# Patient Record
Sex: FEMALE | Race: BLACK OR AFRICAN AMERICAN | NOT HISPANIC OR LATINO | Employment: UNEMPLOYED | URBAN - METROPOLITAN AREA
[De-identification: names, ages, dates, MRNs, and addresses within clinical notes are randomized per-mention and may not be internally consistent; named-entity substitution may affect disease eponyms.]

---

## 2017-03-09 ENCOUNTER — LAB VISIT (OUTPATIENT)
Dept: LAB | Facility: HOSPITAL | Age: 47
End: 2017-03-09
Attending: FAMILY MEDICINE
Payer: COMMERCIAL

## 2017-03-09 ENCOUNTER — OFFICE VISIT (OUTPATIENT)
Dept: FAMILY MEDICINE | Facility: CLINIC | Age: 47
End: 2017-03-09
Payer: COMMERCIAL

## 2017-03-09 VITALS
OXYGEN SATURATION: 98 % | BODY MASS INDEX: 27.1 KG/M2 | TEMPERATURE: 98 F | RESPIRATION RATE: 18 BRPM | SYSTOLIC BLOOD PRESSURE: 114 MMHG | HEIGHT: 65 IN | HEART RATE: 84 BPM | WEIGHT: 162.69 LBS | DIASTOLIC BLOOD PRESSURE: 72 MMHG

## 2017-03-09 DIAGNOSIS — H93.19 TINNITUS, UNSPECIFIED LATERALITY: ICD-10-CM

## 2017-03-09 DIAGNOSIS — R20.2 TINGLING: ICD-10-CM

## 2017-03-09 DIAGNOSIS — D64.9 BORDERLINE ANEMIA: ICD-10-CM

## 2017-03-09 DIAGNOSIS — E55.9 MILD VITAMIN D DEFICIENCY: ICD-10-CM

## 2017-03-09 DIAGNOSIS — F41.0 PANIC ATTACKS: ICD-10-CM

## 2017-03-09 DIAGNOSIS — J30.2 SEASONAL ALLERGIC RHINITIS, UNSPECIFIED ALLERGIC RHINITIS TRIGGER: ICD-10-CM

## 2017-03-09 LAB
25(OH)D3+25(OH)D2 SERPL-MCNC: 24 NG/ML
ALBUMIN SERPL BCP-MCNC: 3.4 G/DL
ALP SERPL-CCNC: 53 U/L
ALT SERPL W/O P-5'-P-CCNC: 7 U/L
ANION GAP SERPL CALC-SCNC: 10 MMOL/L
AST SERPL-CCNC: 17 U/L
BASOPHILS # BLD AUTO: 0.02 K/UL
BASOPHILS NFR BLD: 0.4 %
BILIRUB SERPL-MCNC: 0.5 MG/DL
BUN SERPL-MCNC: 6 MG/DL
CALCIUM SERPL-MCNC: 9.2 MG/DL
CHLORIDE SERPL-SCNC: 102 MMOL/L
CHOLEST/HDLC SERPL: 3.9 {RATIO}
CO2 SERPL-SCNC: 28 MMOL/L
CREAT SERPL-MCNC: 0.8 MG/DL
DIFFERENTIAL METHOD: ABNORMAL
EOSINOPHIL # BLD AUTO: 0.1 K/UL
EOSINOPHIL NFR BLD: 1.9 %
ERYTHROCYTE [DISTWIDTH] IN BLOOD BY AUTOMATED COUNT: 12.3 %
EST. GFR  (AFRICAN AMERICAN): >60 ML/MIN/1.73 M^2
EST. GFR  (NON AFRICAN AMERICAN): >60 ML/MIN/1.73 M^2
FERRITIN SERPL-MCNC: 39 NG/ML
GLUCOSE SERPL-MCNC: 74 MG/DL
HCT VFR BLD AUTO: 36.6 %
HDL/CHOLESTEROL RATIO: 25.4 %
HDLC SERPL-MCNC: 201 MG/DL
HDLC SERPL-MCNC: 51 MG/DL
HGB BLD-MCNC: 12.6 G/DL
LDLC SERPL CALC-MCNC: 137.8 MG/DL
LYMPHOCYTES # BLD AUTO: 1.4 K/UL
LYMPHOCYTES NFR BLD: 31 %
MCH RBC QN AUTO: 31.8 PG
MCHC RBC AUTO-ENTMCNC: 34.4 %
MCV RBC AUTO: 92 FL
MONOCYTES # BLD AUTO: 0.3 K/UL
MONOCYTES NFR BLD: 5.8 %
NEUTROPHILS # BLD AUTO: 2.8 K/UL
NEUTROPHILS NFR BLD: 60.7 %
NONHDLC SERPL-MCNC: 150 MG/DL
PLATELET # BLD AUTO: 243 K/UL
PMV BLD AUTO: 10.2 FL
POTASSIUM SERPL-SCNC: 4.5 MMOL/L
PROT SERPL-MCNC: 7.7 G/DL
RBC # BLD AUTO: 3.96 M/UL
SODIUM SERPL-SCNC: 140 MMOL/L
TRIGL SERPL-MCNC: 61 MG/DL
TSH SERPL DL<=0.005 MIU/L-ACNC: 0.73 UIU/ML
WBC # BLD AUTO: 4.65 K/UL

## 2017-03-09 PROCEDURE — 82306 VITAMIN D 25 HYDROXY: CPT

## 2017-03-09 PROCEDURE — 80053 COMPREHEN METABOLIC PANEL: CPT

## 2017-03-09 PROCEDURE — 1160F RVW MEDS BY RX/DR IN RCRD: CPT | Mod: S$GLB,,, | Performed by: FAMILY MEDICINE

## 2017-03-09 PROCEDURE — 99214 OFFICE O/P EST MOD 30 MIN: CPT | Mod: S$GLB,,, | Performed by: FAMILY MEDICINE

## 2017-03-09 PROCEDURE — 99999 PR PBB SHADOW E&M-EST. PATIENT-LVL III: CPT | Mod: PBBFAC,,, | Performed by: FAMILY MEDICINE

## 2017-03-09 PROCEDURE — 85025 COMPLETE CBC W/AUTO DIFF WBC: CPT

## 2017-03-09 PROCEDURE — 82728 ASSAY OF FERRITIN: CPT

## 2017-03-09 PROCEDURE — 80061 LIPID PANEL: CPT

## 2017-03-09 PROCEDURE — 84443 ASSAY THYROID STIM HORMONE: CPT

## 2017-03-09 PROCEDURE — 36415 COLL VENOUS BLD VENIPUNCTURE: CPT | Mod: PO

## 2017-03-09 RX ORDER — ALPRAZOLAM 1 MG/1
TABLET ORAL
Qty: 30 TABLET | Refills: 1 | Status: SHIPPED | OUTPATIENT
Start: 2017-03-09 | End: 2017-10-05 | Stop reason: SDUPTHER

## 2017-03-09 RX ORDER — FLUTICASONE PROPIONATE 50 MCG
2 SPRAY, SUSPENSION (ML) NASAL DAILY PRN
Qty: 16 G | Refills: 5 | Status: SHIPPED | OUTPATIENT
Start: 2017-03-09 | End: 2017-04-25

## 2017-03-09 NOTE — PROGRESS NOTES
Subjective:       Patient ID: Joycelyn Wesley is a 46 y.o. female.    Chief Complaint: Chest Pain; Tingling; and Tinnitus    HPI Comments: Ms. Wesley comes in today with the following complaints.    She reports having intermittent unusual chest sensation for 2 weeks.  She does not describe chest sensation as pain and denies having tightness, shortness of breath, cough, wheezing.  She states she is scheduled to see Dr. William Wagoner, cardiologist, on March 24, 2017 for further evaluation.  She denies known family history of heart disease.  She denies use of tobacco, alcohol or drugs.    She also reports intermittently having tingling sensation without pain in her legs and arms which recur 2-3 weeks ago.  She reports having similar symptoms in the past (around November 2015).  She denies associated trauma.  She states she might actually be experiencing anxiety with tingling sensation and more so at night.  She states sometimes she takes Xanax with help. She was evaluated by Dr. Terrell, neurologist, on January 22, 2016 for tingling sensation and advised symptoms might be anxiety-related with worst possibility - MS or spinal cord lesion; however, he advised her to make a note of whether her symptoms occur with anxiety at night, and if occurs in normal situation, follow-up with him advised at which time he would order MRI of the brain and cervical spine.    She reports having occasional high-pitched sound and hears wind and both of her ears for at least one month.  She denies having associated sinus symptoms, ear pain, fever, chills, headache.    She reports having chronic cold intolerance.    She is fasting today.      Current Outpatient Prescriptions:  alprazolam (XANAX) 1 MG tablet, TAKE 1 TABLET BY MOUTH DAILY AS NEEDED FOR INSOMNIA OR ANXIETY  vitamin D (VITAMIN D3) 1000 units Tab, Take 185 mg by mouth once daily.        Chest Pain    Pertinent negatives include no abdominal pain, back pain, cough, fever,  nausea, palpitations, shortness of breath or vomiting.   Ringing in Ears:    Associated symptoms: tinnitus.  No ear pain, no fever and no rhinorrhea.    Review of Systems   Constitutional: Negative for chills, fatigue and fever.   HENT: Positive for tinnitus. Negative for congestion, ear pain, rhinorrhea, sinus pressure, sneezing and sore throat.         See history of present illness.   Respiratory: Negative for cough, shortness of breath and wheezing.    Cardiovascular: Negative for chest pain, palpitations and leg swelling.        See history of present illness.   Gastrointestinal: Negative for abdominal pain, constipation, diarrhea, nausea and vomiting.   Endocrine: Positive for cold intolerance. Negative for heat intolerance.   Genitourinary: Negative for difficulty urinating.   Musculoskeletal: Negative for back pain.   Neurological:        Positive for tingling sensation. See history of present illness.   Psychiatric/Behavioral: Negative for dysphoric mood. The patient is nervous/anxious.         See history of present illness.       Objective:      Physical Exam   Constitutional: She is oriented to person, place, and time. She appears well-developed and well-nourished. No distress.   Pleasant.   HENT:   Head: Normocephalic and atraumatic.   Right Ear: External ear normal.   Left Ear: External ear normal.   Mouth/Throat: No oropharyngeal exudate.   Non tender sinuses.  Left TM slightly congested without perforation.  Right TM shiny and clear.  Nasal mucosa pink, slightly congested (left > right) without drainage noted.   Eyes: Conjunctivae and EOM are normal. Pupils are equal, round, and reactive to light. Right eye exhibits no discharge.   Neck: Normal range of motion. Neck supple. No thyromegaly present.   Cardiovascular: Normal rate, regular rhythm and intact distal pulses.    No murmur heard.  Pulmonary/Chest: Effort normal and breath sounds normal. No respiratory distress. She has no wheezes.    Abdominal: Soft. Bowel sounds are normal. She exhibits no distension and no mass. There is no tenderness. There is no rebound and no guarding.   Musculoskeletal: Normal range of motion. She exhibits no edema or tenderness.   She is ambulatory without problems.    Lymphadenopathy:     She has no cervical adenopathy.   Neurological: She is alert and oriented to person, place, and time. She has normal reflexes.   Skin: She is not diaphoretic.   Psychiatric: She has a normal mood and affect. Her behavior is normal. Judgment and thought content normal.   Vitals reviewed.      Assessment:       1. Tingling    2. Tinnitus, unspecified laterality    3. Mild vitamin D deficiency    4. Borderline anemia    5. Seasonal allergic rhinitis, unspecified allergic rhinitis trigger    6. Panic attacks        Plan:       1.  Labs:  TSH, CBC, CMP, Lipid panel, vitamin D, Ferritin.  Patient advised to correspond via My Chart for results.  2.  Continue current medications, follow low sodium, low cholesterol, low carb diet, daily walks.  3.  Audiogram.  4.  Advised patient to keep diary of tingling sensation, and if anxiety not occurs with tingling sensation, patient needs to return to neurology for further evaluation.  5.  Keep follow up with specialists.  6.  Prescription refill - Alprazolam 1 mg daily prn anxiety, insomnia, #30, 1 refills.  7.  Flonase 2 sprays each nostril daily prn, #1, 5 refills.  8.  Follow up sooner if no improvement or worsening symptoms noted.

## 2017-03-09 NOTE — MR AVS SNAPSHOT
[facility]  [address] [address]  Phone: [phone]                  Joycelyn Wesley   3/9/2017 10:30 AM   Office Visit    Description:  Female : 1970   Provider:  Jenn Womack MD   Department:  [facility]           Reason for Visit     Chest Pain     Tingling     Tinnitus           Diagnoses this Visit        Comments    Tingling         Tinnitus, unspecified laterality         Mild vitamin D deficiency         Borderline anemia         Seasonal allergic rhinitis, unspecified allergic rhinitis trigger         Panic attacks                To Do List           Future Appointments        Provider Department Dept Phone    3/9/2017 11:40 AM LABORATORY, [address] [facility] [phone]    3/14/2017 9:30 AM Marisa Castillo [facility] - Audiology [phone]    2017 9:00 AM Jenn Womack MD [facility] [phone]      Goals (5 Years of Data)     None      Follow-Up and Disposition     Return in about 7 weeks (around 2017) for physical.       These Medications        Disp Refills Start End    alprazolam (XANAX) 1 MG tablet 30 tablet 1 3/9/2017     TAKE 1 TABLET BY MOUTH DAILY AS NEEDED FOR INSOMNIA OR ANXIETY    Pharmacy: [facility] [id number] [place] [id number] [facility] [place] [address] [facility] Ph #: [phone]       fluticasone (FLONASE) 50 mcg/actuation nasal spray 16 g 5 3/9/2017     2 sprays by Each Nare route daily as needed. - Each Nare    Pharmacy: [facility] [id number] [place], [place] - [address] AT [facility] Ph #: [phone]         [facility] On [facility]     [facility] Nurse Care Line -  Assistance  Registered nurses in the [facility] provide clinical advisement, health education, appointment booking, and other advisory services.  Call for this free service at [phone].            "  Medications           Message regarding Medications     Verify the changes and/or additions to your medication regime listed below are the same as discussed with your clinician today.  If any of these changes or additions are incorrect, please notify your healthcare provider.        START taking these NEW medications        Refills    fluticasone (FLONASE) 50 mcg/actuation nasal spray 5    Si sprays by Each Nare route daily as needed.    Class: Normal    Route: Each Nare           Verify that the below list of medications is an accurate representation of the medications you are currently taking.  If none reported, the list may be blank. If incorrect, please contact your healthcare provider. Carry this list with you in case of emergency.           Current Medications     alprazolam (XANAX) 1 MG tablet TAKE 1 TABLET BY MOUTH DAILY AS NEEDED FOR INSOMNIA OR ANXIETY    vitamin D (VITAMIN D3) 1000 units Tab Take 185 mg by mouth once daily.    fluticasone (FLONASE) 50 mcg/actuation nasal spray 2 sprays by Each Nare route daily as needed.           Clinical Reference Information           Your Vitals Were     BP Pulse Temp Resp Height    114/72 (BP Location: Left arm, Patient Position: Sitting, BP Method: Manual) 84 97.5 °F (36.4 °C) (Tympanic) 18 5' 5" (1.651 m)    Weight Last Period SpO2 BMI    73.8 kg (162 lb 11.2 oz) 2017 (Exact Date) 98% 27.07 kg/m2      Blood Pressure          Most Recent Value    BP  114/72      Allergies as of 3/9/2017     No Known Allergies      Immunizations Administered on Date of Encounter - 3/9/2017     None      Orders Placed During Today's Visit     Future Labs/Procedures Expected by Expires    CBC auto differential  3/9/2017 2018    Comprehensive metabolic panel  3/9/2017 2018    Ferritin  3/9/2017 2018    Lipid panel  3/9/2017 2018    TSH  3/9/2017 2018    Vitamin D  3/9/2017 2018    Comprehensive audiogram  As directed 3/9/2018      Instructions  "   Please correspond via My Chart for your results.    Thanks.       Language Assistance Services     ATTENTION: Language assistance services are available, free of charge. Please call [phone].      ATENCIÓN: Si gilberto clark, tiene a rudd disposición servicios gratuitos de asistencia lingüística. Llame al [phone].     CHÚ Ý: N?u b?n nói Ti?ng Vi?t, có các d?ch v? h? tr? ngôn ng? mi?n phí dành cho b?n. G?i s? [phone].         [facility] complies with applicable Federal civil rights laws and does not discriminate on the basis of race, color, national origin, age, disability, or sex.

## 2017-03-14 ENCOUNTER — CLINICAL SUPPORT (OUTPATIENT)
Dept: AUDIOLOGY | Facility: CLINIC | Age: 47
End: 2017-03-14
Payer: COMMERCIAL

## 2017-03-14 DIAGNOSIS — H93.19 TINNITUS, UNSPECIFIED LATERALITY: ICD-10-CM

## 2017-03-14 DIAGNOSIS — H93.13 TINNITUS, BILATERAL: Primary | ICD-10-CM

## 2017-03-14 PROCEDURE — 92557 COMPREHENSIVE HEARING TEST: CPT | Mod: S$GLB,,, | Performed by: AUDIOLOGIST

## 2017-03-14 PROCEDURE — 92567 TYMPANOMETRY: CPT | Mod: S$GLB,,, | Performed by: AUDIOLOGIST

## 2017-03-14 NOTE — PROGRESS NOTES
Joycelyn Wesley was seen 03/14/2017 for an audiological evaluation.  Patient complains of occasional mild bilateral tinnitus that alternates between right and left ears.  She denies hearing loss, dizziness, headaches, family history of hearing loss, noise exposure, and ear surgeries.    Results reveal normal hearing sensitivity 250-8000 Hz bilaterally.  Speech Reception Thresholds were  5 dBHL for the right ear and 5 dBHL for the left ear.   Word recognition scores were excellent bilaterally.  Tympanograms were Type A for the right ear and Type A for the left ear.    Patient was counseled on the above findings.  Discussed homeopathic treatments of tinnitus. Pt expressed understanding.    REC:  PCP review of audiogram  Return to clinic if concern arises

## 2017-04-25 ENCOUNTER — OFFICE VISIT (OUTPATIENT)
Dept: FAMILY MEDICINE | Facility: CLINIC | Age: 47
End: 2017-04-25
Payer: COMMERCIAL

## 2017-04-25 VITALS
SYSTOLIC BLOOD PRESSURE: 124 MMHG | HEIGHT: 65 IN | BODY MASS INDEX: 26.85 KG/M2 | TEMPERATURE: 98 F | HEART RATE: 74 BPM | RESPIRATION RATE: 18 BRPM | WEIGHT: 161.19 LBS | DIASTOLIC BLOOD PRESSURE: 72 MMHG

## 2017-04-25 DIAGNOSIS — E55.9 MILD VITAMIN D DEFICIENCY: ICD-10-CM

## 2017-04-25 DIAGNOSIS — Z12.31 OTHER SCREENING MAMMOGRAM: ICD-10-CM

## 2017-04-25 DIAGNOSIS — F41.0 PANIC ATTACKS: ICD-10-CM

## 2017-04-25 DIAGNOSIS — F51.04 CHRONIC INSOMNIA: ICD-10-CM

## 2017-04-25 DIAGNOSIS — Z80.3 FAMILY HISTORY OF BREAST CANCER IN FIRST DEGREE RELATIVE: ICD-10-CM

## 2017-04-25 DIAGNOSIS — Z00.00 ANNUAL PHYSICAL EXAM: Primary | ICD-10-CM

## 2017-04-25 PROCEDURE — 99396 PREV VISIT EST AGE 40-64: CPT | Mod: S$GLB,,, | Performed by: FAMILY MEDICINE

## 2017-04-25 PROCEDURE — 99999 PR PBB SHADOW E&M-EST. PATIENT-LVL IV: CPT | Mod: PBBFAC,,, | Performed by: FAMILY MEDICINE

## 2017-04-25 NOTE — PROGRESS NOTES
HISTORY OF PRESENT ILLNESS: Ms. Wesley comes in today fasting for annual wellness examination. She reports no acute problems today.     END OF LIFE DECISION: She does not have a living will and is not sure about life support.     Current Outpatient Prescriptions   Medication Sig    alprazolam (XANAX) 1 MG tablet TAKE 1 TABLET BY MOUTH DAILY AS NEEDED FOR INSOMNIA OR ANXIETY    vitamin D (VITAMIN D3) 1000 units Tab Take 1000 units by mouth once daily.       SCREENINGS:   Cholesterol: March 9, 2017.  FFS/Colonoscopy: November 9, 2015 - okay; repeat in 10 years.   Mammogram: May 27, 2016 - okay.   GYN Exam: April 26, 2016 pap smear - okay. Reports no history of abnormal pap smear. Pap smears 2012 - 2013, 2016 okay per patient.  Dexa Scan: Never.  Eye Exam: December 16, 2014 per patient. She wears reading glasses now.  PPD: Never.  Immunizations: Td/Tdap - April 20, 2015.  Gardasil - N./A.  Zostavax - N./A.  Pneumovax - never.  Seasonal Flu - November 13, 2015.    ROS:  GENERAL: Denies fever, chills, fatigue or unusual weight change. Appetite normal. Weight 73.8 kg (162 lb 11.2 oz) at March 9, 2017 visit.  Walks 4 times per week and monitors diet most days.   SKIN: Denies rashes, itching, changes in mole, color or texture of skin or easy bruising.  HEAD: Denies recent head trauma or headaches.  EYES: Denies change in vision, pain, diplopia, redness or discharge. She now wears readers.  EARS: Denies ear pain, discharge, vertigo or decreased hearing.  NOSE: Denies loss of smell, epistaxis or rhinitis.  MOUTH & THROAT: Denies hoarseness or change in voice. Denies excessive gum bleeding or mouth sores. Denies sore throat.  NODES: Denies swollen glands.  CHEST: Denies RUBALCAVA, wheezing, cough, or sputum production.  CARDIOVASCULAR: Denies chest pain, PND, orthopnea or reduced exercise tolerance. Denies palpitations.  ABDOMEN: Denies diarrhea, constipation, nausea, vomiting, abdominal pain, or blood in stool.  URINARY: Denies  "flank pain, dysuria or hematuria.  GENITOURINARY: Denies flank pain, dysuria, frequency or hematuria. No change in menses. Performs monthly breast self exams. Desires to have BRCA testing as strong family history of breast cancer.  ENDOCRINE: Denies diabetes, thyroid, cholesterol problems.  HEME/LYMPH: Denies bleeding problems.   PERIPHERAL VASCULAR:Denies claudication or cyanosis.  MUSCULOSKELETAL: Denies joint stiffness, pain or swelling. Denies edema.  NEUROLOGIC: Denies history of seizures, tremors, paralysis, alteration of gait or coordination.  PSYCHIATRIC: Denies mood swings, depression, anxiety, homicidal or suicidal thoughts. Reports sleeps better with taking Alprazolam.     PE:   VS: /72  Pulse 74  Temp 97.6 °F (36.4 °C) (Tympanic)   Resp 18  Ht 5' 5" (1.651 m)  Wt 73.1 kg (161 lb 2.5 oz)  LMP 04/21/2017 Comment: Monthly  BMI 26.82 kg/m2  APPEARANCE: Well nourished, well developed female, pleasant and overweight, alert and oriented in no acute distress.   HEAD: Nontender. Full range of motion.  EYES: PERRL, conjunctiva pink, lids no edema.  EARS: External canal patent, no swelling or redness. TM's shiny and clear.  NOSE: Mucosa and turbinates pink, not swollen. No discharge. Nontender sinuses.  THROAT: No pharyngeal erythema or exudate. No stridor.   NECK: Supple, no mass, thyroid not enlarged.  NODES: No cervical, axillary or inguinal lymph node enlargement.  CHEST: Normal respiratory effort. Lungs clear to auscultation.  CARDIOVASCULAR: Normal S1, S2. No rubs, murmurs or gallops. PMI not displaced. No carotid bruit. Pedal pulses palpable bilaterally. No edema.  ABDOMEN: Bowel sounds present. Not distended. Soft. No tenderness, masses or organomegaly.  BREAST EXAM: Symmetrical, no external lesions, no discharge, no masses palpated.  PELVIC EXAM: No external lesions noted, no discharge, cervix appears normal, bimanual exam showed no uterine abnormalities and no adnexal masses. Urethra and " bladder intact.  RECTAL EXAM: Not performed.   MUSCULOSKELETAL: No joint deformities or stiffness. She is ambulatory without problems.  SKIN: No rashes or suspicious lesions, normal color and turgor. Benign-appearing black mole at left face near mouth (chronic, unchanged and previously seen by dermatologist per patient) and non tender skin tag at left inner thigh (chronic per patient).  NEUROLOGIC: Cranial Nerves: II-XII grossly intact. DTR's: Knees, Ankles 2+ and equal bilaterally. Gait & Posture: Normal gait and fine motion.  PSYCHIATRIC: Patient alert, oriented x 3. Mood/Affect normal without acute anxiety and depression noted. Judgment/insight good as she makes appropriate decisions on today's examination.    Lab Results   Component Value Date    WBC 4.65 03/09/2017    HGB 12.6 03/09/2017    HCT 36.6 (L) 03/09/2017    MCV 92 03/09/2017     03/09/2017     Vit D, 25-Hydroxy 30 - 96 ng/mL 24 (L)   03/09/2017     Lab Results   Component Value Date    CREATININE 0.8 03/09/2017    BUN 6 03/09/2017     03/09/2017    K 4.5 03/09/2017     03/09/2017    CO2 28 03/09/2017     Glucose 70 - 110 mg/dL 74       03/09/2017     Lab Results   Component Value Date    ALT 7 (L) 03/09/2017    AST 17 03/09/2017    ALKPHOS 53 (L) 03/09/2017    BILITOT 0.5 03/09/2017     Lab Results   Component Value Date    TSH 0.726 03/09/2017     Lab Results   Component Value Date    FERRITIN 39 03/09/2017     Lab Results   Component Value Date    LDLCALC 137.8 03/09/2017       ASSESSMENT:    ICD-10-CM ICD-9-CM    1. Annual physical exam Z00.00 V70.0    2. Mild vitamin D deficiency E55.9 268.9    3. Panic attacks F41.0 300.01    4. Chronic insomnia F51.04 780.52    5. Family history of breast cancer in first degree relative Z80.3 V16.3 Ambulatory referral to Hematology / Oncology   6. Other screening mammogram Z12.31 V76.12 Mammo Digital Screening Bilat with CAD     PLAN:  1. Age-appropriate counseling-appropriate low-sodium,  low-cholesterol diet and exercise daily, monthly breast self exam, annual wellness examination.  2. Continue current medications.  3. Increase vitamin D 1000 units to 2000 units daily.  4. Follow up prn.

## 2017-04-25 NOTE — MR AVS SNAPSHOT
[facility]  [address] [place] [address]  Phone: [phone]                  Joycelyn Wesley   2017 9:00 AM   Office Visit    Description:  Female : 1970   Provider:  Jenn Womack MD   Department:  [facility]           Reason for Visit     Annual Exam           Diagnoses this Visit        Comments    Annual physical exam    -  Primary     Mild vitamin D deficiency         Panic attacks         Chronic insomnia         Family history of breast cancer in first degree relative         Other screening mammogram                To Do List           Future Appointments        Provider Department Dept Phone    2017 8:20 AM Soledad Valera MD [facility] - Hemotology Oncology [phone]    2017 9:30 AM [facility] [id number] [facility] [phone]      Goals (5 Years of Data)     None      [facility] On Call     [facility] On Call Nurse Care Line -  Assistance  Unless otherwise directed by your provider, please contact [facility], our nurse care line that is available for  assistance.     Registered nurses in the [facility] provide: appointment scheduling, clinical advisement, health education, and other advisory services.  Call: [phone] (toll free)               Medications           Message regarding Medications     Verify the changes and/or additions to your medication regime listed below are the same as discussed with your clinician today.  If any of these changes or additions are incorrect, please notify your healthcare provider.        STOP taking these medications     fluticasone (FLONASE) 50 mcg/actuation nasal spray 2 sprays by Each Nare route daily as needed.           Verify that the below list of medications is an accurate representation of the medications you are currently taking.  If none reported, the list may be blank. If incorrect, please contact your healthcare provider.  "Carry this list with you in case of emergency.           Current Medications     alprazolam (XANAX) 1 MG tablet TAKE 1 TABLET BY MOUTH DAILY AS NEEDED FOR INSOMNIA OR ANXIETY    vitamin D (VITAMIN D3) 1000 units Tab Take 185 mg by mouth once daily.           Clinical Reference Information           Your Vitals Were     BP Pulse Temp Resp Height Weight    124/72 74 97.6 °F (36.4 °C) (Tympanic) 18 5' 5" (1.651 m) 73.1 kg (161 lb 2.5 oz)    Last Period BMI             04/21/2017 26.82 kg/m2         Blood Pressure          Most Recent Value    BP  124/72      Allergies as of 4/25/2017     No Known Allergies      Immunizations Administered on Date of Encounter - 4/25/2017     None      Orders Placed During Today's Visit      Normal Orders This Visit    Ambulatory referral to Hematology / Oncology     Future Labs/Procedures Expected by Expires    Mammo Digital Screening Bilat with CAD  4/25/2017 6/25/2018      Instructions    Please correspond with Dr. Womack via My Chart for your results.     Thanks.       Language Assistance Services     ATTENTION: Language assistance services are available, free of charge. Please call [phone].      ATENCIÓN: Si gilberto clark, tiene a rudd disposición servicios gratuitos de asistencia lingüística. Llame al [phone].     AMELIA Ý: N?u b?n nói Ti?ng Vi?t, có các d?ch v? h? tr? ngôn ng? mi?n phí dành cho b?n. G?i s? [phone].         [facility] complies with applicable Federal civil rights laws and does not discriminate on the basis of race, color, national origin, age, disability, or sex.        "

## 2017-04-27 ENCOUNTER — INITIAL CONSULT (OUTPATIENT)
Dept: HEMATOLOGY/ONCOLOGY | Facility: CLINIC | Age: 47
End: 2017-04-27
Payer: COMMERCIAL

## 2017-04-27 ENCOUNTER — INFUSION (OUTPATIENT)
Dept: INFUSION THERAPY | Facility: HOSPITAL | Age: 47
End: 2017-04-27
Attending: INTERNAL MEDICINE
Payer: COMMERCIAL

## 2017-04-27 VITALS
TEMPERATURE: 99 F | RESPIRATION RATE: 18 BRPM | WEIGHT: 160.25 LBS | HEART RATE: 74 BPM | HEIGHT: 65 IN | DIASTOLIC BLOOD PRESSURE: 80 MMHG | SYSTOLIC BLOOD PRESSURE: 120 MMHG | BODY MASS INDEX: 26.7 KG/M2 | OXYGEN SATURATION: 98 %

## 2017-04-27 DIAGNOSIS — Z13.79 GENETIC TESTING: ICD-10-CM

## 2017-04-27 DIAGNOSIS — Z80.3 FAMILY HISTORY OF BREAST CANCER IN FIRST DEGREE RELATIVE: Primary | ICD-10-CM

## 2017-04-27 PROCEDURE — 99999 PR PBB SHADOW E&M-EST. PATIENT-LVL III: CPT | Mod: PBBFAC,,, | Performed by: INTERNAL MEDICINE

## 2017-04-27 PROCEDURE — 36415 COLL VENOUS BLD VENIPUNCTURE: CPT | Mod: PO

## 2017-04-27 PROCEDURE — 99244 OFF/OP CNSLTJ NEW/EST MOD 40: CPT | Mod: S$GLB,,, | Performed by: INTERNAL MEDICINE

## 2017-04-27 NOTE — LETTER
April 27, 2017      Jenn Womack MD  [address] [place] [address]           [facility] Hemotology Oncology  [address]  [place] [address]  Phone: [phone]  Fax: [phone]          Patient: Joycelyn Wesley   MR Number: [id number]   YOB: 1970   Date of Visit: 4/27/2017       Dear Dr. Jenn Womack:    Thank you for referring Joycelyn Wesley to me for evaluation. Attached you will find relevant portions of my assessment and plan of care.    If you have questions, please do not hesitate to call me. I look forward to following Joycelyn Wesley along with you.    Sincerely,    Soledda Valera MD    Enclosure  CC:  No Recipients    If you would like to receive this communication electronically, please contact [email or web address] or [phone] to request more information on [employer] Link access.    For providers and/or their staff who would like to refer a patient to [facility], please contact us through our one-stop-shop provider referral line, [facility], at [phone].    If you feel you have received this communication in error or would no longer like to receive these types of communications, please e-mail [email or web address]

## 2017-04-27 NOTE — NURSING
BRCA testing drawn per myself via 1 venipuncture from left ac. BTRA 139. Pt. Tolerated well. Pt. Will f/u with Dr. Valera as scheduled.

## 2017-04-27 NOTE — PATIENT INSTRUCTIONS
[facility]  [address]  [address] [address]  [phone] phone     [phone] fax  Hours of Operation: Monday- Friday 8:00am- 5:00pm  After hours phone  [phone]  Hematology / Oncology Physicians on call      Dr. Bryan Valera    Please call with any concerns regarding your appointment today.

## 2017-04-27 NOTE — PROGRESS NOTES
Hematology/Oncology Consult Note    Reason for Consult: Family h/o breast cancer    Consult requested by: Dr. Jenn Womack, Internal Medicine    History of Present Illness:  Ms. Wesley is a 47 y/o female who comes in to request genetic testing given her family history of breast cancer. She states her mother  of metastatic breast cancer without any treatment at age 56. Two of her aunts also have breast cancer. She does perform self breast exams and underwent mammogram in 2016. The patient's sister reportedly underwent BRCA testing, which was negative. The patient denies any breast lumps at this time.    Answers for HPI/ROS submitted by the patient on 2017   appetite change : No  unexpected weight change: No  visual disturbance: No  cough: No  shortness of breath: No  chest pain: No  abdominal pain: No  diarrhea: No  frequency: No  back pain: No  rash: No  headaches: No  adenopathy: No  nervous/ anxious: Yes    Past Medical History:   Diagnosis Date    Mild vitamin D deficiency 2015    Panic attacks      Social History:  Social History     Social History    Marital status:      Spouse name: N/A    Number of children: 3    Years of education: N/A     Occupational History    Homemaker      Social History Main Topics    Smoking status: Never Smoker    Smokeless tobacco: Never Used    Alcohol use No    Drug use: No    Sexual activity: Yes     Partners: Male     Birth control/ protection: Surgical     Other Topics Concern    Not on file     Social History Narrative    She wears seatbelt.       Family History: family history includes Cancer in her maternal aunt, maternal aunt, maternal grandmother, and mother; Emphysema in her father; No Known Problems in her daughter, sister, son, and son; Panic disorder in her brother. There is no history of Heart disease, Diabetes, Stroke, or Hypertension. Mother  of breast cancer age 56. 2 maternal aunts have breast cancer, diagnosed in their  50s. Maternal grandmother  of colon cancer.     Physical Exam:  Vitals:    17 0849   BP: 120/80   Pulse: 74   Resp: 18   Temp: 99 °F (37.2 °C)     Body mass index is 26.67 kg/(m^2).  General:  AAOx4, no acute distress  HEENT: EOMI. Normocephalic and atraumatic. No maxillary sinus tenderness. External auditory canals clear and TMs intact without lesions. Nasal and oral mucosal membranes moist. Normal dentition and gums.   Neck: no LAD, thyromegaly, normal ROM  Pulmonary: Bilaterally clear to auscultation, Normal effort with no accessory muscle use, no wheezes/rales/rhonchi  CV: Normal rate, regular rhythm, no murmurs/rubs/gallops, no edema  ABD:  Soft, nontender, nondistended, no mass, and without hepatosplenomegaly   Ext: No clubbing, cyanosis, or edema, normal ROM  Skin: No rashes, lesions, bruising or petechiae  Neurological: No focal deficits, CN II to XII grossly intact, normal coordination    Psychiatric:  Normal mood, affect and judgement  Hem/Lymph:  No submandibular, cervical, supraclavicular, axillary LAD.      Labs:    Lab Results   Component Value Date    WBC 4.65 2017    HGB 12.6 2017    HCT 36.6 (L) 2017    MCV 92 2017     2017     BMP  Lab Results   Component Value Date     2017    K 4.5 2017     2017    CO2 28 2017    BUN 6 2017    CREATININE 0.8 2017    CALCIUM 9.2 2017    ANIONGAP 10 2017    ESTGFRAFRICA >60.0 2017    EGFRNONAA >60.0 2017     Lab Results   Component Value Date    ALT 7 (L) 2017    AST 17 2017    ALKPHOS 53 (L) 2017    BILITOT 0.5 2017       Lab Results   Component Value Date    FERRITIN 39 2017     Lab Results   Component Value Date    VISOXBWU63 1911 (H) 2015     Lab Results   Component Value Date    FOLATE 17.1 2015     Lab Results   Component Value Date    TSH 0.726 2017       Imaging:  Mammogram 2016: Benign  negative. Repeat in 1 yr.    Assessment / Plan:  Joycelyn Wesley is a 46 y.o. female who comes in with family history of breast cancer.    1. Family history of breast cancer: Breast cancer in mother and 2 maternal aunts.   -- Genetic testing ([facility]) collected today  -- Return in 4 weeks to review results.    2. Health maintenance: Agree with annual mammograms as well as monthly self breast exam and annual clinical breast exam. Also discussed maintaining a health weight and getting regular exercise, which patient is doing.    Soledad Valera M.D.  Hematology Oncology

## 2017-05-03 ENCOUNTER — INFUSION (OUTPATIENT)
Dept: INFUSION THERAPY | Facility: HOSPITAL | Age: 47
End: 2017-05-03
Attending: INTERNAL MEDICINE
Payer: COMMERCIAL

## 2017-05-03 ENCOUNTER — TELEPHONE (OUTPATIENT)
Dept: HEMATOLOGY/ONCOLOGY | Facility: CLINIC | Age: 47
End: 2017-05-03

## 2017-05-03 DIAGNOSIS — Z80.3 FAMILY HISTORY OF BREAST CANCER: Primary | ICD-10-CM

## 2017-05-03 PROCEDURE — 36415 COLL VENOUS BLD VENIPUNCTURE: CPT | Mod: PO

## 2017-05-03 NOTE — NURSING
Labs drawn for [facility] genetic testing;    [facility] confirmation #:[id number]

## 2017-05-03 NOTE — TELEPHONE ENCOUNTER
"Informed patient that the sample submitted to [facility] arrived "damaged". Patient is coming in to resubmit a new sample. Chetanna voiced concerned about paying for two test. Informed them that the first test was not "ran" so they will not be billed twice. Only one claim will be sent to the insurance, since one test will be ran. Patient's  verbalized understanding of this information.  "

## 2017-05-03 NOTE — TELEPHONE ENCOUNTER
----- Message from Andrew Lamas sent at 5/3/2017  7:35 AM CDT -----  Contact: pt  She's calling in regards to a missed call, please advise, [phone] (home)

## 2017-06-06 ENCOUNTER — HOSPITAL ENCOUNTER (OUTPATIENT)
Dept: RADIOLOGY | Facility: HOSPITAL | Age: 47
Discharge: HOME | End: 2017-06-06
Attending: FAMILY MEDICINE
Payer: COMMERCIAL

## 2017-06-06 DIAGNOSIS — Z12.31 OTHER SCREENING MAMMOGRAM: ICD-10-CM

## 2017-06-06 PROCEDURE — 77067 SCR MAMMO BI INCL CAD: CPT | Mod: TC

## 2017-06-06 PROCEDURE — 77063 BREAST TOMOSYNTHESIS BI: CPT | Mod: 26,,, | Performed by: RADIOLOGY

## 2017-06-06 PROCEDURE — 77067 SCR MAMMO BI INCL CAD: CPT | Mod: 26,,, | Performed by: RADIOLOGY

## 2017-06-26 ENCOUNTER — DOCUMENTATION ONLY (OUTPATIENT)
Dept: HEMATOLOGY/ONCOLOGY | Facility: CLINIC | Age: 47
End: 2017-06-26

## 2017-06-26 NOTE — PROGRESS NOTES
Received results from [facility] genetic testing: Negative for any clinically significant gene mutations. Have contacted patient regarding results and put results in mail for patient.

## 2017-10-06 RX ORDER — ALPRAZOLAM 1 MG/1
TABLET ORAL
Qty: 30 TABLET | Refills: 0 | Status: SHIPPED | OUTPATIENT
Start: 2017-10-06 | End: 2017-12-22 | Stop reason: SDUPTHER

## 2017-12-23 RX ORDER — ALPRAZOLAM 1 MG/1
TABLET ORAL
Qty: 30 TABLET | Refills: 0 | Status: SHIPPED | OUTPATIENT
Start: 2017-12-23 | End: 2018-03-05 | Stop reason: SDUPTHER

## 2018-03-05 RX ORDER — ALPRAZOLAM 1 MG/1
TABLET ORAL
Qty: 30 TABLET | Refills: 0 | Status: SHIPPED | OUTPATIENT
Start: 2018-03-05 | End: 2018-05-16 | Stop reason: SDUPTHER

## 2018-03-15 ENCOUNTER — LAB VISIT (OUTPATIENT)
Dept: LAB | Facility: HOSPITAL | Age: 48
End: 2018-03-15
Attending: FAMILY MEDICINE
Payer: COMMERCIAL

## 2018-03-15 ENCOUNTER — OFFICE VISIT (OUTPATIENT)
Dept: FAMILY MEDICINE | Facility: CLINIC | Age: 48
End: 2018-03-15
Payer: COMMERCIAL

## 2018-03-15 VITALS
BODY MASS INDEX: 25.83 KG/M2 | HEIGHT: 65 IN | OXYGEN SATURATION: 97 % | RESPIRATION RATE: 18 BRPM | DIASTOLIC BLOOD PRESSURE: 80 MMHG | WEIGHT: 155 LBS | TEMPERATURE: 99 F | HEART RATE: 93 BPM | SYSTOLIC BLOOD PRESSURE: 122 MMHG

## 2018-03-15 DIAGNOSIS — Z00.00 ANNUAL PHYSICAL EXAM: ICD-10-CM

## 2018-03-15 DIAGNOSIS — Z12.31 VISIT FOR SCREENING MAMMOGRAM: ICD-10-CM

## 2018-03-15 DIAGNOSIS — E55.9 MILD VITAMIN D DEFICIENCY: ICD-10-CM

## 2018-03-15 LAB
25(OH)D3+25(OH)D2 SERPL-MCNC: 33 NG/ML
ALBUMIN SERPL BCP-MCNC: 3.5 G/DL
ALP SERPL-CCNC: 44 U/L
ALT SERPL W/O P-5'-P-CCNC: 8 U/L
ANION GAP SERPL CALC-SCNC: 7 MMOL/L
AST SERPL-CCNC: 15 U/L
BASOPHILS # BLD AUTO: 0.02 K/UL
BASOPHILS NFR BLD: 0.5 %
BILIRUB SERPL-MCNC: 0.3 MG/DL
BILIRUB UR QL STRIP: NEGATIVE
BUN SERPL-MCNC: 13 MG/DL
CALCIUM SERPL-MCNC: 9.4 MG/DL
CHLORIDE SERPL-SCNC: 104 MMOL/L
CHOLEST SERPL-MCNC: 202 MG/DL
CHOLEST/HDLC SERPL: 3.5 {RATIO}
CLARITY UR REFRACT.AUTO: ABNORMAL
CO2 SERPL-SCNC: 28 MMOL/L
COLOR UR AUTO: YELLOW
CREAT SERPL-MCNC: 0.8 MG/DL
DIFFERENTIAL METHOD: ABNORMAL
EOSINOPHIL # BLD AUTO: 0.1 K/UL
EOSINOPHIL NFR BLD: 2.3 %
ERYTHROCYTE [DISTWIDTH] IN BLOOD BY AUTOMATED COUNT: 11.7 %
EST. GFR  (AFRICAN AMERICAN): >60 ML/MIN/1.73 M^2
EST. GFR  (NON AFRICAN AMERICAN): >60 ML/MIN/1.73 M^2
FERRITIN SERPL-MCNC: 19 NG/ML
GLUCOSE SERPL-MCNC: 75 MG/DL
GLUCOSE UR QL STRIP: NEGATIVE
HCT VFR BLD AUTO: 34.2 %
HDLC SERPL-MCNC: 58 MG/DL
HDLC SERPL: 28.7 %
HGB BLD-MCNC: 11.3 G/DL
HGB UR QL STRIP: ABNORMAL
IMM GRANULOCYTES # BLD AUTO: 0.01 K/UL
IMM GRANULOCYTES NFR BLD AUTO: 0.2 %
KETONES UR QL STRIP: NEGATIVE
LDLC SERPL CALC-MCNC: 134.8 MG/DL
LEUKOCYTE ESTERASE UR QL STRIP: NEGATIVE
LYMPHOCYTES # BLD AUTO: 1.3 K/UL
LYMPHOCYTES NFR BLD: 30 %
MCH RBC QN AUTO: 31.8 PG
MCHC RBC AUTO-ENTMCNC: 33 G/DL
MCV RBC AUTO: 96 FL
MICROSCOPIC COMMENT: ABNORMAL
MONOCYTES # BLD AUTO: 0.2 K/UL
MONOCYTES NFR BLD: 5.2 %
NEUTROPHILS # BLD AUTO: 2.6 K/UL
NEUTROPHILS NFR BLD: 61.8 %
NITRITE UR QL STRIP: NEGATIVE
NONHDLC SERPL-MCNC: 144 MG/DL
NRBC BLD-RTO: 0 /100 WBC
PH UR STRIP: 6 [PH] (ref 5–8)
PLATELET # BLD AUTO: 196 K/UL
PMV BLD AUTO: 10.4 FL
POTASSIUM SERPL-SCNC: 4 MMOL/L
PROT SERPL-MCNC: 7.5 G/DL
PROT UR QL STRIP: NEGATIVE
RBC # BLD AUTO: 3.55 M/UL
RBC #/AREA URNS AUTO: 14 /HPF (ref 0–4)
SODIUM SERPL-SCNC: 139 MMOL/L
SP GR UR STRIP: 1.02 (ref 1–1.03)
SQUAMOUS #/AREA URNS AUTO: 5 /HPF
TRIGL SERPL-MCNC: 46 MG/DL
TSH SERPL DL<=0.005 MIU/L-ACNC: 0.78 UIU/ML
URN SPEC COLLECT METH UR: ABNORMAL
UROBILINOGEN UR STRIP-ACNC: NEGATIVE EU/DL
WBC # BLD AUTO: 4.27 K/UL

## 2018-03-15 PROCEDURE — 82306 VITAMIN D 25 HYDROXY: CPT

## 2018-03-15 PROCEDURE — 84443 ASSAY THYROID STIM HORMONE: CPT

## 2018-03-15 PROCEDURE — 80053 COMPREHEN METABOLIC PANEL: CPT

## 2018-03-15 PROCEDURE — 82728 ASSAY OF FERRITIN: CPT

## 2018-03-15 PROCEDURE — 80061 LIPID PANEL: CPT

## 2018-03-15 PROCEDURE — 99396 PREV VISIT EST AGE 40-64: CPT | Mod: S$GLB,,, | Performed by: FAMILY MEDICINE

## 2018-03-15 PROCEDURE — 85025 COMPLETE CBC W/AUTO DIFF WBC: CPT

## 2018-03-15 PROCEDURE — 99999 PR PBB SHADOW E&M-EST. PATIENT-LVL III: CPT | Mod: PBBFAC,,, | Performed by: FAMILY MEDICINE

## 2018-03-15 PROCEDURE — 81001 URINALYSIS AUTO W/SCOPE: CPT

## 2018-03-15 PROCEDURE — 36415 COLL VENOUS BLD VENIPUNCTURE: CPT | Mod: PO

## 2018-03-15 NOTE — PROGRESS NOTES
HISTORY OF PRESENT ILLNESS: Ms. Wesley comes in today fasting for annual wellness examination. She reports no acute problems today.     END OF LIFE DECISION: She does not have a living will and is not sure about life support.     Current Outpatient Prescriptions   Medication Sig    ALPRAZolam (XANAX) 1 MG tablet TAKE 1 TABLET BY MOUTH ONCE DAILY AS NEEDED FOR INSOMNIA OR ANXIETY.    vitamin D (VITAMIN D3) 1000 units Tab Take 185 mg by mouth once daily.     SCREENINGS:   Cholesterol: March 9, 2017.  FFS/Colonoscopy: November 9, 2015 - okay; repeat in 10 years.   Mammogram: June 6, 2017 - okay.   GYN Exam: April 25, 2017 - okay. April 22, 2016 pap smear - okay. Pap smear due 2019.  Dexa Scan: Never.  Eye Exam: December 16, 2014 per patient. She wears reading glasses.  PPD: Never.  Immunizations: Td/Tdap - April 20, 2015.  Gardasil - N./A.  Zostavax - N./A.  Pneumovax - never.  Seasonal Flu - November 13, 2015. She declines.    ROS:  GENERAL: Denies fever, chills, fatigue or unusual weight change. Appetite normal. Weight 73.1 kg (161 lb 2.5 oz) at April 25, 2017 visit. No recent exercise but monitors diet most days.   SKIN: Denies rashes, itching, changes in mole, color or texture of skin or easy bruising.  HEAD: Denies recent head trauma or headaches.  EYES: Denies change in vision, pain, diplopia, redness or discharge. She wears readers.  EARS: Denies ear pain, discharge, vertigo or decreased hearing.  NOSE: Denies loss of smell, epistaxis or rhinitis.  MOUTH & THROAT: Denies hoarseness or change in voice. Denies excessive gum bleeding or mouth sores. Denies sore throat.  NODES: Denies swollen glands.  CHEST: Denies RUBALCAVA, wheezing, cough, or sputum production.  CARDIOVASCULAR: Denies chest pain, PND, orthopnea or reduced exercise tolerance. Denies palpitations.  ABDOMEN: Denies diarrhea, constipation, nausea, vomiting, abdominal pain, or blood in stool.  URINARY: Denies flank pain, dysuria or  "hematuria.  GENITOURINARY: Denies flank pain, dysuria, frequency or hematuria. No change in menses. Performs monthly breast self exams.   ENDOCRINE: Denies diabetes, thyroid, cholesterol problems.  HEME/LYMPH: Denies bleeding problems.   PERIPHERAL VASCULAR:Denies claudication or cyanosis.  MUSCULOSKELETAL: Denies joint stiffness, pain or swelling. Denies edema.  NEUROLOGIC: Denies history of seizures, tremors, paralysis, alteration of gait or coordination.  PSYCHIATRIC: Denies mood swings, depression, anxiety, homicidal or suicidal thoughts. Reports sleeps better with taking Alprazolam but states only takes it some times.     PE:   VS: /80 (BP Location: Left arm, Patient Position: Sitting, BP Method: Medium (Manual))   Pulse 93   Temp 98.5 °F (36.9 °C) (Oral)   Resp 18   Ht 5' 4.5" (1.638 m)   Wt 70.3 kg (154 lb 15.7 oz)   LMP 03/03/2018 (Exact Date) Comment: Monthly  SpO2 97%   BMI 26.19 kg/m²   APPEARANCE: Well nourished, well developed female, pleasant and overweight, alert and oriented in no acute distress.   HEAD: Nontender. Full range of motion.  EYES: PERRL, conjunctiva pink, lids no edema.  EARS: External canal patent, no swelling or redness. TM's shiny and clear.  NOSE: Mucosa and turbinates pink, not swollen. No discharge. Nontender sinuses.  THROAT: No pharyngeal erythema or exudate. No stridor.   NECK: Supple, no mass, thyroid not enlarged.  NODES: No cervical, axillary or inguinal lymph node enlargement.  CHEST: Normal respiratory effort. Lungs clear to auscultation.  CARDIOVASCULAR: Normal S1, S2. No rubs, murmurs or gallops. PMI not displaced. No carotid bruit. Pedal pulses palpable bilaterally. No edema.  ABDOMEN: Bowel sounds present. Not distended. Soft. No tenderness, masses or organomegaly.  BREAST EXAM: Symmetrical, no external lesions, no discharge, no masses palpated.  PELVIC EXAM: No external lesions noted, no discharge, cervix appears normal, bimanual exam showed no uterine " abnormalities and no adnexal masses. Urethra and bladder intact.  RECTAL EXAM: Not performed.   MUSCULOSKELETAL: No joint deformities or stiffness. She is ambulatory without problems.  SKIN: No rashes or suspicious lesions, normal color and turgor. Benign-appearing black mole at left face near mouth (chronic, unchanged and previously seen by dermatologist per patient) and non tender skin tag at left inner thigh (chronic per patient) and non tender hair bump at left upper, outer breast (chronic per patient).  NEUROLOGIC: Cranial Nerves: II-XII grossly intact. DTR's: Knees, Ankles 2+ and equal bilaterally. Gait & Posture: Normal gait and fine motion.  PSYCHIATRIC: Patient alert, oriented x 3. Mood/Affect normal without acute anxiety and depression noted. Judgment/insight good as she makes appropriate decisions on today's examination.     ASSESSMENT:    ICD-10-CM ICD-9-CM    1. Annual physical exam Z00.00 V70.0 Ferritin      Vitamin D      CBC auto differential      TSH      Urinalysis      Comprehensive metabolic panel      Lipid panel   2. Mild vitamin D deficiency E55.9 268.9    3. Visit for screening mammogram Z12.31 V76.12 Mammo Digital Screening Bilat with CAD     PLAN:  1. Age-appropriate counseling-appropriate low-sodium, low-cholesterol, low carbohydrate diet and exercise daily, monthly breast self exam, annual wellness examination.   2. Patient advised to call for results.  3. Continue current medications.  4. Follow-up in about 1 year (around 3/15/2019) for physical.

## 2018-03-16 ENCOUNTER — TELEPHONE (OUTPATIENT)
Dept: FAMILY MEDICINE | Facility: CLINIC | Age: 48
End: 2018-03-16

## 2018-03-16 DIAGNOSIS — R31.9 HEMATURIA, UNSPECIFIED TYPE: Primary | ICD-10-CM

## 2018-03-16 NOTE — TELEPHONE ENCOUNTER
----- Message from Vale Griffin sent at 3/16/2018  3:58 PM CDT -----  Contact: pt  Pt states she was told by Dr Womack she could call today for her test results, she can be reached at [phone] Thanks

## 2018-03-16 NOTE — TELEPHONE ENCOUNTER
I advised pt of results - take MVI daily, Bactrim DS twice daily x 5 days. Stop by in 2 weeks for urine only lab; call results.  Pt verbalized understanding.

## 2018-03-19 ENCOUNTER — TELEPHONE (OUTPATIENT)
Dept: FAMILY MEDICINE | Facility: CLINIC | Age: 48
End: 2018-03-19

## 2018-03-19 RX ORDER — SULFAMETHOXAZOLE AND TRIMETHOPRIM 800; 160 MG/1; MG/1
1 TABLET ORAL 2 TIMES DAILY
Qty: 10 TABLET | Refills: 0 | Status: SHIPPED | OUTPATIENT
Start: 2018-03-19 | End: 2018-03-24

## 2018-03-19 NOTE — TELEPHONE ENCOUNTER
----- Message from Hossein Mehta sent at 3/19/2018  9:53 AM CDT -----  Contact: Pt   Pt called to check the status of medication refill callback number is..[phone] (home)

## 2018-04-02 ENCOUNTER — TELEPHONE (OUTPATIENT)
Dept: FAMILY MEDICINE | Facility: CLINIC | Age: 48
End: 2018-04-02

## 2018-04-02 NOTE — TELEPHONE ENCOUNTER
----- Message from Romi Mckinnon sent at 4/2/2018  8:35 AM CDT -----  Contact: Patient  Patient called to speak with the nurse; she was seen the other day because she has blood in her urine. She was supposed to come back today for a UA and F/U; however her cycle came on and she wants to know if she should still come.  She can be contacted at [phone].    Thanks,  Romi

## 2018-04-02 NOTE — TELEPHONE ENCOUNTER
----- Message from Nany Xiao sent at 4/2/2018  9:52 AM CDT -----  Contact: Pt  Pt is scheduled to come in today for 11 and states that her cycle cam e down and wants to know if she still needs to come in.

## 2018-04-10 ENCOUNTER — TELEPHONE (OUTPATIENT)
Dept: FAMILY MEDICINE | Facility: CLINIC | Age: 48
End: 2018-04-10

## 2018-04-10 NOTE — TELEPHONE ENCOUNTER
----- Message from Mónica Robles sent at 4/10/2018 10:48 AM CDT -----  Contact: pt  States she was suppose to be schedule on tomorrow or Thursday for her f/u UA. Please call pt at [phone]. Thank you

## 2018-04-11 ENCOUNTER — LAB VISIT (OUTPATIENT)
Dept: LAB | Facility: HOSPITAL | Age: 48
End: 2018-04-11
Attending: FAMILY MEDICINE
Payer: COMMERCIAL

## 2018-04-11 DIAGNOSIS — R31.9 HEMATURIA, UNSPECIFIED TYPE: ICD-10-CM

## 2018-04-11 LAB
BACTERIA #/AREA URNS AUTO: NORMAL /HPF
BILIRUB UR QL STRIP: NEGATIVE
CLARITY UR REFRACT.AUTO: CLEAR
COLOR UR AUTO: ABNORMAL
GLUCOSE UR QL STRIP: NEGATIVE
HGB UR QL STRIP: ABNORMAL
KETONES UR QL STRIP: NEGATIVE
LEUKOCYTE ESTERASE UR QL STRIP: NEGATIVE
MICROSCOPIC COMMENT: NORMAL
NITRITE UR QL STRIP: NEGATIVE
PH UR STRIP: 7 [PH] (ref 5–8)
PROT UR QL STRIP: NEGATIVE
RBC #/AREA URNS AUTO: 3 /HPF (ref 0–4)
SP GR UR STRIP: 1 (ref 1–1.03)
SQUAMOUS #/AREA URNS AUTO: 2 /HPF
URN SPEC COLLECT METH UR: ABNORMAL
UROBILINOGEN UR STRIP-ACNC: NEGATIVE EU/DL

## 2018-04-11 PROCEDURE — 87088 URINE BACTERIA CULTURE: CPT

## 2018-04-11 PROCEDURE — 87147 CULTURE TYPE IMMUNOLOGIC: CPT

## 2018-04-11 PROCEDURE — 87086 URINE CULTURE/COLONY COUNT: CPT

## 2018-04-11 PROCEDURE — 81001 URINALYSIS AUTO W/SCOPE: CPT

## 2018-04-12 ENCOUNTER — TELEPHONE (OUTPATIENT)
Dept: FAMILY MEDICINE | Facility: CLINIC | Age: 48
End: 2018-04-12

## 2018-04-12 LAB — BACTERIA UR CULT: NORMAL

## 2018-04-12 NOTE — TELEPHONE ENCOUNTER
----- Message from Camelia Vick sent at 4/12/2018  9:55 AM CDT -----  Contact: patient  Calling concerning her test results. Please call patient ASAP @ [phone]. Thanks, rita

## 2018-05-09 ENCOUNTER — HOSPITAL ENCOUNTER (OUTPATIENT)
Dept: RADIOLOGY | Facility: HOSPITAL | Age: 48
Discharge: HOME | End: 2018-05-09
Attending: FAMILY MEDICINE
Payer: COMMERCIAL

## 2018-05-09 VITALS — BODY MASS INDEX: 25.66 KG/M2 | WEIGHT: 154 LBS | HEIGHT: 65 IN

## 2018-05-09 DIAGNOSIS — Z12.31 VISIT FOR SCREENING MAMMOGRAM: ICD-10-CM

## 2018-05-09 PROCEDURE — 77063 BREAST TOMOSYNTHESIS BI: CPT | Mod: 26,,, | Performed by: RADIOLOGY

## 2018-05-09 PROCEDURE — 77067 SCR MAMMO BI INCL CAD: CPT | Mod: TC,PO

## 2018-05-09 PROCEDURE — 77067 SCR MAMMO BI INCL CAD: CPT | Mod: 26,,, | Performed by: RADIOLOGY

## 2018-05-10 ENCOUNTER — TELEPHONE (OUTPATIENT)
Dept: FAMILY MEDICINE | Facility: CLINIC | Age: 48
End: 2018-05-10

## 2018-05-10 NOTE — TELEPHONE ENCOUNTER
----- Message from Marlena Tipton sent at 5/10/2018 12:11 PM CDT -----  Patient requesting her mammogram orders. Please adv/call [phone].[email or web address]

## 2018-05-16 DIAGNOSIS — R82.79 POSITIVE URINE CULTURE: Primary | ICD-10-CM

## 2018-05-16 RX ORDER — ALPRAZOLAM 1 MG/1
TABLET ORAL
Qty: 30 TABLET | Refills: 0 | Status: SHIPPED | OUTPATIENT
Start: 2018-05-16 | End: 2018-07-25 | Stop reason: SDUPTHER

## 2018-05-16 RX ORDER — AMOXICILLIN 875 MG/1
875 TABLET, FILM COATED ORAL 2 TIMES DAILY
Qty: 14 TABLET | Refills: 0 | Status: SHIPPED | OUTPATIENT
Start: 2018-05-16 | End: 2018-05-23

## 2018-07-25 RX ORDER — ALPRAZOLAM 1 MG/1
TABLET ORAL
Qty: 30 TABLET | Refills: 0 | Status: SHIPPED | OUTPATIENT
Start: 2018-07-25 | End: 2018-09-28 | Stop reason: SDUPTHER

## 2018-09-05 ENCOUNTER — OFFICE VISIT (OUTPATIENT)
Dept: FAMILY MEDICINE | Facility: CLINIC | Age: 48
End: 2018-09-05
Payer: COMMERCIAL

## 2018-09-05 ENCOUNTER — LAB VISIT (OUTPATIENT)
Dept: LAB | Facility: HOSPITAL | Age: 48
End: 2018-09-05
Attending: FAMILY MEDICINE
Payer: COMMERCIAL

## 2018-09-05 VITALS
TEMPERATURE: 99 F | HEIGHT: 65 IN | DIASTOLIC BLOOD PRESSURE: 80 MMHG | BODY MASS INDEX: 25.97 KG/M2 | WEIGHT: 155.88 LBS | HEART RATE: 81 BPM | RESPIRATION RATE: 17 BRPM | OXYGEN SATURATION: 98 % | SYSTOLIC BLOOD PRESSURE: 122 MMHG

## 2018-09-05 DIAGNOSIS — R20.2 TINGLING IN EXTREMITIES: Primary | ICD-10-CM

## 2018-09-05 DIAGNOSIS — D64.9 ANEMIA, UNSPECIFIED TYPE: ICD-10-CM

## 2018-09-05 LAB
BASOPHILS # BLD AUTO: 0.03 K/UL
BASOPHILS NFR BLD: 0.7 %
DIFFERENTIAL METHOD: ABNORMAL
EOSINOPHIL # BLD AUTO: 0.1 K/UL
EOSINOPHIL NFR BLD: 1.6 %
ERYTHROCYTE [DISTWIDTH] IN BLOOD BY AUTOMATED COUNT: 12.4 %
FERRITIN SERPL-MCNC: 11 NG/ML
HCT VFR BLD AUTO: 34.8 %
HGB BLD-MCNC: 11.4 G/DL
IMM GRANULOCYTES # BLD AUTO: 0.01 K/UL
IMM GRANULOCYTES NFR BLD AUTO: 0.2 %
LYMPHOCYTES # BLD AUTO: 1.2 K/UL
LYMPHOCYTES NFR BLD: 27.4 %
MCH RBC QN AUTO: 31.8 PG
MCHC RBC AUTO-ENTMCNC: 32.8 G/DL
MCV RBC AUTO: 97 FL
MONOCYTES # BLD AUTO: 0.2 K/UL
MONOCYTES NFR BLD: 4.9 %
NEUTROPHILS # BLD AUTO: 2.9 K/UL
NEUTROPHILS NFR BLD: 65.2 %
NRBC BLD-RTO: 0 /100 WBC
PLATELET # BLD AUTO: 215 K/UL
PMV BLD AUTO: 9.9 FL
RBC # BLD AUTO: 3.58 M/UL
WBC # BLD AUTO: 4.45 K/UL

## 2018-09-05 PROCEDURE — 82728 ASSAY OF FERRITIN: CPT

## 2018-09-05 PROCEDURE — 3008F BODY MASS INDEX DOCD: CPT | Mod: CPTII,S$GLB,, | Performed by: FAMILY MEDICINE

## 2018-09-05 PROCEDURE — 99214 OFFICE O/P EST MOD 30 MIN: CPT | Mod: S$GLB,,, | Performed by: FAMILY MEDICINE

## 2018-09-05 PROCEDURE — 99999 PR PBB SHADOW E&M-EST. PATIENT-LVL IV: CPT | Mod: PBBFAC,,, | Performed by: FAMILY MEDICINE

## 2018-09-05 PROCEDURE — 36415 COLL VENOUS BLD VENIPUNCTURE: CPT | Mod: PO

## 2018-09-05 PROCEDURE — 85025 COMPLETE CBC W/AUTO DIFF WBC: CPT

## 2018-09-05 NOTE — PROGRESS NOTES
Joycelyn Wesley    Chief Complaint   Patient presents with    Hypertension    Follow-up       History of Present Illness:   Ms. Wesley comes in today accompanied by her  for evaluation of recent elevated blood pressure levels.  She reports no personal history or family history of hypertension.  She states she has not been eating much but occasionally eats sweets.  She states she does not exercise, has not been taking  OTC sinus medications, or made any changes in her diet recently.    She reports over the last 2-3 days blood pressure levels checked with wrist monitor most times range 134-165/'s at which time she reports having dizziness with room spinning sensation along with blurred vision as she sat walking TV.  She states as she stood to walk she fell onto her back into a corner at the wall without head trauma; she states her blood pressure was elevated then.  She denies head trauma, syncopal episodes.    She reports having rare headaches, occasional but rare anterior chest discomfort, rare left abdominal/flank pain (with previous unremarkable workup), and constant but intermittent tingling sensation in her legs and arms.  She states she always feels cold.  She states her most episodes of recent menstrual flow have been heavy for 8 days.    She reports having occasional anxiety and insomnia but takes Xanax with help.    Otherwise, she denies having fever, chills, fatigue, appetite changes; shortness of breath, cough, wheezing; palpitations, leg swelling; nausea, vomiting, diarrhea, constipation; unusual urinary symptoms; hot intolerance; back pain; depression, homicidal or suicidal thoughts.    She reports blood pressure 122/80 at home earlier today.    Labs:                      FERRITIN                 19 (L)              03/15/2018             WBC                      4.27                03/15/2018                 HGB                      11.3 (L)            03/15/2018                 HCT                       34.2 (L)            03/15/2018                 MCV                      96                  03/15/2018                 PLT                      196                 03/15/2018                  Current Outpatient Medications   Medication Sig    vitamin D (VITAMIN D3) 1000 units Tab Take 1,000 Units by mouth 2 (two) times daily.     ALPRAZolam (XANAX) 1 MG tablet TAKE 1 TABLET BY MOUTH ONCE DAILY AS NEEDED FOR INSOMNIA OR ANXIETY       Review of Systems   Constitutional: Negative for activity change, appetite change, chills, fatigue and fever.   HENT: Negative for congestion, postnasal drip, rhinorrhea, sinus pressure, sinus pain, sneezing and sore throat.    Eyes: Positive for visual disturbance. Negative for pain, discharge, redness and itching.   Respiratory: Negative for cough, shortness of breath and wheezing.    Cardiovascular: Positive for chest pain. Negative for palpitations and leg swelling.   Gastrointestinal: Positive for abdominal pain. Negative for constipation, diarrhea, nausea and vomiting.        Denies indigestion, heartburn.   Genitourinary: Positive for menstrual problem. Negative for difficulty urinating.   Musculoskeletal: Negative for back pain.   Neurological: Positive for dizziness and headaches. Negative for syncope.        See history of present illness.    Psychiatric/Behavioral: Positive for sleep disturbance. Negative for dysphoric mood and suicidal ideas. The patient is nervous/anxious.         Negative for homicidal ideas.       Objective:  Physical Exam   Constitutional: She is oriented to person, place, and time. She appears well-developed and well-nourished. No distress.   Pleasant.   HENT:   Head: Normocephalic and atraumatic.   Right Ear: External ear normal.   Left Ear: External ear normal.   Nose: Nose normal.   Mouth/Throat: Oropharynx is clear and moist. No oropharyngeal exudate.   Eyes: Conjunctivae and EOM are normal. Pupils are equal, round, and reactive  to light. Right eye exhibits no discharge.   Neck: Normal range of motion. Neck supple. No thyromegaly present.   Cardiovascular: Normal rate, regular rhythm and intact distal pulses.   No murmur heard.  Pulmonary/Chest: Effort normal and breath sounds normal. No respiratory distress. She has no wheezes.   Abdominal: Soft. Bowel sounds are normal. She exhibits no distension and no mass. There is no tenderness. There is no rebound and no guarding.   Musculoskeletal: Normal range of motion. She exhibits no edema or tenderness.   She is ambulatory without problems.    Lymphadenopathy:     She has no cervical adenopathy.   Neurological: She is alert and oriented to person, place, and time. She has normal reflexes.   Skin: She is not diaphoretic.   Psychiatric: She has a normal mood and affect. Her behavior is normal. Judgment and thought content normal.   Vitals reviewed.      ASSESSMENT:  1. Tingling in extremities    2. Anemia, unspecified type        PLAN:  Joycelyn was seen today for hypertension and follow-up.    Diagnoses and all orders for this visit:    Tingling in extremities  -     Ambulatory referral to Neurology    Anemia, unspecified type  -     Ferritin; Future  -     CBC auto differential; Future    Patient advised to call for results.  Continue current medications, follow low sodium, low cholesterol, low carb diet, daily walks.  I discussed with patient possible treatments for DALY (including possible GYN consultation for menstrual problem if a suspected cause for DALY).  I advised patient against using wrist blood pressure monitor as may give erroneous results, rather use arm monitor for blood pressure monitoring.  Follow-up if symptoms worsen or fail to improve.

## 2018-09-28 RX ORDER — ALPRAZOLAM 1 MG/1
TABLET ORAL
Qty: 30 TABLET | Refills: 0 | Status: SHIPPED | OUTPATIENT
Start: 2018-09-28 | End: 2018-12-14 | Stop reason: SDUPTHER

## 2018-10-15 ENCOUNTER — OFFICE VISIT (OUTPATIENT)
Dept: NEUROLOGY | Facility: CLINIC | Age: 48
End: 2018-10-15
Payer: COMMERCIAL

## 2018-10-15 VITALS
WEIGHT: 157.19 LBS | SYSTOLIC BLOOD PRESSURE: 124 MMHG | BODY MASS INDEX: 25.26 KG/M2 | HEIGHT: 66 IN | HEART RATE: 74 BPM | DIASTOLIC BLOOD PRESSURE: 76 MMHG

## 2018-10-15 DIAGNOSIS — R79.0 LOW FERRITIN LEVEL: ICD-10-CM

## 2018-10-15 DIAGNOSIS — R20.2 TINGLING IN EXTREMITIES: Primary | ICD-10-CM

## 2018-10-15 PROCEDURE — 99999 PR PBB SHADOW E&M-EST. PATIENT-LVL III: CPT | Mod: PBBFAC,,, | Performed by: PSYCHIATRY & NEUROLOGY

## 2018-10-15 PROCEDURE — 99214 OFFICE O/P EST MOD 30 MIN: CPT | Mod: S$GLB,,, | Performed by: PSYCHIATRY & NEUROLOGY

## 2018-10-15 PROCEDURE — 3008F BODY MASS INDEX DOCD: CPT | Mod: CPTII,S$GLB,, | Performed by: PSYCHIATRY & NEUROLOGY

## 2018-10-15 RX ORDER — FERROUS GLUCONATE 324(38)MG
324 TABLET ORAL
COMMUNITY
End: 2020-10-13

## 2018-10-15 NOTE — PROGRESS NOTES
Subjective:       Patient ID: Joycelyn Wesley is a 47 y.o. female.    Chief Complaint: Consult (Tingling in arms and legs having chills )    HPI     The patient presents with ongoing tingling in her body. It started 2-3 years ago. The symptoms come and go and affect her when she lies down at night. The patient denies pain. The tingling affect mainly her arms and legs but could fecit her head as well. No weakness and no other neurological symptoms. She was evaluated in 2016 by Dr. Terrell and he wanted to see if symptoms improve with RUIZ treatment. B12 was checked and was normal. The patient was found to have low ferritin and was started on treatment.     Review of Systems   Constitutional: Negative for appetite change and fatigue.   HENT: Negative for hearing loss and tinnitus.    Eyes: Negative for photophobia and visual disturbance.   Respiratory: Negative for apnea and shortness of breath.    Cardiovascular: Negative for chest pain and palpitations.   Gastrointestinal: Negative for nausea and vomiting.   Endocrine: Negative for cold intolerance and heat intolerance.   Genitourinary: Negative for difficulty urinating and urgency.   Musculoskeletal: Negative for arthralgias, back pain, gait problem, joint swelling, myalgias, neck pain and neck stiffness.   Skin: Negative for color change and rash.   Allergic/Immunologic: Negative for environmental allergies and immunocompromised state.   Neurological: Negative for dizziness, tremors, seizures, syncope, facial asymmetry, speech difficulty, weakness, light-headedness, numbness and headaches.   Hematological: Negative for adenopathy. Does not bruise/bleed easily.   Psychiatric/Behavioral: Negative for agitation, behavioral problems, confusion, decreased concentration, dysphoric mood, hallucinations, self-injury, sleep disturbance and suicidal ideas. The patient is not hyperactive.          Current Outpatient Medications:     ALPRAZolam (XANAX) 1 MG tablet, TAKE 1  TABLET BY MOUTH ONCE DAILY AS NEEDED FOR INSOMNIA OR ANXIETY, Disp: 30 tablet, Rfl: 0    ferrous gluconate (FERGON) 324 MG tablet, Take 324 mg by mouth 3 (three) times daily with meals., Disp: , Rfl:     vitamin D (VITAMIN D3) 1000 units Tab, Take 1,000 Units by mouth 2 (two) times daily. , Disp: , Rfl:   Past Medical History:   Diagnosis Date    Mild vitamin D deficiency 2015    Panic attacks      Past Surgical History:   Procedure Laterality Date     SECTION, LOW TRANSVERSE      x 2    COLONOSCOPY N/A 2015    Procedure: COLONOSCOPY;  Surgeon: Salty Costello MD;  Location: [facility];  Service: Endoscopy;  Laterality: N/A;    COLONOSCOPY N/A 2015    Performed by Salty Costello MD at [facility]     Social History     Socioeconomic History    Marital status:      Spouse name: Not on file    Number of children: 3    Years of education: Not on file    Highest education level: Not on file   Social Needs    Financial resource strain: Not on file    Food insecurity - worry: Not on file    Food insecurity - inability: Not on file    Transportation needs - medical: Not on file    Transportation needs - non-medical: Not on file   Occupational History    Occupation: Homemaker   Tobacco Use    Smoking status: Never Smoker    Smokeless tobacco: Never Used   Substance and Sexual Activity    Alcohol use: No     Alcohol/week: 0.0 oz    Drug use: No    Sexual activity: Yes     Partners: Male     Birth control/protection: Surgical   Other Topics Concern    Not on file   Social History Narrative    She wears seatbelt.       Objective:     GENERAL APPEARANCE:     The patient looks comfortable.    No signs of medical or psychiatric distress.    Normal breathing pattern.    No dysmorphic features    Normal eye contact.     GENERAL MEDICAL EXAM:    HEENT:  Head is atraumatic normocephalic. No tender temporal arteries.     Neck and Axillae: No JVD. No carotid bruits. No thyromegaly. No  lymphadenopathy.    Cardiopulmonary: No cyanosis. No tachypnea. Normal respiratory effort.  Clear breath sounds. Normal heart sounds with regular rhythm and no murmurs.    Gastrointestinal:  No stomas or lesions. No hernias.  Abdomen is soft non-tender. No masses or organomegaly.    Skin, Hair and Nails: No pathognonomic skin rash. No neurofibromatosis.   No stigmata of autoimmune disease.     Limbs: No varicose veins. No edema. Symmetric pulses.     Muskoskeletal: No deformities.No spine tenderness.   No signs of longstanding neuropathy. No dislocations or fractures.        Neurologic Exam     Mental Status   Oriented to person, place, and time.   Registration: recalls 3 of 3 objects. Recall at 5 minutes: recalls 3 of 3 objects. Follows 3 step commands.   Attention: normal. Concentration: normal.   Speech: speech is normal   Level of consciousness: alert  Knowledge: good and consistent with education. Able to perform simple calculations.   Able to name object. Able to read. Able to repeat. Able to write. Normal comprehension.     Cranial Nerves     CN II   Visual fields full to confrontation.   Visual acuity: normal  Right visual field deficit: none  Left visual field deficit: none     CN III, IV, VI   Pupils are equal, round, and reactive to light.  Extraocular motions are normal.   Right pupil: Size: 2 mm. Shape: regular. Reactivity: brisk. Consensual response: intact. Accommodation: intact.   Left pupil: Size: 2 mm. Shape: regular. Reactivity: brisk. Consensual response: intact. Accommodation: intact.   CN III: no CN III palsy  CN VI: no CN VI palsy  Nystagmus: none   Diplopia: none  Ophthalmoparesis: none  Upgaze: normal  Downgaze: normal  Conjugate gaze: present  Vestibulo-ocular reflex: present    CN V   Facial sensation intact.   Right facial sensation deficit: none  Left facial sensation deficit: none  Right corneal reflex: normal  Left corneal reflex: normal    CN VII   Right facial weakness: none  Left  facial weakness: none  Right taste: normal  Left taste: normal    CN VIII   CN VIII normal.   Hearing: intact  Right Rinne: AC > BC  Left Rinne: AC > BC  Vital: does not lateralize     CN IX, X   CN IX normal.   CN X normal.   Palate: symmetric  Right gag reflex: normal  Left gag reflex: normal    CN XI   CN XI normal.   Right sternocleidomastoid strength: normal  Left sternocleidomastoid strength: normal  Right trapezius strength: normal  Left trapezius strength: normal    CN XII   CN XII normal.   Tongue: not atrophic  Fasciculations: absent  Tongue deviation: none    Motor Exam   Muscle bulk: normal  Overall muscle tone: normal  Right arm tone: normal  Left arm tone: normal  Right arm pronator drift: absent  Left arm pronator drift: absent  Right leg tone: normal  Left leg tone: normal    Strength   Strength 5/5 throughout.   Right neck flexion: 5/5  Left neck flexion: 5/5  Right neck extension: 5/5  Left neck extension: 5/5  Right deltoid: 5/5  Left deltoid: 5/5  Right biceps: 5/5  Left biceps: 5/5  Right triceps: 5/5  Left triceps: 5/5  Right wrist flexion: 5/5  Left wrist flexion: 5/5  Right wrist extension: 5/5  Left wrist extension: 5/5  Right interossei: 5/5  Left interossei: 5/5  Right abdominals: 5/5  Left abdominals: 5/5  Right iliopsoas: 5/5  Left iliopsoas: 5/5  Right quadriceps: 5/5  Left quadriceps: 5/5  Right hamstrin/5  Left hamstrin/5  Right glutei: 5/5  Left glutei: 5/5  Right anterior tibial: 5/5  Left anterior tibial: 5/5  Right posterior tibial: 5/5  Left posterior tibial: 5/5  Right peroneal: 5/5  Left peroneal: 5/5  Right gastroc: 5/5  Left gastroc: 5/5    Sensory Exam   Light touch normal.   Right arm light touch: normal  Left arm light touch: normal  Right leg light touch: normal  Left leg light touch: normal  Vibration normal.   Right arm vibration: normal  Left arm vibration: normal  Right leg vibration: normal  Left leg vibration: normal  Proprioception normal.   Right arm  proprioception: normal  Left arm proprioception: normal  Right leg proprioception: normal  Left leg proprioception: normal  Pinprick normal.   Right arm pinprick: normal  Left arm pinprick: normal  Right leg pinprick: normal  Left leg pinprick: normal  Graphesthesia: normal  Stereognosis: normal    Gait, Coordination, and Reflexes     Gait  Gait: normal    Coordination   Romberg: negative  Finger to nose coordination: normal  Heel to shin coordination: normal  Tandem walking coordination: normal    Tremor   Resting tremor: absent  Intention tremor: absent  Action tremor: absent    Reflexes   Right brachioradialis: 2+  Left brachioradialis: 2+  Right biceps: 2+  Left biceps: 2+  Right triceps: 2+  Left triceps: 2+  Right patellar: 2+  Left patellar: 2+  Right achilles: 2+  Left achilles: 2+  Right : 2+  Left : 2+  Right plantar: normal  Left plantar: normal  Right Horton: absent  Left Horton: absent  Right ankle clonus: absent  Left ankle clonus: absent  Right pendular knee jerk: absent  Left pendular knee jerk: absent      Lab Results   Component Value Date    WBC 4.45 09/05/2018    HGB 11.4 (L) 09/05/2018    HCT 34.8 (L) 09/05/2018    MCV 97 09/05/2018     09/05/2018     Sodium   Date Value Ref Range Status   03/15/2018 139 136 - 145 mmol/L Final     Potassium   Date Value Ref Range Status   03/15/2018 4.0 3.5 - 5.1 mmol/L Final     Chloride   Date Value Ref Range Status   03/15/2018 104 95 - 110 mmol/L Final     CO2   Date Value Ref Range Status   03/15/2018 28 23 - 29 mmol/L Final     Glucose   Date Value Ref Range Status   03/15/2018 75 70 - 110 mg/dL Final     BUN, Bld   Date Value Ref Range Status   03/15/2018 13 6 - 20 mg/dL Final     Creatinine   Date Value Ref Range Status   03/15/2018 0.8 0.5 - 1.4 mg/dL Final     Calcium   Date Value Ref Range Status   03/15/2018 9.4 8.7 - 10.5 mg/dL Final     Total Protein   Date Value Ref Range Status   03/15/2018 7.5 6.0 - 8.4 g/dL Final     Albumin    Date Value Ref Range Status   03/15/2018 3.5 3.5 - 5.2 g/dL Final     Total Bilirubin   Date Value Ref Range Status   03/15/2018 0.3 0.1 - 1.0 mg/dL Final     Comment:     For infants and newborns, interpretation of results should be based  on gestational age, weight and in agreement with clinical  observations.  Premature Infant recommended reference ranges:  Up to 24 hours.............<8.0 mg/dL  Up to 48 hours............<12.0 mg/dL  3-5 days..................<15.0 mg/dL  6-29 days.................<15.0 mg/dL       Alkaline Phosphatase   Date Value Ref Range Status   03/15/2018 44 (L) 55 - 135 U/L Final     AST   Date Value Ref Range Status   03/15/2018 15 10 - 40 U/L Final     ALT   Date Value Ref Range Status   03/15/2018 8 (L) 10 - 44 U/L Final     Anion Gap   Date Value Ref Range Status   03/15/2018 7 (L) 8 - 16 mmol/L Final     eGFR if    Date Value Ref Range Status   03/15/2018 >60.0 >60 mL/min/1.73 m^2 Final     eGFR if non    Date Value Ref Range Status   03/15/2018 >60.0 >60 mL/min/1.73 m^2 Final     Comment:     Calculation used to obtain the estimated glomerular filtration  rate (eGFR) is the CKD-EPI equation.        Lab Results   Component Value Date    YLVSJEOD43 1911 (H) 09/29/2015     Lab Results   Component Value Date    TSH 0.778 03/15/2018           Low ferritin     Assessment:       1. Tingling in extremities    2. Low ferritin level        Plan:       Will wait 3 months to see if Fe replenishment will alleviate the symptoms which are not localizable.    If symptoms persist will consider neuroimaging and NCS/EMG      RTC in 3 months

## 2018-10-15 NOTE — LETTER
October 15, 2018      Jenn Womack MD  [address]  [facility] [address]           formerly [facility]  [address] [place] [address]  Phone: [phone]  Fax: [phone]          Patient: Joycelyn Wesley   MR Number: [id number]   YOB: 1970   Date of Visit: 10/15/2018       Dear Dr. Jenn Womack:    Thank you for referring Joycelyn Wesley to me for evaluation. Attached you will find relevant portions of my assessment and plan of care.    If you have questions, please do not hesitate to call me. I look forward to following Joycelyn Wesley along with you.    Sincerely,    Bert Grossman MD    Enclosure  CC:  No Recipients    If you would like to receive this communication electronically, please contact [email or web address] or [phone] to request more information on [employer] access.    For providers and/or their staff who would like to refer a patient to [facility], please contact us through our one-stop-shop provider referral line, [facility], at [phone].    If you feel you have received this communication in error or would no longer like to receive these types of communications, please e-mail [email or web address]         
No

## 2018-12-14 RX ORDER — ALPRAZOLAM 1 MG/1
TABLET ORAL
Qty: 30 TABLET | Refills: 0 | Status: SHIPPED | OUTPATIENT
Start: 2018-12-14 | End: 2019-02-10 | Stop reason: SDUPTHER

## 2019-02-11 RX ORDER — ALPRAZOLAM 1 MG/1
TABLET ORAL
Qty: 30 TABLET | Refills: 0 | Status: SHIPPED | OUTPATIENT
Start: 2019-02-11 | End: 2019-04-06 | Stop reason: SDUPTHER

## 2019-02-14 ENCOUNTER — TELEPHONE (OUTPATIENT)
Dept: FAMILY MEDICINE | Facility: CLINIC | Age: 49
End: 2019-02-14

## 2019-02-14 NOTE — TELEPHONE ENCOUNTER
----- Message from Jeff Lomas sent at 2/13/2019  6:00 PM CST -----  Contact: Pt  Pt would like to be called back regarding needing a earlier appt due to having sharp pains in abdomin area on left and right side.     Pt can be reached at [phone].    Thank You.

## 2019-02-15 ENCOUNTER — OFFICE VISIT (OUTPATIENT)
Dept: FAMILY MEDICINE | Facility: CLINIC | Age: 49
End: 2019-02-15
Payer: COMMERCIAL

## 2019-02-15 VITALS
HEIGHT: 66 IN | WEIGHT: 160.06 LBS | HEART RATE: 76 BPM | TEMPERATURE: 98 F | BODY MASS INDEX: 25.72 KG/M2 | OXYGEN SATURATION: 100 % | DIASTOLIC BLOOD PRESSURE: 82 MMHG | SYSTOLIC BLOOD PRESSURE: 120 MMHG

## 2019-02-15 DIAGNOSIS — G89.29 CHRONIC BILATERAL LOWER ABDOMINAL PAIN: Primary | ICD-10-CM

## 2019-02-15 DIAGNOSIS — R10.32 CHRONIC BILATERAL LOWER ABDOMINAL PAIN: Primary | ICD-10-CM

## 2019-02-15 DIAGNOSIS — R10.31 CHRONIC BILATERAL LOWER ABDOMINAL PAIN: Primary | ICD-10-CM

## 2019-02-15 LAB
BACTERIA #/AREA URNS AUTO: NORMAL /HPF
BILIRUB UR QL STRIP: NEGATIVE
CLARITY UR REFRACT.AUTO: ABNORMAL
COLOR UR AUTO: YELLOW
GLUCOSE UR QL STRIP: NEGATIVE
HGB UR QL STRIP: ABNORMAL
KETONES UR QL STRIP: NEGATIVE
LEUKOCYTE ESTERASE UR QL STRIP: NEGATIVE
MICROSCOPIC COMMENT: NORMAL
NITRITE UR QL STRIP: NEGATIVE
PH UR STRIP: 6 [PH] (ref 5–8)
PROT UR QL STRIP: NEGATIVE
RBC #/AREA URNS AUTO: 1 /HPF (ref 0–4)
SP GR UR STRIP: 1.01 (ref 1–1.03)
SQUAMOUS #/AREA URNS AUTO: 13 /HPF
URN SPEC COLLECT METH UR: ABNORMAL
WBC #/AREA URNS AUTO: 0 /HPF (ref 0–5)

## 2019-02-15 PROCEDURE — 87086 URINE CULTURE/COLONY COUNT: CPT

## 2019-02-15 PROCEDURE — 87147 CULTURE TYPE IMMUNOLOGIC: CPT

## 2019-02-15 PROCEDURE — 99214 PR OFFICE/OUTPT VISIT, EST, LEVL IV, 30-39 MIN: ICD-10-PCS | Mod: S$GLB,,, | Performed by: FAMILY MEDICINE

## 2019-02-15 PROCEDURE — 99214 OFFICE O/P EST MOD 30 MIN: CPT | Mod: S$GLB,,, | Performed by: FAMILY MEDICINE

## 2019-02-15 PROCEDURE — 99999 PR PBB SHADOW E&M-EST. PATIENT-LVL IV: ICD-10-PCS | Mod: PBBFAC,,, | Performed by: FAMILY MEDICINE

## 2019-02-15 PROCEDURE — 3008F BODY MASS INDEX DOCD: CPT | Mod: CPTII,S$GLB,, | Performed by: FAMILY MEDICINE

## 2019-02-15 PROCEDURE — 81001 URINALYSIS AUTO W/SCOPE: CPT

## 2019-02-15 PROCEDURE — 3008F PR BODY MASS INDEX (BMI) DOCUMENTED: ICD-10-PCS | Mod: CPTII,S$GLB,, | Performed by: FAMILY MEDICINE

## 2019-02-15 PROCEDURE — 87088 URINE BACTERIA CULTURE: CPT

## 2019-02-15 PROCEDURE — 99999 PR PBB SHADOW E&M-EST. PATIENT-LVL IV: CPT | Mod: PBBFAC,,, | Performed by: FAMILY MEDICINE

## 2019-02-15 NOTE — PROGRESS NOTES
Joycelyn Wesley    Chief Complaint   Patient presents with    Flank Pain       History of Present Illness:     Ms. Wesley comes in today with complaint of having now intermittent, sharp pain at both sides of her abdomen for 1-2 months.  She denies associated fever, chills, fatigue, appetite change, nausea, vomiting, diarrhea, constipation, chest pain, palpitations, leg swelling, shortness of breath, cough, wheezing, unusual urinary symptoms, back pain, headaches, anxiety, depression, homicidal or suicidal.  She has history of left abdominal pain since 2015.  She states she does not take anything for pain.  She reports pain at 3-4/10 on pain scale at worst.  She states her maternal grandmother  age 70-80's of colon cancer.    She states her menses have been lasting for 8 days for the last 3 months.  She states she is scheduled to see GYN Dr. Regina Gupta for 1st visit on 2019.      FFS/Colonoscopy: 2015 - okay; repeat in 10 years.     2015 Abdominal ultrasound - Findings: Liver, gallbladder, and bile ducts are normal.  Spleen, pancreas, and kidneys are unremarkable.  Abdominal aorta tapers normally.  IVC unremarkable.  No ascites.   Impression: Negative abdominal ultrasound.            Current Outpatient Medications   Medication Sig    ALPRAZolam (XANAX) 1 MG tablet TAKE 1 TABLET BY MOUTH ONCE DAILY AS NEEDED FOR INSOMNIA OR ANXIETY    ferrous gluconate (FERGON) 324 MG tablet Take 324 mg by mouth 3 (three) times daily with meals.    vitamin D (VITAMIN D3) 1000 units Tab Take 1,000 Units by mouth 2 (two) times daily.      Review of Systems   Constitutional: Negative for activity change, appetite change, chills, fatigue and fever.   Respiratory: Negative for cough, shortness of breath and wheezing.    Cardiovascular: Negative for chest pain, palpitations and leg swelling.   Gastrointestinal: Positive for abdominal pain. Negative for constipation, diarrhea, nausea and  vomiting.   Genitourinary: Positive for menstrual problem. Negative for difficulty urinating.   Musculoskeletal: Negative for back pain.   Neurological: Negative for headaches.   Psychiatric/Behavioral: Negative for dysphoric mood and suicidal ideas. The patient is not nervous/anxious.         Negative for homicidal ideas.       Objective:  Physical Exam   Constitutional: She is oriented to person, place, and time. She appears well-developed and well-nourished. No distress.   Pleasant.   Cardiovascular: Normal rate, regular rhythm and intact distal pulses.   No murmur heard.  Pulmonary/Chest: Effort normal and breath sounds normal. No respiratory distress. She has no wheezes.   Abdominal: Soft. Bowel sounds are normal. She exhibits no distension and no mass. There is no tenderness. There is no rebound and no guarding.    Non tender abdomen. No CVA tenderness.   Musculoskeletal: Normal range of motion. She exhibits no edema or tenderness.   She is ambulatory without problems.    Neurological: She is alert and oriented to person, place, and time. She has normal reflexes.   Skin: She is not diaphoretic.   Psychiatric: She has a normal mood and affect. Her behavior is normal. Judgment and thought content normal.   Vitals reviewed.      ASSESSMENT:  1. Chronic bilateral lower abdominal pain        PLAN:  Joycelyn was seen today for flank pain.    Diagnoses and all orders for this visit:    Chronic bilateral lower abdominal pain  -     Urinalysis  -     Urine culture  -     Cancel: US Pelvis Complete Non OB; Future  -     US Abdomen Complete; Future  -     US Pelvis Comp with Transvag NON-OB (xpd); Future    Other orders  -     Urinalysis Microscopic       Patient advised to call for results.  Continue current medications, follow low sodium, low cholesterol, low carb diet, daily walks.  Follow-up if symptoms worsen or fail to improve.

## 2019-02-17 LAB — BACTERIA UR CULT: NORMAL

## 2019-02-18 ENCOUNTER — TELEPHONE (OUTPATIENT)
Dept: FAMILY MEDICINE | Facility: CLINIC | Age: 49
End: 2019-02-18

## 2019-02-18 DIAGNOSIS — N39.0 URINARY TRACT INFECTION WITHOUT HEMATURIA, SITE UNSPECIFIED: Primary | ICD-10-CM

## 2019-02-18 RX ORDER — AMOXICILLIN 875 MG/1
875 TABLET, FILM COATED ORAL 2 TIMES DAILY
Qty: 14 TABLET | Refills: 0 | Status: SHIPPED | OUTPATIENT
Start: 2019-02-18 | End: 2019-02-25

## 2019-02-18 NOTE — TELEPHONE ENCOUNTER
----- Message from Marlena Cassidy sent at 2/18/2019 12:29 PM CST -----  Contact: self  Pt is calling to speak with nurse in regards to urinalysis results. Please call pt back at [phone].      Thanks,   Marlena Cassidy

## 2019-02-18 NOTE — TELEPHONE ENCOUNTER
----- Message from Jenn Solis sent at 2/18/2019  3:18 PM CST -----  Contact: patient  Type:  Patient Returning Call    Who Called:Patient  Who Left Message for Patient:nurse  Does the patient know what this is regarding?: test results  Would the patient rather a call back or a response via [facility]? call  Best Call Back Number:[phone]  Additional Information:

## 2019-02-19 ENCOUNTER — TELEPHONE (OUTPATIENT)
Dept: RADIOLOGY | Facility: HOSPITAL | Age: 49
End: 2019-02-19

## 2019-02-20 ENCOUNTER — HOSPITAL ENCOUNTER (OUTPATIENT)
Dept: RADIOLOGY | Facility: HOSPITAL | Age: 49
Discharge: HOME | End: 2019-02-20
Attending: FAMILY MEDICINE
Payer: COMMERCIAL

## 2019-02-20 DIAGNOSIS — R10.31 CHRONIC BILATERAL LOWER ABDOMINAL PAIN: ICD-10-CM

## 2019-02-20 DIAGNOSIS — G89.29 CHRONIC BILATERAL LOWER ABDOMINAL PAIN: ICD-10-CM

## 2019-02-20 DIAGNOSIS — R10.32 CHRONIC BILATERAL LOWER ABDOMINAL PAIN: ICD-10-CM

## 2019-02-20 PROCEDURE — 76856 US EXAM PELVIC COMPLETE: CPT | Mod: 26,,, | Performed by: RADIOLOGY

## 2019-02-20 PROCEDURE — 76700 US EXAM ABDOM COMPLETE: CPT | Mod: TC

## 2019-02-20 PROCEDURE — 76830 TRANSVAGINAL US NON-OB: CPT | Mod: TC

## 2019-02-20 PROCEDURE — 76856 US PELVIS COMP WITH TRANSVAG NON-OB (XPD): ICD-10-PCS | Mod: 26,,, | Performed by: RADIOLOGY

## 2019-02-20 PROCEDURE — 76700 US EXAM ABDOM COMPLETE: CPT | Mod: 26,,, | Performed by: RADIOLOGY

## 2019-02-20 PROCEDURE — 76830 TRANSVAGINAL US NON-OB: CPT | Mod: 26,,, | Performed by: RADIOLOGY

## 2019-02-20 PROCEDURE — 76830 US PELVIS COMP WITH TRANSVAG NON-OB (XPD): ICD-10-PCS | Mod: 26,,, | Performed by: RADIOLOGY

## 2019-02-20 PROCEDURE — 76700 US ABDOMEN COMPLETE: ICD-10-PCS | Mod: 26,,, | Performed by: RADIOLOGY

## 2019-02-22 ENCOUNTER — TELEPHONE (OUTPATIENT)
Dept: FAMILY MEDICINE | Facility: CLINIC | Age: 49
End: 2019-02-22

## 2019-02-22 NOTE — TELEPHONE ENCOUNTER
----- Message from Yessica Houser sent at 2/21/2019  5:16 PM CST -----  Contact: self  Pt missed a call and would the nurse to return their call.    Pt can be reached at [phone].

## 2019-02-22 NOTE — TELEPHONE ENCOUNTER
Pelvic U/S - she has enlarged uterus with fibroids and thickened uterine lining, likely cause for her longer menstrual flow.  She shoulder nnformation with her gynecologist.  Abdominal U/S Normal.   Thanks.

## 2019-02-22 NOTE — TELEPHONE ENCOUNTER
----- Message from Camelia Vick sent at 2/22/2019  1:52 PM CST -----  Contact: patient  Type:  Test Results    Who Called: patient  Name of Test (Lab/Mammo/Etc): pelvic and abdomen US  Date of Test: [id number]  Ordering Provider: Dr. Womack  Where the test was performed: [facility] @   Would the patient rather a call back or a response via [facility]? call  Best Call Back Number: [phone]  Additional Information: Calling to get a referral to Dr. Regina Gupta , GYN @ [phone] to get a sooner appointment regarding her test results.

## 2019-02-26 ENCOUNTER — TELEPHONE (OUTPATIENT)
Dept: FAMILY MEDICINE | Facility: CLINIC | Age: 49
End: 2019-02-26

## 2019-02-26 NOTE — TELEPHONE ENCOUNTER
----- Message from Lydia Moran sent at 2/26/2019  2:15 PM CST -----  Contact: Patient   Type:  Patient Returning Call    Who Called: Joycelyn  Who Left Message for Patient: No message was left  Does the patient know what this is regarding?: She is not sure   Would the patient rather a call back or a response via [facility]? Call back   Best Call Back Number: Please call her at [phone]  Additional Information: She had a miss call

## 2019-04-07 RX ORDER — ALPRAZOLAM 1 MG/1
TABLET ORAL
Qty: 30 TABLET | Refills: 0 | Status: SHIPPED | OUTPATIENT
Start: 2019-04-07 | End: 2019-05-29 | Stop reason: SDUPTHER

## 2019-05-30 RX ORDER — ALPRAZOLAM 1 MG/1
TABLET ORAL
Qty: 30 TABLET | Refills: 0 | Status: SHIPPED | OUTPATIENT
Start: 2019-05-30 | End: 2019-07-22 | Stop reason: SDUPTHER

## 2019-05-30 NOTE — TELEPHONE ENCOUNTER
----- Message from Nany Xiao sent at 5/30/2019  3:06 PM CDT -----  Contact: Pt  Please give pt a call at .[phone] (home) she is calling to check the status of a refill request she put in

## 2019-05-30 NOTE — TELEPHONE ENCOUNTER
----- Message from Aleksandra Slater sent at 5/30/2019 12:34 PM CDT -----  Contact: Pt   Pt is calling regarding requesting to have nurse call pt back. Pt states that she is checking the status of refill on ALPRAZolam (XANAX) 1 MG tablet. Pt states that pharmacy is requesting an Authorization from Dr. Womack. .[phone] (home)       [facility] [address] [address] [facility]  [address] [facility] [address]  Phone: [phone] Fax: [phone]        ..Thank You  Meghan Slater

## 2019-05-31 ENCOUNTER — TELEPHONE (OUTPATIENT)
Dept: FAMILY MEDICINE | Facility: CLINIC | Age: 49
End: 2019-05-31

## 2019-05-31 NOTE — TELEPHONE ENCOUNTER
----- Message from Vale Griffin sent at 5/31/2019  9:01 AM CDT -----  Contact: pt   Pt stated she calling about a prescription for (ALPRAZolam (XANAX) 1 MG tablet) that should have been sent to [facility], she want to know if it has been called in, she can be reached at [phone] Thanks      [facility] [address] [place] [address] [place]  [address] [facility]  [address] [facility]  [place] [address]  Phone: [phone] Fax: [phone]

## 2019-07-22 RX ORDER — ALPRAZOLAM 1 MG/1
TABLET ORAL
Qty: 30 TABLET | Refills: 0 | Status: SHIPPED | OUTPATIENT
Start: 2019-07-22 | End: 2019-09-08 | Stop reason: SDUPTHER

## 2019-07-22 NOTE — TELEPHONE ENCOUNTER
----- Message from Stephanie Moran sent at 7/22/2019  3:05 PM CDT -----  Contact: pt   Type:  RX Refill Request    Who Called: pt   Refill or New Rx:refill  RX Name and Strength: ALPRAZolam (XANAX) 1 MG tablet  How is the patient currently taking it? (ex. 1XDay):  Is this a 30 day or 90 day RX: 30  Preferred Pharmacy with phone number:  [facility] [address] [place], [place] - [address] [address] [facility]  [address]  [facility] [address]  Phone: [phone] Fax: [phone]  Local or Mail Order:local   Ordering Provider: Vu  Would the patient rather a call back or a response via [facility]? Call back   Best Call Back Number: [phone] (home)   Additional Information: pt states that she will need a refill up to her appt day unless nurse can get her in sooner to see the doctor

## 2019-07-22 NOTE — TELEPHONE ENCOUNTER
----- Message from Stephanie Moran sent at 7/22/2019  3:05 PM CDT -----  Contact: pt   Type:  RX Refill Request    Who Called: pt   Refill or New Rx:refill  RX Name and Strength: ALPRAZolam (XANAX) 1 MG tablet  How is the patient currently taking it? (ex. 1XDay):  Is this a 30 day or 90 day RX: 30  Preferred Pharmacy with phone number:  [facility] [address] [place], [place] - [address] [address] [facility]  [address] [address]  [facility] [address]  Phone: [phone] Fax: [phone]  Local or Mail Order:local   Ordering Provider: Vu  Would the patient rather a call back or a response via [facility]? Call back   Best Call Back Number: [phone] (home)   Additional Information: pt states that she will need a refill up to her appt day unless nurse can get her in sooner to see the doctor

## 2019-09-09 NOTE — TELEPHONE ENCOUNTER
----- Message from Miriam Alegria sent at 9/9/2019  3:16 PM CDT -----  Contact: self/[phone]  Type:  RX Refill Request    Who Called: Joycelyn Wesley    Refill or New Rx:refill  RX Name and Strength:Xanax 1 mg  How is the patient currently taking it? (ex. 1XDay): one daily  Is this a 30 day or 90 day RX:30  Preferred Pharmacy with phone number:  [id number] - [place] - [address] [facility]  [address] [address] [place] [address]  Phone: [phone] Fax: [phone]      Local or Mail Order:local  Ordering Provider:Dr Womack  Would the patient rather a call back or a response via [facility]? Call back   Best Call Back Number:[phone]  Additional Information:

## 2019-09-10 RX ORDER — ALPRAZOLAM 1 MG/1
TABLET ORAL
Qty: 30 TABLET | Refills: 0 | Status: SHIPPED | OUTPATIENT
Start: 2019-09-10 | End: 2019-11-07 | Stop reason: SDUPTHER

## 2019-09-13 ENCOUNTER — LAB VISIT (OUTPATIENT)
Dept: LAB | Facility: HOSPITAL | Age: 49
End: 2019-09-13
Payer: COMMERCIAL

## 2019-09-13 ENCOUNTER — OFFICE VISIT (OUTPATIENT)
Dept: FAMILY MEDICINE | Facility: CLINIC | Age: 49
End: 2019-09-13
Payer: COMMERCIAL

## 2019-09-13 VITALS
BODY MASS INDEX: 25.51 KG/M2 | SYSTOLIC BLOOD PRESSURE: 120 MMHG | WEIGHT: 158.75 LBS | DIASTOLIC BLOOD PRESSURE: 74 MMHG | OXYGEN SATURATION: 98 % | HEART RATE: 77 BPM | HEIGHT: 66 IN | TEMPERATURE: 99 F

## 2019-09-13 DIAGNOSIS — Z00.00 ANNUAL PHYSICAL EXAM: Primary | ICD-10-CM

## 2019-09-13 DIAGNOSIS — E89.41 HOT FLASHES DUE TO SURGICAL MENOPAUSE: ICD-10-CM

## 2019-09-13 DIAGNOSIS — Z00.00 ANNUAL PHYSICAL EXAM: ICD-10-CM

## 2019-09-13 DIAGNOSIS — E55.9 MILD VITAMIN D DEFICIENCY: ICD-10-CM

## 2019-09-13 DIAGNOSIS — R79.0 LOW FERRITIN LEVEL: ICD-10-CM

## 2019-09-13 PROBLEM — R20.2 TINGLING IN EXTREMITIES: Status: RESOLVED | Noted: 2018-10-15 | Resolved: 2019-09-13

## 2019-09-13 LAB
25(OH)D3+25(OH)D2 SERPL-MCNC: 38 NG/ML (ref 30–96)
ALBUMIN SERPL BCP-MCNC: 4 G/DL (ref 3.5–5.2)
ALP SERPL-CCNC: 78 U/L (ref 55–135)
ALT SERPL W/O P-5'-P-CCNC: 14 U/L (ref 10–44)
ANION GAP SERPL CALC-SCNC: 10 MMOL/L (ref 8–16)
AST SERPL-CCNC: 17 U/L (ref 10–40)
BACTERIA #/AREA URNS AUTO: ABNORMAL /HPF
BASOPHILS # BLD AUTO: 0.03 K/UL (ref 0–0.2)
BASOPHILS NFR BLD: 0.5 % (ref 0–1.9)
BILIRUB SERPL-MCNC: 0.4 MG/DL (ref 0.1–1)
BILIRUB UR QL STRIP: NEGATIVE
BUN SERPL-MCNC: 12 MG/DL (ref 6–20)
CALCIUM SERPL-MCNC: 9.9 MG/DL (ref 8.7–10.5)
CHLORIDE SERPL-SCNC: 99 MMOL/L (ref 95–110)
CHOLEST SERPL-MCNC: 256 MG/DL (ref 120–199)
CHOLEST/HDLC SERPL: 3.8 {RATIO} (ref 2–5)
CLARITY UR REFRACT.AUTO: ABNORMAL
CO2 SERPL-SCNC: 29 MMOL/L (ref 23–29)
COLOR UR AUTO: YELLOW
CREAT SERPL-MCNC: 0.8 MG/DL (ref 0.5–1.4)
DIFFERENTIAL METHOD: ABNORMAL
EOSINOPHIL # BLD AUTO: 0.1 K/UL (ref 0–0.5)
EOSINOPHIL NFR BLD: 1.8 % (ref 0–8)
ERYTHROCYTE [DISTWIDTH] IN BLOOD BY AUTOMATED COUNT: 12.4 % (ref 11.5–14.5)
EST. GFR  (AFRICAN AMERICAN): >60 ML/MIN/1.73 M^2
EST. GFR  (NON AFRICAN AMERICAN): >60 ML/MIN/1.73 M^2
FERRITIN SERPL-MCNC: 68 NG/ML (ref 20–300)
GLUCOSE SERPL-MCNC: 77 MG/DL (ref 70–110)
GLUCOSE UR QL STRIP: NEGATIVE
HCT VFR BLD AUTO: 41 % (ref 37–48.5)
HDLC SERPL-MCNC: 68 MG/DL (ref 40–75)
HDLC SERPL: 26.6 % (ref 20–50)
HGB BLD-MCNC: 12.9 G/DL (ref 12–16)
HGB UR QL STRIP: ABNORMAL
IMM GRANULOCYTES # BLD AUTO: 0.02 K/UL (ref 0–0.04)
IMM GRANULOCYTES NFR BLD AUTO: 0.4 % (ref 0–0.5)
KETONES UR QL STRIP: NEGATIVE
LDLC SERPL CALC-MCNC: 175.4 MG/DL (ref 63–159)
LEUKOCYTE ESTERASE UR QL STRIP: ABNORMAL
LYMPHOCYTES # BLD AUTO: 2 K/UL (ref 1–4.8)
LYMPHOCYTES NFR BLD: 34.9 % (ref 18–48)
MCH RBC QN AUTO: 30.5 PG (ref 27–31)
MCHC RBC AUTO-ENTMCNC: 31.5 G/DL (ref 32–36)
MCV RBC AUTO: 97 FL (ref 82–98)
MICROSCOPIC COMMENT: ABNORMAL
MONOCYTES # BLD AUTO: 0.3 K/UL (ref 0.3–1)
MONOCYTES NFR BLD: 5.9 % (ref 4–15)
NEUTROPHILS # BLD AUTO: 3.2 K/UL (ref 1.8–7.7)
NEUTROPHILS NFR BLD: 56.5 % (ref 38–73)
NITRITE UR QL STRIP: NEGATIVE
NONHDLC SERPL-MCNC: 188 MG/DL
NRBC BLD-RTO: 0 /100 WBC
PH UR STRIP: 6 [PH] (ref 5–8)
PLATELET # BLD AUTO: 245 K/UL (ref 150–350)
PMV BLD AUTO: 10.2 FL (ref 9.2–12.9)
POTASSIUM SERPL-SCNC: 4 MMOL/L (ref 3.5–5.1)
PROT SERPL-MCNC: 8.8 G/DL (ref 6–8.4)
PROT UR QL STRIP: NEGATIVE
RBC # BLD AUTO: 4.23 M/UL (ref 4–5.4)
RBC #/AREA URNS AUTO: 6 /HPF (ref 0–4)
SODIUM SERPL-SCNC: 138 MMOL/L (ref 136–145)
SP GR UR STRIP: 1.01 (ref 1–1.03)
SQUAMOUS #/AREA URNS AUTO: 1 /HPF
TRIGL SERPL-MCNC: 63 MG/DL (ref 30–150)
TSH SERPL DL<=0.005 MIU/L-ACNC: 1.12 UIU/ML (ref 0.4–4)
URN SPEC COLLECT METH UR: ABNORMAL
WBC # BLD AUTO: 5.58 K/UL (ref 3.9–12.7)
WBC #/AREA URNS AUTO: 11 /HPF (ref 0–5)

## 2019-09-13 PROCEDURE — 36415 COLL VENOUS BLD VENIPUNCTURE: CPT | Mod: PO

## 2019-09-13 PROCEDURE — 81001 URINALYSIS AUTO W/SCOPE: CPT

## 2019-09-13 PROCEDURE — 99396 PR PREVENTIVE VISIT,EST,40-64: ICD-10-PCS | Mod: S$GLB,,, | Performed by: FAMILY MEDICINE

## 2019-09-13 PROCEDURE — 80053 COMPREHEN METABOLIC PANEL: CPT

## 2019-09-13 PROCEDURE — 82728 ASSAY OF FERRITIN: CPT

## 2019-09-13 PROCEDURE — 99396 PREV VISIT EST AGE 40-64: CPT | Mod: S$GLB,,, | Performed by: FAMILY MEDICINE

## 2019-09-13 PROCEDURE — 99999 PR PBB SHADOW E&M-EST. PATIENT-LVL III: CPT | Mod: PBBFAC,,, | Performed by: FAMILY MEDICINE

## 2019-09-13 PROCEDURE — 84443 ASSAY THYROID STIM HORMONE: CPT

## 2019-09-13 PROCEDURE — 80061 LIPID PANEL: CPT

## 2019-09-13 PROCEDURE — 85025 COMPLETE CBC W/AUTO DIFF WBC: CPT

## 2019-09-13 PROCEDURE — 99999 PR PBB SHADOW E&M-EST. PATIENT-LVL III: ICD-10-PCS | Mod: PBBFAC,,, | Performed by: FAMILY MEDICINE

## 2019-09-13 PROCEDURE — 82306 VITAMIN D 25 HYDROXY: CPT

## 2019-09-13 NOTE — PROGRESS NOTES
HISTORY OF PRESENT ILLNESS: Ms. Wesley comes in today fasting for annual wellness examination. She reports no acute problems today.     END OF LIFE DECISION: She does not have a living will and is not sure about life support.    Current Outpatient Medications   Medication Sig    ALPRAZolam (XANAX) 1 MG tablet TAKE 1 TABLET BY MOUTH EVERY DAY AS NEEDED FOR INSOMNIA OR ANXIETY    ferrous gluconate (FERGON) 324 MG tablet Take 324 mg by mouth daily with breakfast.     vitamin D (VITAMIN D3) 1000 units Tab Take 1,000 Units by mouth 2 (two) times daily.      SCREENINGS:   Cholesterol: March 15, 2018.  FFS/Colonoscopy: November 9, 2015 - [facility]; repeat in 10 years.   Mammogram: 2019 with GYN Dr. Regina Gupta  - [facility].   GYN Exam: April 15, 2019 with GYN Dr. Regina Gupta.   Dexa Scan: Never.  Eye Exam: December 16, 2014 per patient. She wears reading glasses.  PPD: Never.  Immunizations: Td/Tdap - April 20, 2015.  Gardasil - N./A.  Zostavax - N./A.  Pneumovax - never.  Seasonal Flu - November 13, 2015. She declines.    ROS:  GENERAL: Denies fever, chills, fatigue or unusual weight change. Appetite normal. Weight 72.6 kg (160 lb 0.9 oz) at February 15, 2019 visit. Exercises some times but monitors diet.   SKIN: Denies rashes, itching, changes in mole, color or texture of skin or easy bruising.  HEAD: Denies recent head trauma or headaches.  EYES: Denies change in vision, pain, diplopia, redness or discharge. She wears readers.  EARS: Denies ear pain, discharge, vertigo or decreased hearing.  NOSE: Denies loss of smell, epistaxis or rhinitis.  MOUTH & THROAT: Denies hoarseness or change in voice. Denies excessive gum bleeding or mouth sores. Denies sore throat.  NODES: Denies swollen glands.  CHEST: Denies RUBALCAVA, wheezing, cough, or sputum production.  CARDIOVASCULAR: Denies chest pain, PND, orthopnea or reduced exercise tolerance. Denies palpitations.  ABDOMEN: Denies diarrhea, constipation, nausea, vomiting,  "abdominal pain, or blood in stool.  URINARY: Denies flank pain, dysuria or hematuria.  GENITOURINARY: Denies flank pain, dysuria, frequency or hematuria. Performs monthly breast self exams. Reports hot flashes following TAHBSO on 4/15/19 for treatment of fibroids; however, reports was prescribed Effexor to take for hot flashes (as she declined ERT) but was afraid to try due to possible side effects.   ENDOCRINE: Denies diabetes, thyroid, cholesterol problems.  HEME/LYMPH: Denies bleeding problems.   PERIPHERAL VASCULAR:Denies claudication or cyanosis.  MUSCULOSKELETAL: Denies joint stiffness, pain or swelling. Denies edema.  NEUROLOGIC: Denies history of seizures, tremors, paralysis, alteration of gait or coordination.  PSYCHIATRIC: Denies mood swings, depression, anxiety, homicidal or suicidal thoughts. Reports sleeps better with taking Alprazolam but states only takes it some times.     PE:   VS: /74   Pulse 77   Temp 98.8 °F (37.1 °C) (Oral)   Ht 5' 6" (1.676 m)   Wt 72 kg (158 lb 11.7 oz)   LMP  (Exact Date)   SpO2 98%   BMI 25.62 kg/m²   APPEARANCE:  Well nourished, well developed female, pleasant and overweight, alert and oriented in no acute distress.    HEAD: Nontender. Full range of motion.  EYES: PERRL, conjunctiva pink, lids no edema.   EARS: External canal patent, no swelling or redness. TM's shiny and clear.  NOSE: Mucosa and turbinates pink, not swollen. No discharge. Nontender sinuses.  THROAT: No pharyngeal erythema or exudate. No stridor.   NECK: Supple, no mass, thyroid not enlarged.  NODES: No cervical lymph node enlargement.  CHEST: Normal respiratory effort. Lungs clear to auscultation.  CARDIOVASCULAR: Normal S1, S2. No rubs, murmurs or gallops. PMI not displaced. No carotid bruit. Pedal pulses palpable bilaterally. No edema.  ABDOMEN: Bowel sounds present. Not distended. Soft. No tenderness, masses or organomegaly.  BREAST EXAM: Not examined but deferred to GYN.  PELVIC EXAM: Not " examined but deferred to GYN.  RECTAL EXAM: Not examined.  MUSCULOSKELETAL: No joint deformities or stiffness. She is ambulatory without problems.  SKIN: No rashes or suspicious lesions, normal color and turgor.  NEUROLOGIC:   Cranial Nerves: II-XII grossly intact.   DTR's: Knees, Ankles 2+ and equal bilaterally. Gait & Posture: Normal gait and fine motion.  PSYCHIATRIC:Patient alert, oriented x 3. Mood/Affect normal without acute anxiety or depression noted. Judgment/insight good as she makes appropriate decisions during today's exam.     ASSESSMENT:    ICD-10-CM ICD-9-CM    1. Annual physical exam Z00.00 V70.0 CBC auto differential      TSH      Urinalysis      Comprehensive metabolic panel      Lipid panel      Vitamin D      Ferritin   2. Mild vitamin D deficiency E55.9 268.9    3. Low ferritin level R79.0 790.6    4. Hot flashes due to surgical menopause E89.41 627.4      PLAN:  1. Age-appropriate counseling-appropriate low-sodium, low-cholesterol, low carbohydrate diet and exercise daily, monthly breast self exam, annual wellness examination.   2. Patient advised to call for results.  3. Continue current medications.  4. Encouraged patient to re-consider Effexor for treatment of hot flashes but also advised try OTC Estroven and/or Black cohosh.  5. Keep follow up with specialists.  6. Follow up in about 1 year (around 9/14/2020) for physical.

## 2019-09-16 ENCOUNTER — TELEPHONE (OUTPATIENT)
Dept: FAMILY MEDICINE | Facility: CLINIC | Age: 49
End: 2019-09-16

## 2019-09-16 DIAGNOSIS — N39.0 URINARY TRACT INFECTION WITHOUT HEMATURIA, SITE UNSPECIFIED: Primary | ICD-10-CM

## 2019-09-16 RX ORDER — SULFAMETHOXAZOLE AND TRIMETHOPRIM 800; 160 MG/1; MG/1
1 TABLET ORAL 2 TIMES DAILY
Qty: 10 TABLET | Refills: 0 | Status: SHIPPED | OUTPATIENT
Start: 2019-09-16 | End: 2019-09-21

## 2019-09-16 NOTE — TELEPHONE ENCOUNTER
----- Message from Shraddha Knowles sent at 9/16/2019 10:32 AM CDT -----  Contact: Pt  Pt is requesting call back in regards to test results.        Pls call back at [phone]

## 2019-09-16 NOTE — TELEPHONE ENCOUNTER
Advise pt lab results are within acceptable range except mildly high cholesterol and protein levels and abnormal urine.  I recommend she follow strict low fat, low cholesterol diet and exercise and schedule to see me in 4 months (fasting) for cholesterol and protein recheck. Needs antibiotic for abnormal urine; sent to local pharmacy. Thanks for call.

## 2019-09-16 NOTE — TELEPHONE ENCOUNTER
Advise pt WBC stands for white blood cell and RBC stands for red blood cell; both counts are normal on her recent lab results. Thanks.

## 2019-09-16 NOTE — TELEPHONE ENCOUNTER
Patient informed of results/medicaiton called in.  Patient states please explain her WBC/RBC count, its concering her from viewing online.

## 2019-09-18 ENCOUNTER — TELEPHONE (OUTPATIENT)
Dept: FAMILY MEDICINE | Facility: CLINIC | Age: 49
End: 2019-09-18

## 2019-09-18 NOTE — TELEPHONE ENCOUNTER
----- Message from Lydia Moran sent at 9/18/2019  2:49 PM CDT -----  Contact: Patient   Type:  Test Results    Who Called: Joycelyn  Name of Test (Lab/Mammo/Etc): Labs  Date of Test: Friday 9/13/19  Ordering Provider: Dr. Womack  Where the test was performed:  [facility] in [place]  Would the patient rather a call back or a response via [facility]? Call back   Best Call Back Number: Please call her at [phone]  Additional Information: n/a

## 2019-09-19 NOTE — TELEPHONE ENCOUNTER
----- Message from Camelia Vick sent at 9/19/2019  1:34 PM CDT -----  Contact: patient  Type:  Patient Returning Call    Who Called:patient  Who Left Message for Patient:nurse  Does the patient know what this is regarding?:blood results  Would the patient rather a call back or a response via [facility]? call  Best Call Back Number:[phone]  Additional Information: rita sarkar

## 2019-09-19 NOTE — TELEPHONE ENCOUNTER
Patient informed-  WBC stands for white blood cell and RBC stands for red blood cell; both counts are normal on your recent lab results.  Patient states thanks

## 2019-09-27 ENCOUNTER — PATIENT OUTREACH (OUTPATIENT)
Dept: ADMINISTRATIVE | Facility: HOSPITAL | Age: 49
End: 2019-09-27

## 2019-10-15 ENCOUNTER — TELEPHONE (OUTPATIENT)
Dept: FAMILY MEDICINE | Facility: CLINIC | Age: 49
End: 2019-10-15

## 2019-10-15 NOTE — TELEPHONE ENCOUNTER
Patient states she need to see  for a follow-up as soon as possible due to her nerves. Patient informed message would be sent to Dr. Grossman staff to contact her. Thanks

## 2019-10-15 NOTE — TELEPHONE ENCOUNTER
----- Message from Ceci Lin sent at 10/15/2019  8:33 AM CDT -----  .Type:  Patient Requesting Referral    Who Called:mr rodríguez-  Does the patient already have the specialty appointment scheduled?: no  If yes, what is the date of that appointment?  Referral to What Specialty:urology  Reason for Referral:didn't give  Does the patient want the referral with a specific physician?:no  Is the specialist [facility]?:doesn't matter  Patient Requesting a Response?:yes  Would the patient rather a call back or a response via [facility]? call  Best Call Back Number:.[phone]  Additional Information:

## 2019-11-08 RX ORDER — ALPRAZOLAM 1 MG/1
TABLET ORAL
Qty: 30 TABLET | Refills: 0 | Status: SHIPPED | OUTPATIENT
Start: 2019-11-08 | End: 2020-01-17 | Stop reason: SDUPTHER

## 2019-11-08 NOTE — TELEPHONE ENCOUNTER
----- Message from Jorge Duke sent at 11/8/2019  1:56 PM CST -----  Contact: pt   Pt calling to check on refill request for ALPRAZolam (XANAX) 1 MG tablet that was called in this morning.        [phone]        ..  [id number] - [place] - [address] AT [facility]  [address]  [place] [address]  Phone: [phone] Fax: [phone]

## 2020-01-17 RX ORDER — ALPRAZOLAM 1 MG/1
TABLET ORAL
Qty: 30 TABLET | Refills: 0 | Status: SHIPPED | OUTPATIENT
Start: 2020-01-17 | End: 2020-03-16

## 2020-01-17 NOTE — TELEPHONE ENCOUNTER
----- Message from Viki Perez sent at 1/17/2020 11:37 AM CST -----  Contact: pt  Name of Who is Calling: pt    What is the request in detail: is requesting a refill on Rx: (ALPRAZolam (XANAX) 1 MG tablet)   Please contact to further discuss and advise      Can the clinic reply by [facility]: no    What Number to Call Back if not in [facility]: [phone]

## 2020-03-16 RX ORDER — ALPRAZOLAM 1 MG/1
TABLET ORAL
Qty: 30 TABLET | Refills: 1 | Status: SHIPPED | OUTPATIENT
Start: 2020-03-16 | End: 2020-06-16

## 2020-04-07 ENCOUNTER — TELEPHONE (OUTPATIENT)
Dept: FAMILY MEDICINE | Facility: CLINIC | Age: 50
End: 2020-04-07

## 2020-04-07 NOTE — TELEPHONE ENCOUNTER
----- Message from Lydia Moran sent at 4/7/2020 10:37 AM CDT -----  Contact: Patient   Joycelyn would like a call back at [phone], Regards to some stomach pain that she is having.    Thanks  Td

## 2020-04-07 NOTE — TELEPHONE ENCOUNTER
Pt states that she does not have any abnormal symptoms as of right now, she states that she will come in the morning to give a urine sample.

## 2020-04-07 NOTE — TELEPHONE ENCOUNTER
Patient states she is having stomach pain, it comes and go. Its like a throbbing ache.  She said years ago you sent her for ultrasound and she is back feeling this way. Patient states her she is constipated a lot but its normal for her so she don't think that's the issue.

## 2020-04-07 NOTE — TELEPHONE ENCOUNTER
Advise pt I reviewed chart and 1 year ago she had urine infection (noted on urine culture) and she was treated with antibiotic.  Does she currently have abnormal urine symptoms, back pain, fever, chills?  Does she want to drop a urine off in the morning and follow up with me on Thursday afternoon for virtual visit (so I will have results to review with her)?

## 2020-04-08 ENCOUNTER — LAB VISIT (OUTPATIENT)
Dept: LAB | Facility: HOSPITAL | Age: 50
End: 2020-04-08
Attending: FAMILY MEDICINE
Payer: COMMERCIAL

## 2020-04-08 DIAGNOSIS — R10.9 ABDOMINAL PAIN, UNSPECIFIED ABDOMINAL LOCATION: ICD-10-CM

## 2020-04-08 DIAGNOSIS — R10.9 ABDOMINAL PAIN, UNSPECIFIED ABDOMINAL LOCATION: Primary | ICD-10-CM

## 2020-04-08 LAB
BACTERIA #/AREA URNS AUTO: ABNORMAL /HPF
BILIRUB UR QL STRIP: NEGATIVE
CLARITY UR REFRACT.AUTO: CLEAR
COLOR UR AUTO: ABNORMAL
GLUCOSE UR QL STRIP: NEGATIVE
HGB UR QL STRIP: ABNORMAL
KETONES UR QL STRIP: NEGATIVE
LEUKOCYTE ESTERASE UR QL STRIP: NEGATIVE
MICROSCOPIC COMMENT: ABNORMAL
NITRITE UR QL STRIP: NEGATIVE
PH UR STRIP: 7 [PH] (ref 5–8)
PROT UR QL STRIP: NEGATIVE
RBC #/AREA URNS AUTO: 6 /HPF (ref 0–4)
SP GR UR STRIP: 1 (ref 1–1.03)
SQUAMOUS #/AREA URNS AUTO: 6 /HPF
URN SPEC COLLECT METH UR: ABNORMAL
WBC #/AREA URNS AUTO: 1 /HPF (ref 0–5)

## 2020-04-08 PROCEDURE — 81001 URINALYSIS AUTO W/SCOPE: CPT

## 2020-04-08 PROCEDURE — 87086 URINE CULTURE/COLONY COUNT: CPT

## 2020-04-09 ENCOUNTER — OFFICE VISIT (OUTPATIENT)
Dept: FAMILY MEDICINE | Facility: CLINIC | Age: 50
End: 2020-04-09
Payer: COMMERCIAL

## 2020-04-09 DIAGNOSIS — R31.29 OTHER MICROSCOPIC HEMATURIA: ICD-10-CM

## 2020-04-09 DIAGNOSIS — R10.9 ABDOMINAL PAIN, UNSPECIFIED ABDOMINAL LOCATION: Primary | ICD-10-CM

## 2020-04-09 PROCEDURE — 99213 OFFICE O/P EST LOW 20 MIN: CPT | Mod: 95,,, | Performed by: FAMILY MEDICINE

## 2020-04-09 PROCEDURE — 99213 PR OFFICE/OUTPT VISIT, EST, LEVL III, 20-29 MIN: ICD-10-PCS | Mod: 95,,, | Performed by: FAMILY MEDICINE

## 2020-04-09 RX ORDER — SULFAMETHOXAZOLE AND TRIMETHOPRIM 800; 160 MG/1; MG/1
1 TABLET ORAL 2 TIMES DAILY
Qty: 10 TABLET | Refills: 0 | Status: SHIPPED | OUTPATIENT
Start: 2020-04-09 | End: 2020-04-14

## 2020-04-09 NOTE — PROGRESS NOTES
Joycelyn Wesley    Chief Complaint   Patient presents with    Lab results review       History of Present Illness:     The patient location is: Home (due to Covid-19 pandemic)  The chief complaint leading to consultation is: Abdominal pain  Visit type: Virtual visit with synchronous audio and video  Total time spent with patient: 15 minutes  Each patient to whom he or she provides medical services by telemedicine is:  (1) informed of the relationship between the physician and patient and the respective role of any other health care provider with respect to management of the patient; and (2) notified that he or she may decline to receive medical services by telemedicine and may withdraw from such care at any time.    Notes:     Ms. Wesley visits me today with complaint of having chronic, intermittent abdominal pain for years - nagging yet rarely sharp - but tolerable. She states now pain radiates from left to right side.  She states she rarely has constipation and currently does not have constipation.  She denies having fever, chills, fatigue, appetite changes; shortness of breath, cough, wheezing; chest pain, palpitations, leg swelling; nausea, vomiting, diarrhea; unusual urinary symptoms; back pain; headache; anxiety, depression, homicidal or suicidal thoughts.  She had work up for abdominal pain in February 2019 - which included abdominal ultrasound and pelvic ultrasound - with unremarkable findings except fibroid uterus.  Since then, she has received TAHBSO and states she now receives hormones - 2 pellets in her buttock - in additional to taking other oral prescription medications and supplements as prescribed by Dr. Cherry, bio-hormonal specialist.    She denies history of kidney stones and still has her gallbladder.             04/07/2020  Occult Blood UA Negative 2+   RBC, UA 0 - 4 /hpf 6   Bacteria None-Occ /hpf Moderate   Urine culture   In process           Current Outpatient Medications   Medication  Sig    ALPRAZolam (XANAX) 1 MG tablet TAKE 1 TABLET BY MOUTH ONCE DAILY AS NEEDED FOR INSOMNIA OR ANXIETY    cholecalciferol, vitamin D3, (REPLESTA) 50,000 unit Wafr Take by mouth once a week.    ferrous gluconate (FERGON) 324 MG tablet Take 324 mg by mouth daily with breakfast.     MAGNESIUM CARBONATE MISC by Misc.(Non-Drug; Combo Route) route once daily.    om 3/E/linol/ala/oleic/gla/lip (OMEGA 3-6-9 ORAL) Take by mouth once daily.    potassium 99 mg Tab Take by mouth every other day.    prasterone, DHEA, (DHEA ORAL) Take 5 mg by mouth once daily.       Review of Systems   Constitutional: Negative for appetite change, chills, fatigue and fever.   Respiratory: Negative for cough, shortness of breath and wheezing.    Gastrointestinal: Positive for abdominal pain. Negative for constipation, diarrhea, nausea and vomiting.   Genitourinary: Negative for difficulty urinating, dysuria, flank pain, frequency, hematuria, menstrual problem and urgency.   Musculoskeletal: Negative for back pain.   Skin: Negative for rash.   Neurological: Negative for headaches.   Psychiatric/Behavioral: Negative for dysphoric mood and suicidal ideas. The patient is not nervous/anxious.        Objective:  Physical Exam   Constitutional: She is oriented to person, place, and time. She appears well-developed and well-nourished. No distress.   Pleasant.   Musculoskeletal: Normal range of motion.   She moves around her home without problems during virtual visit.   Neurological: She is alert and oriented to person, place, and time.   Skin: She is not diaphoretic.   Psychiatric: She has a normal mood and affect. Her behavior is normal. Judgment and thought content normal.       ASSESSMENT:  1. Abdominal pain, unspecified abdominal location    2. Other microscopic hematuria        PLAN:  Joycelyn was seen today for lab results review.    Diagnoses and all orders for this visit:    Abdominal pain, unspecified abdominal location  -      sulfamethoxazole-trimethoprim 800-160mg (BACTRIM DS) 800-160 mg Tab; Take 1 tablet by mouth 2 (two) times daily. for 5 days    Other microscopic hematuria      Empiric treatment with Bactrim DS as noted above; medication precautions discussed with patient.  Will notify patient of urine culture result.  If no infection noted, will ask patient to repeat urinalysis and obtain kidney ultrasound for further work up.   Continue current medications, follow low sodium, low cholesterol, low carb diet, daily walks.  Follow up if symptoms worsen or fail to improve.

## 2020-04-10 LAB
BACTERIA UR CULT: NORMAL
BACTERIA UR CULT: NORMAL

## 2020-04-13 DIAGNOSIS — R31.9 HEMATURIA, UNSPECIFIED TYPE: Primary | ICD-10-CM

## 2020-04-17 ENCOUNTER — LAB VISIT (OUTPATIENT)
Dept: LAB | Facility: HOSPITAL | Age: 50
End: 2020-04-17
Attending: FAMILY MEDICINE
Payer: COMMERCIAL

## 2020-04-17 DIAGNOSIS — R31.9 HEMATURIA, UNSPECIFIED TYPE: ICD-10-CM

## 2020-04-17 LAB
BACTERIA #/AREA URNS AUTO: ABNORMAL /HPF
BILIRUB UR QL STRIP: NEGATIVE
CLARITY UR REFRACT.AUTO: ABNORMAL
COLOR UR AUTO: YELLOW
GLUCOSE UR QL STRIP: NEGATIVE
HGB UR QL STRIP: ABNORMAL
KETONES UR QL STRIP: NEGATIVE
LEUKOCYTE ESTERASE UR QL STRIP: NEGATIVE
MICROSCOPIC COMMENT: ABNORMAL
NITRITE UR QL STRIP: NEGATIVE
PH UR STRIP: 6 [PH] (ref 5–8)
PROT UR QL STRIP: NEGATIVE
RBC #/AREA URNS AUTO: 6 /HPF (ref 0–4)
SP GR UR STRIP: 1.01 (ref 1–1.03)
SQUAMOUS #/AREA URNS AUTO: 6 /HPF
URN SPEC COLLECT METH UR: ABNORMAL
WBC #/AREA URNS AUTO: 1 /HPF (ref 0–5)

## 2020-04-17 PROCEDURE — 81001 URINALYSIS AUTO W/SCOPE: CPT

## 2020-06-11 ENCOUNTER — TELEPHONE (OUTPATIENT)
Dept: RADIOLOGY | Facility: HOSPITAL | Age: 50
End: 2020-06-11

## 2020-06-15 ENCOUNTER — HOSPITAL ENCOUNTER (OUTPATIENT)
Dept: RADIOLOGY | Facility: HOSPITAL | Age: 50
Discharge: HOME | End: 2020-06-15
Attending: FAMILY MEDICINE
Payer: COMMERCIAL

## 2020-06-15 DIAGNOSIS — R31.9 HEMATURIA, UNSPECIFIED TYPE: ICD-10-CM

## 2020-06-15 PROCEDURE — 76770 US EXAM ABDO BACK WALL COMP: CPT | Mod: 26,,, | Performed by: RADIOLOGY

## 2020-06-15 PROCEDURE — 76770 US EXAM ABDO BACK WALL COMP: CPT | Mod: TC

## 2020-06-15 PROCEDURE — 76770 US RETROPERITONEAL COMPLETE: ICD-10-PCS | Mod: 26,,, | Performed by: RADIOLOGY

## 2020-10-09 ENCOUNTER — TELEPHONE (OUTPATIENT)
Dept: FAMILY MEDICINE | Facility: CLINIC | Age: 50
End: 2020-10-09

## 2020-10-09 NOTE — TELEPHONE ENCOUNTER
----- Message from Aaron Swift sent at 10/9/2020 12:19 PM CDT -----  .Type:  Sooner Apoointment Request    Caller is requesting a sooner appointment.  Caller declined first available appointment listed below.  Caller will not accept being placed on the waitlist and is requesting a message be sent to doctor.  Name of Caller:Joycelyn Lupillo  When is the first available appointment?11/25/20  Symptoms:annual check-up  Would the patient rather a call back or a response via [facility]? Sunil honeycutt  Best Call Back Number:[phone]  Additional Information:

## 2020-10-09 NOTE — TELEPHONE ENCOUNTER
Called pt to discuss appt.   No answer, pt wanted message sent to .  She does not want to wait until Nov. For appt.      Please advise.

## 2020-10-12 ENCOUNTER — TELEPHONE (OUTPATIENT)
Dept: FAMILY MEDICINE | Facility: CLINIC | Age: 50
End: 2020-10-12

## 2020-10-12 NOTE — PROGRESS NOTES
"Subjective:       Patient ID: Joycelyn Wesley is a 49 y.o. female.    Chief Complaint   Patient presents with    Abdominal Pain       HPI    Joycelyn is here today with c/o stomach pain off/on for the past few years.  Has been worked-up but reports nothing was found.  Does c/o "burning in my chest worse w/ lying down".  Denies f/c, NV, some constipation at times, sometimes diarrhea.  Has had a sore throat but that has improved.  This pain has increased her anxiety and she is concerned she might have an H-Pylori infection.  "Someone told me I might have an H-Pylori infection and she had all the same symptoms".      Review of Systems   Constitutional: Negative.    Respiratory: Negative.    Cardiovascular: Positive for chest pain (burning). Negative for palpitations and leg swelling.   Gastrointestinal: Positive for abdominal pain, constipation and diarrhea. Negative for abdominal distention, anal bleeding, blood in stool, nausea, rectal pain and vomiting.   Genitourinary: Negative.    Skin: Negative.    Neurological: Negative.    Psychiatric/Behavioral: Negative for decreased concentration, dysphoric mood, self-injury, sleep disturbance and suicidal ideas. The patient is nervous/anxious.          Review of patient's allergies indicates:  No Known Allergies      Patient Active Problem List   Diagnosis    Mild vitamin D deficiency    Low ferritin level    Hot flashes due to surgical menopause         Current Outpatient Medications:     ALPRAZolam (XANAX) 1 MG tablet, TAKE 1 TABLET BY MOUTH EVERY DAY AS NEEDED FOR ANXIETY OR INSOMNIA, Disp: 30 tablet, Rfl: 0    cholecalciferol, vitamin D3, (REPLESTA) 50,000 unit Wafr, Take by mouth once a week., Disp: , Rfl:     MAGNESIUM CARBONATE MISC, by Misc.(Non-Drug; Combo Route) route once daily., Disp: , Rfl:     om 3/E/linol/ala/oleic/gla/lip (OMEGA 3-6-9 ORAL), Take by mouth once daily., Disp: , Rfl:     potassium 99 mg Tab, Take by mouth every other day., Disp: , " "Rfl:     prasterone, DHEA, (DHEA ORAL), Take 5 mg by mouth once daily., Disp: , Rfl:         Past medical, surgical, family and social histories have been reviewed today.      Objective:     Vitals:    10/13/20 0944   BP: 120/70   Pulse: 85   Temp: 97.3 °F (36.3 °C)   TempSrc: Oral   Weight: 74.5 kg (164 lb 3.9 oz)   Height: 5' 6" (1.676 m)   PainSc: 0-No pain         Physical Exam  Vitals signs reviewed.   Constitutional:       General: She is not in acute distress.  HENT:      Head: Normocephalic and atraumatic.   Eyes:      Pupils: Pupils are equal, round, and reactive to light.   Neck:      Musculoskeletal: Normal range of motion and neck supple. No muscular tenderness.      Vascular: No carotid bruit.   Cardiovascular:      Rate and Rhythm: Normal rate and regular rhythm.      Pulses: Normal pulses.      Heart sounds: Normal heart sounds. No murmur. No gallop.    Pulmonary:      Effort: Pulmonary effort is normal.      Breath sounds: Normal breath sounds.   Abdominal:      General: Bowel sounds are normal.      Palpations: Abdomen is soft.      Tenderness: There is abdominal tenderness. There is no right CVA tenderness, left CVA tenderness, guarding or rebound.       Skin:     General: Skin is warm and dry.      Capillary Refill: Capillary refill takes less than 2 seconds.   Neurological:      General: No focal deficit present.      Mental Status: She is alert and oriented to person, place, and time.      Motor: No weakness.      Coordination: Coordination normal.      Gait: Gait normal.   Psychiatric:         Mood and Affect: Mood normal.         Behavior: Behavior normal.         Thought Content: Thought content normal.         Judgment: Judgment normal.           Diagnosis       1. Gastroesophageal reflux disease, unspecified whether esophagitis present    2. Abdominal pain, unspecified abdominal location    3. Anxiety          Assessment/ Plan     Gastroesophageal reflux disease, unspecified whether " esophagitis present --- with c/o burning in chest, stomach pain and sore throat.  Denies cough or water brash.  Tagamet HS.  Check lab.  Lifestyle and dietary changes discussed.  -     cimetidine (TAGAMET) 400 MG tablet; Take 1-2 tablets (400-800 mg total) by mouth nightly as needed.  Dispense: 60 tablet; Refill: 0  -     H. pylori antigen, stool; Future; Expected date: 10/13/2020    Abdominal pain, unspecified abdominal location  -     cimetidine (TAGAMET) 400 MG tablet; Take 1-2 tablets (400-800 mg total) by mouth nightly as needed.  Dispense: 60 tablet; Refill: 0  -     H. pylori antigen, stool; Future; Expected date: 10/13/2020    Anxiety --- refilled per request, no red flags per .  -     ALPRAZolam (XANAX) 1 MG tablet; TAKE 1 TABLET BY MOUTH EVERY DAY AS NEEDED FOR ANXIETY OR INSOMNIA  Dispense: 30 tablet; Refill: 0        Follow-up:    Lab pending.  Return prn.      SUMA Davison  [facility]

## 2020-10-12 NOTE — TELEPHONE ENCOUNTER
----- Message from Tc Cuellar sent at 10/12/2020  8:50 AM CDT -----  Contact: self  Type:  Same Day Appointment Request    Caller is requesting a same day appointment.  Caller declined first available appointment listed below.    Name of Caller:Joycelyn Wesley   When is the first available appointment?2020  Symptoms:burn in throat, side pain  Best Call Back Number:[phone]  Additional Information:

## 2020-10-13 ENCOUNTER — OFFICE VISIT (OUTPATIENT)
Dept: FAMILY MEDICINE | Facility: CLINIC | Age: 50
End: 2020-10-13
Payer: COMMERCIAL

## 2020-10-13 VITALS
HEIGHT: 66 IN | TEMPERATURE: 97 F | DIASTOLIC BLOOD PRESSURE: 70 MMHG | HEART RATE: 85 BPM | SYSTOLIC BLOOD PRESSURE: 120 MMHG | BODY MASS INDEX: 26.4 KG/M2 | WEIGHT: 164.25 LBS

## 2020-10-13 DIAGNOSIS — K21.9 GASTROESOPHAGEAL REFLUX DISEASE, UNSPECIFIED WHETHER ESOPHAGITIS PRESENT: Primary | ICD-10-CM

## 2020-10-13 DIAGNOSIS — R10.9 ABDOMINAL PAIN, UNSPECIFIED ABDOMINAL LOCATION: ICD-10-CM

## 2020-10-13 DIAGNOSIS — F41.9 ANXIETY: ICD-10-CM

## 2020-10-13 PROCEDURE — 3008F BODY MASS INDEX DOCD: CPT | Mod: CPTII,S$GLB,, | Performed by: REGISTERED NURSE

## 2020-10-13 PROCEDURE — 99999 PR PBB SHADOW E&M-EST. PATIENT-LVL III: ICD-10-PCS | Mod: PBBFAC,,, | Performed by: REGISTERED NURSE

## 2020-10-13 PROCEDURE — 99214 PR OFFICE/OUTPT VISIT, EST, LEVL IV, 30-39 MIN: ICD-10-PCS | Mod: S$GLB,,, | Performed by: REGISTERED NURSE

## 2020-10-13 PROCEDURE — 3008F PR BODY MASS INDEX (BMI) DOCUMENTED: ICD-10-PCS | Mod: CPTII,S$GLB,, | Performed by: REGISTERED NURSE

## 2020-10-13 PROCEDURE — 99214 OFFICE O/P EST MOD 30 MIN: CPT | Mod: S$GLB,,, | Performed by: REGISTERED NURSE

## 2020-10-13 PROCEDURE — 99999 PR PBB SHADOW E&M-EST. PATIENT-LVL III: CPT | Mod: PBBFAC,,, | Performed by: REGISTERED NURSE

## 2020-10-13 RX ORDER — ALPRAZOLAM 1 MG/1
TABLET ORAL
Qty: 30 TABLET | Refills: 0 | Status: SHIPPED | OUTPATIENT
Start: 2020-10-13 | End: 2020-12-11 | Stop reason: SDUPTHER

## 2020-10-13 RX ORDER — CIMETIDINE 400 MG/1
400-800 TABLET, FILM COATED ORAL NIGHTLY PRN
Qty: 60 TABLET | Refills: 0 | Status: SHIPPED | OUTPATIENT
Start: 2020-10-13 | End: 2022-06-07

## 2020-10-14 ENCOUNTER — LAB VISIT (OUTPATIENT)
Dept: LAB | Facility: HOSPITAL | Age: 50
End: 2020-10-14
Attending: FAMILY MEDICINE
Payer: COMMERCIAL

## 2020-10-14 DIAGNOSIS — K21.9 GASTROESOPHAGEAL REFLUX DISEASE, UNSPECIFIED WHETHER ESOPHAGITIS PRESENT: ICD-10-CM

## 2020-10-14 DIAGNOSIS — R10.9 ABDOMINAL PAIN, UNSPECIFIED ABDOMINAL LOCATION: ICD-10-CM

## 2020-10-14 PROCEDURE — 87338 HPYLORI STOOL AG IA: CPT

## 2020-10-21 LAB — H PYLORI AG STL QL IA: DETECTED

## 2020-10-22 DIAGNOSIS — A04.8 HELICOBACTER PYLORI INFECTION: Primary | ICD-10-CM

## 2020-10-22 RX ORDER — LANSOPRAZOLE, AMOXICILLIN, CLARITHROMYCIN 30-500-500
KIT ORAL
Qty: 1 KIT | Refills: 0 | Status: SHIPPED | OUTPATIENT
Start: 2020-10-22 | End: 2021-01-05

## 2020-10-26 NOTE — TELEPHONE ENCOUNTER
I see pt saw LINN Amaya earlier this month for side pain.  Is she okay with keep 12/7/2020 w/me for physical?

## 2020-12-11 DIAGNOSIS — F41.9 ANXIETY: ICD-10-CM

## 2020-12-11 RX ORDER — ALPRAZOLAM 1 MG/1
TABLET ORAL
Qty: 30 TABLET | Refills: 0 | Status: SHIPPED | OUTPATIENT
Start: 2020-12-11 | End: 2021-01-26

## 2020-12-11 NOTE — TELEPHONE ENCOUNTER
----- Message from Aaron Swift sent at 12/11/2020 12:47 PM CST -----  ..Type:  RX Refill Request    Who Called: Joycelyn Wesley  Refill or New Rx:  RX Name and Strength:Alprazolam  How is the patient currently taking it? (ex. 1XDay):Daily  Is this a 30 day or 90 day RX:30  Preferred Pharmacy with phone number:.  [id number] [place] [place] [address]  [address] [place] [address]  Phone: [phone] Fax: [phone]      Local or Mail Order:local  Ordering Provider:Dr. Womack  Would the patient rather a call back or a response via [facility]? Call back  Best Call Back Number:[phone]  Additional Information:

## 2020-12-15 DIAGNOSIS — F41.9 ANXIETY: ICD-10-CM

## 2020-12-15 RX ORDER — ALPRAZOLAM 1 MG/1
TABLET ORAL
Qty: 30 TABLET | Refills: 0 | OUTPATIENT
Start: 2020-12-15

## 2021-01-05 ENCOUNTER — OFFICE VISIT (OUTPATIENT)
Dept: FAMILY MEDICINE | Facility: CLINIC | Age: 51
End: 2021-01-05
Payer: COMMERCIAL

## 2021-01-05 ENCOUNTER — LAB VISIT (OUTPATIENT)
Dept: LAB | Facility: HOSPITAL | Age: 51
End: 2021-01-05
Attending: FAMILY MEDICINE
Payer: COMMERCIAL

## 2021-01-05 VITALS
WEIGHT: 162.56 LBS | DIASTOLIC BLOOD PRESSURE: 80 MMHG | RESPIRATION RATE: 16 BRPM | OXYGEN SATURATION: 97 % | TEMPERATURE: 98 F | BODY MASS INDEX: 26.13 KG/M2 | HEIGHT: 66 IN | SYSTOLIC BLOOD PRESSURE: 120 MMHG | HEART RATE: 74 BPM

## 2021-01-05 DIAGNOSIS — E55.9 MILD VITAMIN D DEFICIENCY: ICD-10-CM

## 2021-01-05 DIAGNOSIS — E89.40 SURGICAL MENOPAUSE: ICD-10-CM

## 2021-01-05 DIAGNOSIS — Z00.00 ANNUAL PHYSICAL EXAM: ICD-10-CM

## 2021-01-05 DIAGNOSIS — R79.0 LOW FERRITIN LEVEL: ICD-10-CM

## 2021-01-05 DIAGNOSIS — E66.3 OVERWEIGHT (BMI 25.0-29.9): ICD-10-CM

## 2021-01-05 DIAGNOSIS — Z00.00 ANNUAL PHYSICAL EXAM: Primary | ICD-10-CM

## 2021-01-05 LAB
BACTERIA #/AREA URNS AUTO: ABNORMAL /HPF
BASOPHILS # BLD AUTO: 0.04 K/UL (ref 0–0.2)
BASOPHILS NFR BLD: 1 % (ref 0–1.9)
BILIRUB UR QL STRIP: NEGATIVE
CLARITY UR REFRACT.AUTO: ABNORMAL
COLOR UR AUTO: YELLOW
DIFFERENTIAL METHOD: ABNORMAL
EOSINOPHIL # BLD AUTO: 0.1 K/UL (ref 0–0.5)
EOSINOPHIL NFR BLD: 1.5 % (ref 0–8)
ERYTHROCYTE [DISTWIDTH] IN BLOOD BY AUTOMATED COUNT: 12.1 % (ref 11.5–14.5)
GLUCOSE UR QL STRIP: NEGATIVE
HCT VFR BLD AUTO: 37 % (ref 37–48.5)
HGB BLD-MCNC: 11.9 G/DL (ref 12–16)
HGB UR QL STRIP: ABNORMAL
IMM GRANULOCYTES # BLD AUTO: 0 K/UL (ref 0–0.04)
IMM GRANULOCYTES NFR BLD AUTO: 0 % (ref 0–0.5)
KETONES UR QL STRIP: NEGATIVE
LEUKOCYTE ESTERASE UR QL STRIP: NEGATIVE
LYMPHOCYTES # BLD AUTO: 1.7 K/UL (ref 1–4.8)
LYMPHOCYTES NFR BLD: 41.7 % (ref 18–48)
MCH RBC QN AUTO: 31.2 PG (ref 27–31)
MCHC RBC AUTO-ENTMCNC: 32.2 G/DL (ref 32–36)
MCV RBC AUTO: 97 FL (ref 82–98)
MICROSCOPIC COMMENT: ABNORMAL
MONOCYTES # BLD AUTO: 0.3 K/UL (ref 0.3–1)
MONOCYTES NFR BLD: 6.3 % (ref 4–15)
NEUTROPHILS # BLD AUTO: 2 K/UL (ref 1.8–7.7)
NEUTROPHILS NFR BLD: 49.5 % (ref 38–73)
NITRITE UR QL STRIP: NEGATIVE
NRBC BLD-RTO: 0 /100 WBC
PH UR STRIP: 6 [PH] (ref 5–8)
PLATELET # BLD AUTO: 223 K/UL (ref 150–350)
PMV BLD AUTO: 10.3 FL (ref 9.2–12.9)
PROT UR QL STRIP: NEGATIVE
RBC # BLD AUTO: 3.82 M/UL (ref 4–5.4)
RBC #/AREA URNS AUTO: 11 /HPF (ref 0–4)
SP GR UR STRIP: 1.02 (ref 1–1.03)
SQUAMOUS #/AREA URNS AUTO: 5 /HPF
URN SPEC COLLECT METH UR: ABNORMAL
WBC # BLD AUTO: 4.1 K/UL (ref 3.9–12.7)

## 2021-01-05 PROCEDURE — 1126F AMNT PAIN NOTED NONE PRSNT: CPT | Mod: S$GLB,,, | Performed by: FAMILY MEDICINE

## 2021-01-05 PROCEDURE — 3008F BODY MASS INDEX DOCD: CPT | Mod: CPTII,S$GLB,, | Performed by: FAMILY MEDICINE

## 2021-01-05 PROCEDURE — 82306 VITAMIN D 25 HYDROXY: CPT

## 2021-01-05 PROCEDURE — 81001 URINALYSIS AUTO W/SCOPE: CPT

## 2021-01-05 PROCEDURE — 99396 PR PREVENTIVE VISIT,EST,40-64: ICD-10-PCS | Mod: S$GLB,,, | Performed by: FAMILY MEDICINE

## 2021-01-05 PROCEDURE — 36415 COLL VENOUS BLD VENIPUNCTURE: CPT | Mod: PO

## 2021-01-05 PROCEDURE — 80053 COMPREHEN METABOLIC PANEL: CPT

## 2021-01-05 PROCEDURE — 84443 ASSAY THYROID STIM HORMONE: CPT

## 2021-01-05 PROCEDURE — 3008F PR BODY MASS INDEX (BMI) DOCUMENTED: ICD-10-PCS | Mod: CPTII,S$GLB,, | Performed by: FAMILY MEDICINE

## 2021-01-05 PROCEDURE — 85025 COMPLETE CBC W/AUTO DIFF WBC: CPT

## 2021-01-05 PROCEDURE — 99396 PREV VISIT EST AGE 40-64: CPT | Mod: S$GLB,,, | Performed by: FAMILY MEDICINE

## 2021-01-05 PROCEDURE — 99999 PR PBB SHADOW E&M-EST. PATIENT-LVL IV: ICD-10-PCS | Mod: PBBFAC,,, | Performed by: FAMILY MEDICINE

## 2021-01-05 PROCEDURE — 99999 PR PBB SHADOW E&M-EST. PATIENT-LVL IV: CPT | Mod: PBBFAC,,, | Performed by: FAMILY MEDICINE

## 2021-01-05 PROCEDURE — 1126F PR PAIN SEVERITY QUANTIFIED, NO PAIN PRESENT: ICD-10-PCS | Mod: S$GLB,,, | Performed by: FAMILY MEDICINE

## 2021-01-05 PROCEDURE — 82728 ASSAY OF FERRITIN: CPT

## 2021-01-05 PROCEDURE — 80061 LIPID PANEL: CPT

## 2021-01-06 LAB
25(OH)D3+25(OH)D2 SERPL-MCNC: 32 NG/ML (ref 30–96)
ALBUMIN SERPL BCP-MCNC: 3.6 G/DL (ref 3.5–5.2)
ALP SERPL-CCNC: 58 U/L (ref 55–135)
ALT SERPL W/O P-5'-P-CCNC: 11 U/L (ref 10–44)
ANION GAP SERPL CALC-SCNC: 6 MMOL/L (ref 8–16)
AST SERPL-CCNC: 14 U/L (ref 10–40)
BILIRUB SERPL-MCNC: 0.4 MG/DL (ref 0.1–1)
BUN SERPL-MCNC: 10 MG/DL (ref 6–20)
CALCIUM SERPL-MCNC: 8.8 MG/DL (ref 8.7–10.5)
CHLORIDE SERPL-SCNC: 102 MMOL/L (ref 95–110)
CHOLEST SERPL-MCNC: 225 MG/DL (ref 120–199)
CHOLEST/HDLC SERPL: 3.8 {RATIO} (ref 2–5)
CO2 SERPL-SCNC: 28 MMOL/L (ref 23–29)
CREAT SERPL-MCNC: 0.8 MG/DL (ref 0.5–1.4)
EST. GFR  (AFRICAN AMERICAN): >60 ML/MIN/1.73 M^2
EST. GFR  (NON AFRICAN AMERICAN): >60 ML/MIN/1.73 M^2
FERRITIN SERPL-MCNC: 68 NG/ML (ref 20–300)
GLUCOSE SERPL-MCNC: 77 MG/DL (ref 70–110)
HDLC SERPL-MCNC: 60 MG/DL (ref 40–75)
HDLC SERPL: 26.7 % (ref 20–50)
LDLC SERPL CALC-MCNC: 152 MG/DL (ref 63–159)
NONHDLC SERPL-MCNC: 165 MG/DL
POTASSIUM SERPL-SCNC: 4.2 MMOL/L (ref 3.5–5.1)
PROT SERPL-MCNC: 7.7 G/DL (ref 6–8.4)
SODIUM SERPL-SCNC: 136 MMOL/L (ref 136–145)
TRIGL SERPL-MCNC: 65 MG/DL (ref 30–150)
TSH SERPL DL<=0.005 MIU/L-ACNC: 0.95 UIU/ML (ref 0.4–4)

## 2021-01-09 DIAGNOSIS — R31.29 HEMATURIA, MICROSCOPIC: Primary | ICD-10-CM

## 2021-01-18 PROBLEM — E66.3 OVERWEIGHT (BMI 25.0-29.9): Status: ACTIVE | Noted: 2021-01-18

## 2021-01-19 ENCOUNTER — TELEPHONE (OUTPATIENT)
Dept: FAMILY MEDICINE | Facility: CLINIC | Age: 51
End: 2021-01-19

## 2021-01-19 DIAGNOSIS — R31.29 HEMATURIA, MICROSCOPIC: Primary | ICD-10-CM

## 2021-01-21 ENCOUNTER — PATIENT OUTREACH (OUTPATIENT)
Dept: ADMINISTRATIVE | Facility: HOSPITAL | Age: 51
End: 2021-01-21

## 2021-01-25 ENCOUNTER — PATIENT OUTREACH (OUTPATIENT)
Dept: ADMINISTRATIVE | Facility: HOSPITAL | Age: 51
End: 2021-01-25

## 2021-03-10 ENCOUNTER — HOSPITAL ENCOUNTER (EMERGENCY)
Facility: HOSPITAL | Age: 51
Discharge: HOME | End: 2021-03-11
Attending: EMERGENCY MEDICINE
Payer: COMMERCIAL

## 2021-03-10 DIAGNOSIS — R51.9 NONINTRACTABLE HEADACHE, UNSPECIFIED CHRONICITY PATTERN, UNSPECIFIED HEADACHE TYPE: Primary | ICD-10-CM

## 2021-03-10 PROCEDURE — 25000003 PHARM REV CODE 250: Performed by: EMERGENCY MEDICINE

## 2021-03-10 PROCEDURE — 63600175 PHARM REV CODE 636 W HCPCS: Performed by: EMERGENCY MEDICINE

## 2021-03-10 PROCEDURE — 99284 EMERGENCY DEPT VISIT MOD MDM: CPT | Mod: 25

## 2021-03-10 RX ORDER — KETOROLAC TROMETHAMINE 30 MG/ML
30 INJECTION, SOLUTION INTRAMUSCULAR; INTRAVENOUS
Status: DISCONTINUED | OUTPATIENT
Start: 2021-03-10 | End: 2021-03-11 | Stop reason: HOSPADM

## 2021-03-10 RX ORDER — BUTALBITAL, ACETAMINOPHEN AND CAFFEINE 50; 325; 40 MG/1; MG/1; MG/1
1 TABLET ORAL
Status: COMPLETED | OUTPATIENT
Start: 2021-03-10 | End: 2021-03-10

## 2021-03-10 RX ADMIN — BUTALBITAL, ACETAMINOPHEN AND CAFFEINE 1 TABLET: 50; 325; 40 TABLET ORAL at 11:03

## 2021-03-11 VITALS
SYSTOLIC BLOOD PRESSURE: 123 MMHG | BODY MASS INDEX: 26.01 KG/M2 | OXYGEN SATURATION: 100 % | HEART RATE: 79 BPM | RESPIRATION RATE: 16 BRPM | WEIGHT: 161.81 LBS | TEMPERATURE: 98 F | DIASTOLIC BLOOD PRESSURE: 75 MMHG | HEIGHT: 66 IN

## 2021-03-11 RX ORDER — BUTALBITAL, ACETAMINOPHEN AND CAFFEINE 50; 325; 40 MG/1; MG/1; MG/1
1 TABLET ORAL EVERY 6 HOURS PRN
Qty: 20 TABLET | Refills: 0 | Status: SHIPPED | OUTPATIENT
Start: 2021-03-11 | End: 2021-04-10

## 2021-03-19 ENCOUNTER — TELEPHONE (OUTPATIENT)
Dept: FAMILY MEDICINE | Facility: CLINIC | Age: 51
End: 2021-03-19

## 2021-04-05 ENCOUNTER — OFFICE VISIT (OUTPATIENT)
Dept: FAMILY MEDICINE | Facility: CLINIC | Age: 51
End: 2021-04-05
Payer: COMMERCIAL

## 2021-04-05 VITALS
DIASTOLIC BLOOD PRESSURE: 78 MMHG | SYSTOLIC BLOOD PRESSURE: 118 MMHG | WEIGHT: 152 LBS | BODY MASS INDEX: 24.53 KG/M2 | RESPIRATION RATE: 16 BRPM | HEART RATE: 79 BPM | TEMPERATURE: 98 F | OXYGEN SATURATION: 98 %

## 2021-04-05 DIAGNOSIS — F51.04 CHRONIC INSOMNIA: Primary | ICD-10-CM

## 2021-04-05 DIAGNOSIS — F41.9 ANXIETY: ICD-10-CM

## 2021-04-05 DIAGNOSIS — R31.29 HEMATURIA, MICROSCOPIC: ICD-10-CM

## 2021-04-05 PROCEDURE — 99999 PR PBB SHADOW E&M-EST. PATIENT-LVL IV: ICD-10-PCS | Mod: PBBFAC,,, | Performed by: FAMILY MEDICINE

## 2021-04-05 PROCEDURE — 99999 PR PBB SHADOW E&M-EST. PATIENT-LVL IV: CPT | Mod: PBBFAC,,, | Performed by: FAMILY MEDICINE

## 2021-04-05 PROCEDURE — 99214 OFFICE O/P EST MOD 30 MIN: CPT | Mod: S$GLB,,, | Performed by: FAMILY MEDICINE

## 2021-04-05 PROCEDURE — 1126F PR PAIN SEVERITY QUANTIFIED, NO PAIN PRESENT: ICD-10-PCS | Mod: S$GLB,,, | Performed by: FAMILY MEDICINE

## 2021-04-05 PROCEDURE — 1126F AMNT PAIN NOTED NONE PRSNT: CPT | Mod: S$GLB,,, | Performed by: FAMILY MEDICINE

## 2021-04-05 PROCEDURE — 3008F PR BODY MASS INDEX (BMI) DOCUMENTED: ICD-10-PCS | Mod: CPTII,S$GLB,, | Performed by: FAMILY MEDICINE

## 2021-04-05 PROCEDURE — 3008F BODY MASS INDEX DOCD: CPT | Mod: CPTII,S$GLB,, | Performed by: FAMILY MEDICINE

## 2021-04-05 PROCEDURE — 99214 PR OFFICE/OUTPT VISIT, EST, LEVL IV, 30-39 MIN: ICD-10-PCS | Mod: S$GLB,,, | Performed by: FAMILY MEDICINE

## 2021-04-05 RX ORDER — TRAZODONE HYDROCHLORIDE 50 MG/1
50 TABLET ORAL NIGHTLY
Qty: 30 TABLET | Refills: 5 | Status: SHIPPED | OUTPATIENT
Start: 2021-04-05 | End: 2022-06-07

## 2021-04-28 ENCOUNTER — PATIENT MESSAGE (OUTPATIENT)
Dept: RESEARCH | Facility: HOSPITAL | Age: 51
End: 2021-04-28

## 2022-02-17 ENCOUNTER — TELEPHONE (OUTPATIENT)
Dept: FAMILY MEDICINE | Facility: CLINIC | Age: 52
End: 2022-02-17
Payer: COMMERCIAL

## 2022-02-17 NOTE — TELEPHONE ENCOUNTER
----- Message from Elsa Abad sent at 2/17/2022  3:38 PM CST -----  Contact: [phone]  Patient is calling in for her annual [employer] work please call her back at [phone]  Thanks.ln

## 2022-04-11 ENCOUNTER — TELEPHONE (OUTPATIENT)
Dept: FAMILY MEDICINE | Facility: CLINIC | Age: 52
End: 2022-04-11
Payer: COMMERCIAL

## 2022-04-11 NOTE — TELEPHONE ENCOUNTER
----- Message from Olena Ramos sent at 4/8/2022 12:21 PM CDT -----  Pt would like a call back in regards to her medical records. Please call her at .[phone]

## 2022-05-20 ENCOUNTER — TELEPHONE (OUTPATIENT)
Dept: FAMILY MEDICINE | Facility: CLINIC | Age: 52
End: 2022-05-20
Payer: COMMERCIAL

## 2022-05-20 DIAGNOSIS — Z00.00 ANNUAL PHYSICAL EXAM: Primary | ICD-10-CM

## 2022-05-20 NOTE — TELEPHONE ENCOUNTER
I have put the following orders EXCEPT vitamin D as can't be ordered at this time) and/or medications to this note.  Please advise pt and okay to do up to 1 week only prior to appt w/me.     Orders Placed This Encounter   Procedures    CBC Auto Differential     Standing Status:   Future     Standing Expiration Date:   7/19/2023    TSH     Standing Status:   Future     Standing Expiration Date:   7/19/2023    Urinalysis     Standing Status:   Future     Standing Expiration Date:   7/19/2023    Comprehensive Metabolic Panel     Standing Status:   Future     Standing Expiration Date:   7/19/2023    Lipid Panel     Standing Status:   Future     Standing Expiration Date:   7/19/2023    Ferritin     Standing Status:   Future     Standing Expiration Date:   7/19/2023

## 2022-05-20 NOTE — TELEPHONE ENCOUNTER
----- Message from Vale Bowen sent at 5/20/2022  1:16 PM CDT -----  Contact: LOREN OH [[id number]]  .Type:  Needs Medical Advice    Who Called: LOREN OH [[id number]]  Symptoms (please be specific):   How long has patient had these symptoms:    Pharmacy name and phone #:    Would the patient rather a call back or a response via [facility]? Call   Best Call Back Number:[phone] (home)    Additional Information: pt is requesting a callback in regards to pt need to have lab work orders sent please

## 2022-06-07 ENCOUNTER — OFFICE VISIT (OUTPATIENT)
Dept: FAMILY MEDICINE | Facility: CLINIC | Age: 52
End: 2022-06-07
Payer: COMMERCIAL

## 2022-06-07 ENCOUNTER — LAB VISIT (OUTPATIENT)
Dept: LAB | Facility: HOSPITAL | Age: 52
End: 2022-06-07
Attending: FAMILY MEDICINE
Payer: COMMERCIAL

## 2022-06-07 VITALS
BODY MASS INDEX: 26.05 KG/M2 | HEIGHT: 66 IN | OXYGEN SATURATION: 99 % | SYSTOLIC BLOOD PRESSURE: 118 MMHG | TEMPERATURE: 98 F | HEART RATE: 75 BPM | DIASTOLIC BLOOD PRESSURE: 76 MMHG | WEIGHT: 162.06 LBS

## 2022-06-07 DIAGNOSIS — Z00.00 ANNUAL PHYSICAL EXAM: Primary | ICD-10-CM

## 2022-06-07 DIAGNOSIS — R79.0 LOW FERRITIN LEVEL: ICD-10-CM

## 2022-06-07 DIAGNOSIS — E55.9 MILD VITAMIN D DEFICIENCY: ICD-10-CM

## 2022-06-07 DIAGNOSIS — E89.40 SURGICAL MENOPAUSE: ICD-10-CM

## 2022-06-07 DIAGNOSIS — Z00.00 ANNUAL PHYSICAL EXAM: ICD-10-CM

## 2022-06-07 DIAGNOSIS — E66.3 OVERWEIGHT (BMI 25.0-29.9): ICD-10-CM

## 2022-06-07 PROCEDURE — 80053 COMPREHEN METABOLIC PANEL: CPT | Performed by: FAMILY MEDICINE

## 2022-06-07 PROCEDURE — 99999 PR PBB SHADOW E&M-EST. PATIENT-LVL IV: CPT | Mod: PBBFAC,,, | Performed by: FAMILY MEDICINE

## 2022-06-07 PROCEDURE — 3074F SYST BP LT 130 MM HG: CPT | Mod: CPTII,S$GLB,, | Performed by: FAMILY MEDICINE

## 2022-06-07 PROCEDURE — 99396 PR PREVENTIVE VISIT,EST,40-64: ICD-10-PCS | Mod: S$GLB,,, | Performed by: FAMILY MEDICINE

## 2022-06-07 PROCEDURE — 99999 PR PBB SHADOW E&M-EST. PATIENT-LVL IV: ICD-10-PCS | Mod: PBBFAC,,, | Performed by: FAMILY MEDICINE

## 2022-06-07 PROCEDURE — 1160F PR REVIEW ALL MEDS BY PRESCRIBER/CLIN PHARMACIST DOCUMENTED: ICD-10-PCS | Mod: CPTII,S$GLB,, | Performed by: FAMILY MEDICINE

## 2022-06-07 PROCEDURE — 3008F PR BODY MASS INDEX (BMI) DOCUMENTED: ICD-10-PCS | Mod: CPTII,S$GLB,, | Performed by: FAMILY MEDICINE

## 2022-06-07 PROCEDURE — 85025 COMPLETE CBC W/AUTO DIFF WBC: CPT | Performed by: FAMILY MEDICINE

## 2022-06-07 PROCEDURE — 1160F RVW MEDS BY RX/DR IN RCRD: CPT | Mod: CPTII,S$GLB,, | Performed by: FAMILY MEDICINE

## 2022-06-07 PROCEDURE — 3008F BODY MASS INDEX DOCD: CPT | Mod: CPTII,S$GLB,, | Performed by: FAMILY MEDICINE

## 2022-06-07 PROCEDURE — 82728 ASSAY OF FERRITIN: CPT | Performed by: FAMILY MEDICINE

## 2022-06-07 PROCEDURE — 36415 COLL VENOUS BLD VENIPUNCTURE: CPT | Mod: PO | Performed by: FAMILY MEDICINE

## 2022-06-07 PROCEDURE — 99396 PREV VISIT EST AGE 40-64: CPT | Mod: S$GLB,,, | Performed by: FAMILY MEDICINE

## 2022-06-07 PROCEDURE — 80061 LIPID PANEL: CPT | Performed by: FAMILY MEDICINE

## 2022-06-07 PROCEDURE — 3078F PR MOST RECENT DIASTOLIC BLOOD PRESSURE < 80 MM HG: ICD-10-PCS | Mod: CPTII,S$GLB,, | Performed by: FAMILY MEDICINE

## 2022-06-07 PROCEDURE — 1159F MED LIST DOCD IN RCRD: CPT | Mod: CPTII,S$GLB,, | Performed by: FAMILY MEDICINE

## 2022-06-07 PROCEDURE — 86803 HEPATITIS C AB TEST: CPT | Performed by: FAMILY MEDICINE

## 2022-06-07 PROCEDURE — 84443 ASSAY THYROID STIM HORMONE: CPT | Performed by: FAMILY MEDICINE

## 2022-06-07 PROCEDURE — 3074F PR MOST RECENT SYSTOLIC BLOOD PRESSURE < 130 MM HG: ICD-10-PCS | Mod: CPTII,S$GLB,, | Performed by: FAMILY MEDICINE

## 2022-06-07 PROCEDURE — 3078F DIAST BP <80 MM HG: CPT | Mod: CPTII,S$GLB,, | Performed by: FAMILY MEDICINE

## 2022-06-07 PROCEDURE — 1159F PR MEDICATION LIST DOCUMENTED IN MEDICAL RECORD: ICD-10-PCS | Mod: CPTII,S$GLB,, | Performed by: FAMILY MEDICINE

## 2022-06-07 NOTE — PROGRESS NOTES
HISTORY OF PRESENT ILLNESS: Ms. Wesley comes in today not fasting (ate popcorn) and without taking medications for annual wellness examination. She reports no acute problems today.     END OF LIFE DECISION: She does not have a living will and is not sure about life support.    Current Outpatient Medications   Medication Sig    cholecalciferol, vitamin D3, (REPLESTA) 1,250 mcg (50,000 unit) Wafr Take by mouth once a week.    MAGNESIUM CARBONATE MISC by Misc.(Non-Drug; Combo Route) route once daily.    om 3/E/linol/ala/oleic/gla/lip (OMEGA 3-6-9 ORAL) Take by mouth once daily.    prasterone, DHEA, (DHEA ORAL) Take 5 mg by mouth once daily.    progesterone (PROMETRIUM) 100 MG capsule Take 100 mg by mouth once daily.     SCREENINGS:   Cholesterol: January 5, 2021.  FFS/Colonoscopy: November 9, 2015 - brunilda; repeat in 10 years.   Mammogram: Spring 2022 with GYN Dr. Regina worley per patient.   GYN Exam: March 7, 2022 pap smear with GYN Dr. Regina worley.   Dexa Scan: Never.  Eye Exam: Spring 2022 per patient. She wears reading glasses.  HCVAb: Never. She desires.  PPD: Never.    Immunizations: Td/Tdap - April 20, 2015.  Gardasil - N./A.  Zostavax - N./A.  Pneumovax - never.  Seasonal Flu - November 13, 2015. She declines.  Covid-19 vaccine series - Never. She remains undecided.    ROS:  GENERAL: Denies fever, chills, fatigue or unusual weight change. Appetite normal. Weight 68.9 kg (152 lb 0.1 oz) at April 5, 2021 visit. Not exercises but states some times monitors diet.     SKIN: Denies rashes, itching, changes in mole, color or texture of skin or easy bruising.  HEAD: Denies recent head trauma except reports slight headache at this time (likely as ready to eat).   EYES: Denies change in vision, pain, diplopia, redness or discharge. She wears readers.  EARS: Denies ear pain, discharge, vertigo or decreased hearing.  NOSE: Denies loss of smell, epistaxis or rhinitis.  MOUTH & THROAT: Denies  "hoarseness or change in voice. Denies excessive gum bleeding or mouth sores. Denies sore throat.  NODES: Denies swollen glands.  CHEST: Denies RBUALCAVA, wheezing, cough, or sputum production.  CARDIOVASCULAR: Denies chest pain, PND, orthopnea or reduced exercise tolerance. Denies palpitations.  ABDOMEN: Denies diarrhea, constipation, nausea, vomiting, abdominal pain, or blood in stool. Saw [facility] in early March 2021 and was given antibiotic therapy for H pylori at which time she stopped drinking coffee. She states she later followed with H. Pylori stool testing which was negative and was told to follow-up in 6 months and reports \[facility]\"" follow up appx. 1 to 2 months ago.  URINARY: Denies flank pain, dysuria or hematuria. Saw IRINEO Wilder with [facility] on February 18, 2021 for microscopic hematuria with unremarkable workup which included CT urography without cystoscopy.  GENITOURINARY: Denies flank pain, dysuria, frequency or hematuria. Performs monthly breast self exams.   ENDOCRINE: Denies diabetes, thyroid problems except slight cholesterol elevation in September 2019.  HEME/LYMPH: Denies bleeding problems.   PERIPHERAL VASCULAR:Denies claudication or cyanosis.  MUSCULOSKELETAL: Denies joint stiffness, pain or swelling. Denies edema.  NEUROLOGIC: Denies history of seizures, tremors, paralysis, alteration of gait or coordination.  PSYCHIATRIC: Denies mood swings, depression, anxiety, homicidal or suicidal thoughts. Reports sleeps better now and without need for sleep aid.      PE:   VS: Blood Pressure 118/76   Pulse 75   Temperature 97.8 °F (36.6 °C)   Height 5' 6" (1.676 m)   Weight 73.5 kg (162 lb 0.6 oz)   Last Menstrual Period 01/23/2019 (Exact Date) Comment: Monthly  Oxygen Saturation 99%   Body Mass Index 26.15 kg/m²   APPEARANCE:  Well nourished, well developed female, pleasant and overweight, alert and oriented in no acute distress.    HEAD: Nontender. Full " range of motion.  EYES: PERRL, conjunctiva pink, lids no edema.   EARS: External canal patent, no swelling or redness. TM's shiny and clear.  NOSE: Mucosa and turbinates pink, not swollen. No discharge. Nontender sinuses.  THROAT: No pharyngeal erythema or exudate. No stridor.   NECK: Supple, no mass, thyroid not enlarged.  NODES: No cervical lymph node enlargement.  CHEST: Normal respiratory effort. Lungs clear to auscultation.  CARDIOVASCULAR: Normal S1, S2. No rubs, murmurs or gallops. PMI not displaced. No carotid bruit. Pedal pulses palpable bilaterally. No edema.  ABDOMEN: Bowel sounds present. Not distended. Soft. No tenderness, masses or organomegaly.  BREAST EXAM: Not examined but deferred to GYN.  PELVIC EXAM: Not examined but deferred to GYN.  RECTAL EXAM: Not examined.  MUSCULOSKELETAL: No joint deformities or stiffness. She is ambulatory without problems.  SKIN: No rashes or suspicious lesions, normal color and turgor.  NEUROLOGIC:   Cranial Nerves: II-XII grossly intact.   DTR's: Knees, Ankles 2+ and equal bilaterally. Gait & Posture: Normal gait and fine motion.  PSYCHIATRIC:Patient alert, oriented x 3. Mood/Affect normal without acute anxiety or depression noted. Judgment/insight good as she makes appropriate decisions during today's exam.      ASSESSMENT:    ICD-10-CM ICD-9-CM    1. Annual physical exam  Z00.00 V70.0 CBC Auto Differential      TSH      Comprehensive Metabolic Panel      Lipid Panel      Ferritin      Hepatitis C Antibody   2. Mild vitamin D deficiency  E55.9 268.9    3. Low ferritin level  R79.0 790.6    4. Overweight (BMI 25.0-29.9)  E66.3 278.02    5. Surgical menopause  E89.40 627.4      PLAN:  1. Age-appropriate counseling-appropriate low-sodium, low-cholesterol, low carbohydrate diet and exercise daily, monthly breast self exam, annual wellness examination.   2. Patient advised to call for results.  3. Continue current medications.  4. Keep follow up with specialists.  5.  Follow up in about 1 year (around 6/7/2023) for physical.

## 2022-06-08 LAB
ALBUMIN SERPL BCP-MCNC: 3.5 G/DL (ref 3.5–5.2)
ALP SERPL-CCNC: 45 U/L (ref 55–135)
ALT SERPL W/O P-5'-P-CCNC: 11 U/L (ref 10–44)
ANION GAP SERPL CALC-SCNC: 7 MMOL/L (ref 8–16)
AST SERPL-CCNC: 17 U/L (ref 10–40)
BASOPHILS # BLD AUTO: 0.03 K/UL (ref 0–0.2)
BASOPHILS NFR BLD: 0.6 % (ref 0–1.9)
BILIRUB SERPL-MCNC: 0.5 MG/DL (ref 0.1–1)
BUN SERPL-MCNC: 9 MG/DL (ref 6–20)
CALCIUM SERPL-MCNC: 9.2 MG/DL (ref 8.7–10.5)
CHLORIDE SERPL-SCNC: 103 MMOL/L (ref 95–110)
CHOLEST SERPL-MCNC: 201 MG/DL (ref 120–199)
CHOLEST/HDLC SERPL: 3.7 {RATIO} (ref 2–5)
CO2 SERPL-SCNC: 27 MMOL/L (ref 23–29)
CREAT SERPL-MCNC: 0.8 MG/DL (ref 0.5–1.4)
DIFFERENTIAL METHOD: ABNORMAL
EOSINOPHIL # BLD AUTO: 0.1 K/UL (ref 0–0.5)
EOSINOPHIL NFR BLD: 2.2 % (ref 0–8)
ERYTHROCYTE [DISTWIDTH] IN BLOOD BY AUTOMATED COUNT: 11.9 % (ref 11.5–14.5)
EST. GFR  (AFRICAN AMERICAN): >60 ML/MIN/1.73 M^2
EST. GFR  (NON AFRICAN AMERICAN): >60 ML/MIN/1.73 M^2
FERRITIN SERPL-MCNC: 76 NG/ML (ref 20–300)
GLUCOSE SERPL-MCNC: 73 MG/DL (ref 70–110)
HCT VFR BLD AUTO: 36 % (ref 37–48.5)
HCV AB SERPL QL IA: NEGATIVE
HDLC SERPL-MCNC: 54 MG/DL (ref 40–75)
HDLC SERPL: 26.9 % (ref 20–50)
HGB BLD-MCNC: 11.6 G/DL (ref 12–16)
IMM GRANULOCYTES # BLD AUTO: 0.01 K/UL (ref 0–0.04)
IMM GRANULOCYTES NFR BLD AUTO: 0.2 % (ref 0–0.5)
LDLC SERPL CALC-MCNC: 136 MG/DL (ref 63–159)
LYMPHOCYTES # BLD AUTO: 1.5 K/UL (ref 1–4.8)
LYMPHOCYTES NFR BLD: 29.9 % (ref 18–48)
MCH RBC QN AUTO: 31.4 PG (ref 27–31)
MCHC RBC AUTO-ENTMCNC: 32.2 G/DL (ref 32–36)
MCV RBC AUTO: 98 FL (ref 82–98)
MONOCYTES # BLD AUTO: 0.3 K/UL (ref 0.3–1)
MONOCYTES NFR BLD: 5.7 % (ref 4–15)
NEUTROPHILS # BLD AUTO: 3.1 K/UL (ref 1.8–7.7)
NEUTROPHILS NFR BLD: 61.4 % (ref 38–73)
NONHDLC SERPL-MCNC: 147 MG/DL
NRBC BLD-RTO: 0 /100 WBC
PLATELET # BLD AUTO: 211 K/UL (ref 150–450)
PMV BLD AUTO: 10.4 FL (ref 9.2–12.9)
POTASSIUM SERPL-SCNC: 4 MMOL/L (ref 3.5–5.1)
PROT SERPL-MCNC: 7.3 G/DL (ref 6–8.4)
RBC # BLD AUTO: 3.69 M/UL (ref 4–5.4)
SODIUM SERPL-SCNC: 137 MMOL/L (ref 136–145)
TRIGL SERPL-MCNC: 55 MG/DL (ref 30–150)
TSH SERPL DL<=0.005 MIU/L-ACNC: 0.88 UIU/ML (ref 0.4–4)
WBC # BLD AUTO: 5.08 K/UL (ref 3.9–12.7)

## 2022-06-10 ENCOUNTER — PATIENT OUTREACH (OUTPATIENT)
Dept: ADMINISTRATIVE | Facility: HOSPITAL | Age: 52
End: 2022-06-10
Payer: COMMERCIAL

## 2022-09-27 ENCOUNTER — TELEPHONE (OUTPATIENT)
Dept: FAMILY MEDICINE | Facility: CLINIC | Age: 52
End: 2022-09-27
Payer: COMMERCIAL

## 2022-09-27 NOTE — TELEPHONE ENCOUNTER
Ask pt - Who advised she has low B1?     Also, advise pt she can get B1 (thiamine) over-the-counter.    Thanks.   Name band;

## 2022-09-27 NOTE — TELEPHONE ENCOUNTER
Pt stated that her B1 is low and would like something called in please advise     ----- Message from Dixie Yuan sent at 9/27/2022  8:56 AM CDT -----  Joycelyn states that her vitamin b1 is low and would like some mediation called in. Please call her back at  [phone].     [id number] [place] - [address] [facility]  [address] [facility]  [place] [address]  Phone: [phone] Fax: [phone]       Thanks  CF

## 2022-09-28 NOTE — TELEPHONE ENCOUNTER
She can take over-the-counter multivitamin (just read ingredients to make sure it contains thiamine, which is B1). Thanks.

## 2022-09-28 NOTE — TELEPHONE ENCOUNTER
Pt states the  who deals with her hormones ran blood work and told her her vitamin b levels are low and to f/u with pcp. Advised of otc b1 and she wants to know how much of it should she take. Please advise

## 2023-03-22 ENCOUNTER — OFFICE VISIT (OUTPATIENT)
Dept: FAMILY MEDICINE | Facility: CLINIC | Age: 53
End: 2023-03-22
Payer: COMMERCIAL

## 2023-03-22 VITALS
WEIGHT: 162.13 LBS | TEMPERATURE: 98 F | HEIGHT: 66 IN | SYSTOLIC BLOOD PRESSURE: 120 MMHG | DIASTOLIC BLOOD PRESSURE: 79 MMHG | BODY MASS INDEX: 26.06 KG/M2 | OXYGEN SATURATION: 98 % | HEART RATE: 98 BPM

## 2023-03-22 DIAGNOSIS — R10.32 ABDOMINAL PAIN, CHRONIC, LEFT LOWER QUADRANT: Primary | ICD-10-CM

## 2023-03-22 DIAGNOSIS — G89.29 ABDOMINAL PAIN, CHRONIC, LEFT LOWER QUADRANT: Primary | ICD-10-CM

## 2023-03-22 PROCEDURE — 3008F BODY MASS INDEX DOCD: CPT | Mod: CPTII,S$GLB,, | Performed by: REGISTERED NURSE

## 2023-03-22 PROCEDURE — 99999 PR PBB SHADOW E&M-EST. PATIENT-LVL IV: ICD-10-PCS | Mod: PBBFAC,,, | Performed by: REGISTERED NURSE

## 2023-03-22 PROCEDURE — 99215 PR OFFICE/OUTPT VISIT, EST, LEVL V, 40-54 MIN: ICD-10-PCS | Mod: S$GLB,,, | Performed by: REGISTERED NURSE

## 2023-03-22 PROCEDURE — 1159F MED LIST DOCD IN RCRD: CPT | Mod: CPTII,S$GLB,, | Performed by: REGISTERED NURSE

## 2023-03-22 PROCEDURE — 3078F DIAST BP <80 MM HG: CPT | Mod: CPTII,S$GLB,, | Performed by: REGISTERED NURSE

## 2023-03-22 PROCEDURE — 1159F PR MEDICATION LIST DOCUMENTED IN MEDICAL RECORD: ICD-10-PCS | Mod: CPTII,S$GLB,, | Performed by: REGISTERED NURSE

## 2023-03-22 PROCEDURE — 3078F PR MOST RECENT DIASTOLIC BLOOD PRESSURE < 80 MM HG: ICD-10-PCS | Mod: CPTII,S$GLB,, | Performed by: REGISTERED NURSE

## 2023-03-22 PROCEDURE — 3074F PR MOST RECENT SYSTOLIC BLOOD PRESSURE < 130 MM HG: ICD-10-PCS | Mod: CPTII,S$GLB,, | Performed by: REGISTERED NURSE

## 2023-03-22 PROCEDURE — 99999 PR PBB SHADOW E&M-EST. PATIENT-LVL IV: CPT | Mod: PBBFAC,,, | Performed by: REGISTERED NURSE

## 2023-03-22 PROCEDURE — 3074F SYST BP LT 130 MM HG: CPT | Mod: CPTII,S$GLB,, | Performed by: REGISTERED NURSE

## 2023-03-22 PROCEDURE — 3008F PR BODY MASS INDEX (BMI) DOCUMENTED: ICD-10-PCS | Mod: CPTII,S$GLB,, | Performed by: REGISTERED NURSE

## 2023-03-22 PROCEDURE — 99215 OFFICE O/P EST HI 40 MIN: CPT | Mod: S$GLB,,, | Performed by: REGISTERED NURSE

## 2023-03-22 NOTE — PROGRESS NOTES
"Subjective:      Joycelyn Wesley is a 52 y.o. female, here today with C/C of:  Abdominal Pain     HPI:    Joycelyn is here with c/o chronic LLQ abdominal pain.  Onset was a few years ago, intermittently flares up at times.  She is actually feeling better today, although the pain kept her up last night.  Always located to the LLQ of stomach, no radiation.  Unable to identify triggers, pain occurs randomly.  Not associated with food/meal intake.  Describes her pain as a dull ache, sharp at times, comes & goes.  Often is a "hot burning sensation" to the abdomen.  Reports some constipation but no NVD.  Remote h/o H-Pylori, was treated.  No h/o diverticulitis.      Review of Systems   Constitutional:  Negative for activity change, appetite change, chills and fever.   Respiratory: Negative.     Cardiovascular: Negative.    Gastrointestinal:  Positive for abdominal pain and constipation. Negative for abdominal distention, anal bleeding, blood in stool, diarrhea, nausea, rectal pain and vomiting.   Genitourinary: Negative.    Musculoskeletal: Negative.    Neurological: Negative.        Review of patient's allergies indicates:  No Known Allergies    Patient Active Problem List   Diagnosis    Mild vitamin D deficiency    Low ferritin level    Hot flashes due to surgical menopause    Overweight (BMI 25.0-29.9)    Hematuria, microscopic    Surgical menopause         Current Outpatient Medications:     cholecalciferol, vitamin D3, (REPLESTA) 1,250 mcg (50,000 unit) Wafr, Take by mouth once a week., Disp: , Rfl:     MAGNESIUM CARBONATE MISC, by Misc.(Non-Drug; Combo Route) route once daily., Disp: , Rfl:     om 3/E/linol/ala/oleic/gla/lip (OMEGA 3-6-9 ORAL), Take by mouth once daily., Disp: , Rfl:     prasterone, DHEA, (DHEA ORAL), Take 5 mg by mouth once daily., Disp: , Rfl:     progesterone (PROMETRIUM) 100 MG capsule, Take 100 mg by mouth once daily., Disp: , Rfl:       Past medical, surgical, family and social histories have " "been reviewed today.      Objective:     Vitals:    03/22/23 1522   BP: 120/79   Pulse: 98   Temp: 98.2 °F (36.8 °C)   SpO2: 98%   Weight: 73.5 kg (162 lb 2.4 oz)   Height: 5' 6" (1.676 m)   PainSc: 0-No pain       Physical Exam  Vitals reviewed.   Constitutional:       General: She is not in acute distress.  Eyes:      Pupils: Pupils are equal, round, and reactive to light.   Cardiovascular:      Rate and Rhythm: Normal rate and regular rhythm.      Pulses: Normal pulses.      Heart sounds: Normal heart sounds.   Pulmonary:      Effort: Pulmonary effort is normal.      Breath sounds: Normal breath sounds.   Abdominal:      General: Bowel sounds are normal. There is no distension.      Palpations: Abdomen is soft.      Tenderness: There is abdominal tenderness (site of pain although no pain currently) in the left lower quadrant. There is no right CVA tenderness, left CVA tenderness, guarding or rebound. Negative signs include Campuzano's sign.      Hernia: No hernia is present.       Musculoskeletal:         General: Normal range of motion.      Cervical back: Normal range of motion and neck supple. No rigidity.      Right lower leg: No edema.      Left lower leg: No edema.   Skin:     Capillary Refill: Capillary refill takes less than 2 seconds.   Neurological:      Mental Status: She is alert and oriented to person, place, and time.      Motor: No weakness.      Gait: Gait normal.   Psychiatric:         Mood and Affect: Mood normal.         Behavior: Behavior normal.         Thought Content: Thought content normal.         Judgment: Judgment normal.       Diagnosis/Assessment:     1. Abdominal pain, chronic, left lower quadrant  CT Abdomen Pelvis With Contrast          Follow-up:     Pending CT.  RTC as directed and/or prn.        SUMA Davison  [facility]       40 minutes of total time spent on the encounter, which includes face to face time and non-face to face time preparing to " see the patient.  This includes obtaining and/or reviewing separately obtained history, performing a medically appropriate examination and/or evaluation, and counseling and educating the patient/family/caregiver.  Includes documenting clinical information in the electronic or other health record, independently interpreting results (not separately reported) and communicating results to the patient/family/caregiver, with care coordination (not separately reported).  Medications, tests and/or procedures ordered as necessary along with referring and communicating with other health professionals (when not separately reported).

## 2023-04-04 ENCOUNTER — TELEPHONE (OUTPATIENT)
Dept: FAMILY MEDICINE | Facility: CLINIC | Age: 53
End: 2023-04-04
Payer: COMMERCIAL

## 2023-04-04 NOTE — TELEPHONE ENCOUNTER
----- Message from Aaron Swift sent at 4/4/2023 12:42 PM CDT -----  TEST WAS DENIED BY INSURANCE, PLEASE REACH OUT TO PATIENT TO DISCUSS OTHER OPTION AND CANCEL APPT,, THANKS  Patient is scheduled for a CT test  and insurance DENIED test, an in basket was sent to providers office for an appeal. Please determine what provider plans on doing and please call patient to get appt cancel and discuss other options if necessary. Any questions please call radiology at ext [phone]

## 2023-04-05 ENCOUNTER — TELEPHONE (OUTPATIENT)
Dept: FAMILY MEDICINE | Facility: CLINIC | Age: 53
End: 2023-04-05
Payer: COMMERCIAL

## 2023-04-05 NOTE — TELEPHONE ENCOUNTER
----- Message from Bess Smith sent at 4/5/2023 10:26 AM CDT -----  Contact: Self  Patient is calling in regarding canceled CT. Please call back at [phone]

## 2023-04-06 NOTE — TELEPHONE ENCOUNTER
----- Message from Melissa Barlow sent at 4/6/2023  3:06 PM CDT -----  Contact: Joycelyn  Type:  Patient Returning Call    Who Called: Joycelyn  Who Left Message for Patient: Nurse  Does the patient know what this is regarding?:  Would the patient rather a call back or a response via [facility]? Call  Best Call Back Number:[phone]  Additional Information:

## 2023-04-06 NOTE — TELEPHONE ENCOUNTER
----- Message from Holli Quinones sent at 4/6/2023  9:08 AM CDT -----  Regarding: pt called  Name of Who is Calling: LOREN OH [[id number]]      What is the request in detail: pt ct scan was cxl due to insurance denying it Please advise       Can the clinic reply by [facility]: No      What Number to Call Back [facility]: [phone]

## 2023-04-06 NOTE — TELEPHONE ENCOUNTER
Pt and  is wanting to know what needs to be done to get CT authorized. I see duy previously sent this message to you already. Pt and  advised that once we get a response someone will call them back

## 2023-04-07 ENCOUNTER — TELEPHONE (OUTPATIENT)
Dept: FAMILY MEDICINE | Facility: CLINIC | Age: 53
End: 2023-04-07
Payer: COMMERCIAL

## 2023-04-07 DIAGNOSIS — G89.29 ABDOMINAL PAIN, CHRONIC, LEFT LOWER QUADRANT: Primary | ICD-10-CM

## 2023-04-07 DIAGNOSIS — R10.32 ABDOMINAL PAIN, CHRONIC, LEFT LOWER QUADRANT: Primary | ICD-10-CM

## 2023-04-07 NOTE — TELEPHONE ENCOUNTER
Her insurance denied the CT that was ordered.  I changed this to a complete abdominal US.  Hopefully this one will go through.

## 2023-04-13 ENCOUNTER — HOSPITAL ENCOUNTER (OUTPATIENT)
Dept: RADIOLOGY | Facility: HOSPITAL | Age: 53
Discharge: HOME | End: 2023-04-13
Attending: REGISTERED NURSE
Payer: COMMERCIAL

## 2023-04-13 DIAGNOSIS — R10.32 ABDOMINAL PAIN, CHRONIC, LEFT LOWER QUADRANT: ICD-10-CM

## 2023-04-13 DIAGNOSIS — G89.29 ABDOMINAL PAIN, CHRONIC, LEFT LOWER QUADRANT: ICD-10-CM

## 2023-04-13 PROCEDURE — 76700 US EXAM ABDOM COMPLETE: CPT | Mod: 26,,, | Performed by: RADIOLOGY

## 2023-04-13 PROCEDURE — 76700 US EXAM ABDOM COMPLETE: CPT | Mod: TC

## 2023-04-13 PROCEDURE — 76700 US ABDOMEN COMPLETE: ICD-10-PCS | Mod: 26,,, | Performed by: RADIOLOGY

## 2023-05-31 ENCOUNTER — TELEPHONE (OUTPATIENT)
Dept: FAMILY MEDICINE | Facility: CLINIC | Age: 53
End: 2023-05-31
Payer: COMMERCIAL

## 2023-05-31 DIAGNOSIS — Z00.00 ANNUAL PHYSICAL EXAM: Primary | ICD-10-CM

## 2023-05-31 NOTE — TELEPHONE ENCOUNTER
----- Message from Shari Sy sent at 5/31/2023 10:05 AM CDT -----  Contact: Joycelyn  Patient is calling to speak with a nurse regarding orders . Reports wanting to have orders put in for blood work for appt on 6/7 . Please give patient a call back at [phone] .

## 2023-06-01 NOTE — TELEPHONE ENCOUNTER
I have put the following orders and/or medications to this note.  Please advise pt and assist.    Orders Placed This Encounter   Procedures    CBC Auto Differential     Standing Status:   Future     Standing Expiration Date:   7/30/2024    TSH     Standing Status:   Future     Standing Expiration Date:   7/30/2024    Comprehensive Metabolic Panel     Standing Status:   Future     Standing Expiration Date:   7/30/2024    Lipid Panel     Standing Status:   Future     Standing Expiration Date:   7/30/2024    Ferritin     Standing Status:   Future     Standing Expiration Date:   7/30/2024    Vitamin D     Standing Status:   Future     Standing Expiration Date:   7/30/2024

## 2023-06-07 ENCOUNTER — LAB VISIT (OUTPATIENT)
Dept: LAB | Facility: HOSPITAL | Age: 53
End: 2023-06-07
Attending: FAMILY MEDICINE
Payer: COMMERCIAL

## 2023-06-07 ENCOUNTER — OFFICE VISIT (OUTPATIENT)
Dept: FAMILY MEDICINE | Facility: CLINIC | Age: 53
End: 2023-06-07
Payer: COMMERCIAL

## 2023-06-07 VITALS
WEIGHT: 159.63 LBS | HEIGHT: 66 IN | DIASTOLIC BLOOD PRESSURE: 82 MMHG | BODY MASS INDEX: 25.66 KG/M2 | RESPIRATION RATE: 18 BRPM | HEART RATE: 78 BPM | OXYGEN SATURATION: 98 % | SYSTOLIC BLOOD PRESSURE: 124 MMHG | TEMPERATURE: 98 F

## 2023-06-07 DIAGNOSIS — E89.40 SURGICAL MENOPAUSE: ICD-10-CM

## 2023-06-07 DIAGNOSIS — Z00.00 ANNUAL PHYSICAL EXAM: ICD-10-CM

## 2023-06-07 DIAGNOSIS — E66.3 OVERWEIGHT (BMI 25.0-29.9): ICD-10-CM

## 2023-06-07 DIAGNOSIS — Z00.00 ANNUAL PHYSICAL EXAM: Primary | ICD-10-CM

## 2023-06-07 LAB
ALBUMIN SERPL BCP-MCNC: 3.7 G/DL (ref 3.5–5.2)
ALP SERPL-CCNC: 49 U/L (ref 55–135)
ALT SERPL W/O P-5'-P-CCNC: 12 U/L (ref 10–44)
ANION GAP SERPL CALC-SCNC: 5 MMOL/L (ref 8–16)
AST SERPL-CCNC: 18 U/L (ref 10–40)
BASOPHILS # BLD AUTO: 0.02 K/UL (ref 0–0.2)
BASOPHILS NFR BLD: 0.5 % (ref 0–1.9)
BILIRUB SERPL-MCNC: 0.4 MG/DL (ref 0.1–1)
BUN SERPL-MCNC: 6 MG/DL (ref 6–20)
CALCIUM SERPL-MCNC: 9.4 MG/DL (ref 8.7–10.5)
CHLORIDE SERPL-SCNC: 104 MMOL/L (ref 95–110)
CHOLEST SERPL-MCNC: 211 MG/DL (ref 120–199)
CHOLEST/HDLC SERPL: 3.6 {RATIO} (ref 2–5)
CO2 SERPL-SCNC: 30 MMOL/L (ref 23–29)
CREAT SERPL-MCNC: 0.8 MG/DL (ref 0.5–1.4)
DIFFERENTIAL METHOD: ABNORMAL
EOSINOPHIL # BLD AUTO: 0.1 K/UL (ref 0–0.5)
EOSINOPHIL NFR BLD: 1.9 % (ref 0–8)
ERYTHROCYTE [DISTWIDTH] IN BLOOD BY AUTOMATED COUNT: 11.7 % (ref 11.5–14.5)
EST. GFR  (NO RACE VARIABLE): >60 ML/MIN/1.73 M^2
ESTIMATED AVG GLUCOSE: 74 MG/DL (ref 68–131)
FERRITIN SERPL-MCNC: 83 NG/ML (ref 20–300)
GLUCOSE SERPL-MCNC: 77 MG/DL (ref 70–110)
HBA1C MFR BLD: 4.2 % (ref 4–5.6)
HCT VFR BLD AUTO: 38.8 % (ref 37–48.5)
HDLC SERPL-MCNC: 59 MG/DL (ref 40–75)
HDLC SERPL: 28 % (ref 20–50)
HGB BLD-MCNC: 12.9 G/DL (ref 12–16)
IMM GRANULOCYTES # BLD AUTO: 0 K/UL (ref 0–0.04)
IMM GRANULOCYTES NFR BLD AUTO: 0 % (ref 0–0.5)
LDLC SERPL CALC-MCNC: 139.8 MG/DL (ref 63–159)
LYMPHOCYTES # BLD AUTO: 1.1 K/UL (ref 1–4.8)
LYMPHOCYTES NFR BLD: 30.9 % (ref 18–48)
MCH RBC QN AUTO: 31.7 PG (ref 27–31)
MCHC RBC AUTO-ENTMCNC: 33.2 G/DL (ref 32–36)
MCV RBC AUTO: 95 FL (ref 82–98)
MONOCYTES # BLD AUTO: 0.2 K/UL (ref 0.3–1)
MONOCYTES NFR BLD: 6.3 % (ref 4–15)
NEUTROPHILS # BLD AUTO: 2.2 K/UL (ref 1.8–7.7)
NEUTROPHILS NFR BLD: 60.4 % (ref 38–73)
NONHDLC SERPL-MCNC: 152 MG/DL
NRBC BLD-RTO: 0 /100 WBC
PLATELET # BLD AUTO: 222 K/UL (ref 150–450)
PMV BLD AUTO: 10.2 FL (ref 9.2–12.9)
POTASSIUM SERPL-SCNC: 4 MMOL/L (ref 3.5–5.1)
PROT SERPL-MCNC: 7.8 G/DL (ref 6–8.4)
RBC # BLD AUTO: 4.07 M/UL (ref 4–5.4)
SODIUM SERPL-SCNC: 139 MMOL/L (ref 136–145)
TRIGL SERPL-MCNC: 61 MG/DL (ref 30–150)
TSH SERPL DL<=0.005 MIU/L-ACNC: 0.81 UIU/ML (ref 0.4–4)
WBC # BLD AUTO: 3.66 K/UL (ref 3.9–12.7)

## 2023-06-07 PROCEDURE — 3074F SYST BP LT 130 MM HG: CPT | Mod: CPTII,S$GLB,, | Performed by: FAMILY MEDICINE

## 2023-06-07 PROCEDURE — 99999 PR PBB SHADOW E&M-EST. PATIENT-LVL IV: CPT | Mod: PBBFAC,,, | Performed by: FAMILY MEDICINE

## 2023-06-07 PROCEDURE — 83036 HEMOGLOBIN GLYCOSYLATED A1C: CPT | Performed by: FAMILY MEDICINE

## 2023-06-07 PROCEDURE — 99396 PREV VISIT EST AGE 40-64: CPT | Mod: S$GLB,,, | Performed by: FAMILY MEDICINE

## 2023-06-07 PROCEDURE — 36415 COLL VENOUS BLD VENIPUNCTURE: CPT | Mod: PO | Performed by: FAMILY MEDICINE

## 2023-06-07 PROCEDURE — 3008F PR BODY MASS INDEX (BMI) DOCUMENTED: ICD-10-PCS | Mod: CPTII,S$GLB,, | Performed by: FAMILY MEDICINE

## 2023-06-07 PROCEDURE — 1159F PR MEDICATION LIST DOCUMENTED IN MEDICAL RECORD: ICD-10-PCS | Mod: CPTII,S$GLB,, | Performed by: FAMILY MEDICINE

## 2023-06-07 PROCEDURE — 1159F MED LIST DOCD IN RCRD: CPT | Mod: CPTII,S$GLB,, | Performed by: FAMILY MEDICINE

## 2023-06-07 PROCEDURE — 80061 LIPID PANEL: CPT | Performed by: FAMILY MEDICINE

## 2023-06-07 PROCEDURE — 85025 COMPLETE CBC W/AUTO DIFF WBC: CPT | Performed by: FAMILY MEDICINE

## 2023-06-07 PROCEDURE — 80053 COMPREHEN METABOLIC PANEL: CPT | Performed by: FAMILY MEDICINE

## 2023-06-07 PROCEDURE — 1160F RVW MEDS BY RX/DR IN RCRD: CPT | Mod: CPTII,S$GLB,, | Performed by: FAMILY MEDICINE

## 2023-06-07 PROCEDURE — 1160F PR REVIEW ALL MEDS BY PRESCRIBER/CLIN PHARMACIST DOCUMENTED: ICD-10-PCS | Mod: CPTII,S$GLB,, | Performed by: FAMILY MEDICINE

## 2023-06-07 PROCEDURE — 3079F PR MOST RECENT DIASTOLIC BLOOD PRESSURE 80-89 MM HG: ICD-10-PCS | Mod: CPTII,S$GLB,, | Performed by: FAMILY MEDICINE

## 2023-06-07 PROCEDURE — 3074F PR MOST RECENT SYSTOLIC BLOOD PRESSURE < 130 MM HG: ICD-10-PCS | Mod: CPTII,S$GLB,, | Performed by: FAMILY MEDICINE

## 2023-06-07 PROCEDURE — 84443 ASSAY THYROID STIM HORMONE: CPT | Performed by: FAMILY MEDICINE

## 2023-06-07 PROCEDURE — 99396 PR PREVENTIVE VISIT,EST,40-64: ICD-10-PCS | Mod: S$GLB,,, | Performed by: FAMILY MEDICINE

## 2023-06-07 PROCEDURE — 3079F DIAST BP 80-89 MM HG: CPT | Mod: CPTII,S$GLB,, | Performed by: FAMILY MEDICINE

## 2023-06-07 PROCEDURE — 99999 PR PBB SHADOW E&M-EST. PATIENT-LVL IV: ICD-10-PCS | Mod: PBBFAC,,, | Performed by: FAMILY MEDICINE

## 2023-06-07 PROCEDURE — 3008F BODY MASS INDEX DOCD: CPT | Mod: CPTII,S$GLB,, | Performed by: FAMILY MEDICINE

## 2023-06-07 PROCEDURE — 82728 ASSAY OF FERRITIN: CPT | Performed by: FAMILY MEDICINE

## 2023-06-07 RX ORDER — THIAMINE HCL 250 MG
250 TABLET ORAL DAILY
COMMUNITY

## 2023-06-07 RX ORDER — LANOLIN ALCOHOL/MO/W.PET/CERES
100 CREAM (GRAM) TOPICAL DAILY
COMMUNITY
End: 2023-06-07

## 2023-06-07 NOTE — PROGRESS NOTES
HISTORY OF PRESENT ILLNESS: Ms. Wesley comes in today fasting and without taking medications for annual wellness examination. She reports no acute problems today.     END OF LIFE DECISION: She does not have a living will and is not sure about life support.    Current Outpatient Medications   Medication Sig    cholecalciferol, vitamin D3, (REPLESTA) 1,250 mcg (50,000 unit) Wafr Take by mouth once a week.    prasterone, DHEA, (DHEA ORAL) Take 5 mg by mouth once daily.    progesterone (PROMETRIUM) 100 MG capsule Take 100 mg by mouth once daily.    thiamine (VITAMIN B-1) 250 MG tablet Take 250 mg by mouth once daily.     SCREENINGS:   Cholesterol: June 7, 2022.  FFS/Colonoscopy: November 9, 2015 - brunilda; repeat in 10 years.   Mammogram: Spring 2022 with GYN Dr. Regina worley per patient.   GYN Exam: March 7, 2022 pap smear with GYN Dr. Regina worley.   Dexa Scan: Never.  Eye Exam: Spring 2023 per patient. She wears reading glasses.  HCVAb: June 7, 2022. She desires.  PPD: Never.    Immunizations: Td/Tdap - April 20, 2015.  Gardasil - N./A.  Zostavax - N./A.  Shingrix - Never. Reports no history of varicella or zoster.  Pneumovax - never.  Seasonal Flu - November 13, 2015. She declines.  Covid-19 vaccine series - Never. She declines.    ROS:  GENERAL: Denies fever, chills, fatigue or unusual weight change. Appetite normal. Weight 73.5 kg (162 lb 0.6 oz) at June 7, 2022 visit. Not exercises or monitors diet.     SKIN: Denies rashes, itching, changes in mole, color or texture of skin or easy bruising.  HEAD: Denies recent head trauma or headache.   EYES: Denies change in vision, pain, diplopia, redness or discharge. She wears readers.  EARS: Denies ear pain, discharge, vertigo or decreased hearing.  NOSE: Denies loss of smell, epistaxis or rhinitis.  MOUTH & THROAT: Denies hoarseness or change in voice. Denies excessive gum bleeding or mouth sores. Denies sore throat.  NODES: Denies swollen  "glands.  CHEST: Denies RUBALCAVA, wheezing, cough, or sputum production.  CARDIOVASCULAR: Denies chest pain, PND, orthopnea or reduced exercise tolerance. Denies palpitations.  ABDOMEN: Denies diarrhea, constipation, nausea, vomiting, abdominal pain, or blood in stool.  Reports has intermittent pain at left upper quadrant for least 8 years and without medication required for pain relief.  02/18/2021 CT abdomen, pelvis with/without contrast - unremarkable.  04/13/2023 abdominal ultrasound - unremarkable.  URINARY: Denies flank pain, dysuria or hematuria. Saw IRINEO Wilder with [facility] on February 18, 2021 for microscopic hematuria with unremarkable workup which included CT urography without cystoscopy.  GENITOURINARY: Denies flank pain, dysuria, frequency or hematuria. Performs monthly breast self exams.   ENDOCRINE: Denies diabetes, thyroid problems except slight cholesterol elevation in September 2019. Reports follows every 4 months with [facility] every 4 months for hormonal therapy and vitamin D replacement.  HEME/LYMPH: Denies bleeding problems.   PERIPHERAL VASCULAR:Denies claudication or cyanosis.  MUSCULOSKELETAL: Denies joint stiffness, pain or swelling. Denies edema.  NEUROLOGIC: Denies history of seizures, tremors, paralysis, alteration of gait or coordination.  PSYCHIATRIC: Denies mood swings, depression, anxiety, homicidal or suicidal thoughts. Denies sleep problems.       PE:   VS: Blood Pressure 124/82 (BP Location: Right arm, Patient Position: Sitting, BP Method: Medium (Manual))   Pulse 78   Temperature 97.6 °F (36.4 °C)   Respiration 18   Height 5' 6" (1.676 m)   Weight 72.4 kg (159 lb 9.8 oz)   Last Menstrual Period 01/23/2019 (Exact Date) Comment: Monthly  Oxygen Saturation 98%   Body Mass Index 25.76 kg/m²   APPEARANCE:  Well nourished, well developed female, pleasant and overweight, alert and oriented in no acute distress.    HEAD: Nontender. Full range of " motion.  EYES: PERRL, conjunctiva pink, lids no edema.   EARS: External canal patent, no swelling or redness. TM's shiny and clear.  NOSE: Mucosa and turbinates pink, not swollen. No discharge. Nontender sinuses.  THROAT: No pharyngeal erythema or exudate. No stridor.   NECK: Supple, no mass, thyroid not enlarged.  NODES: No cervical lymph node enlargement.  CHEST: Normal respiratory effort. Lungs clear to auscultation.  CARDIOVASCULAR: Normal S1, S2. No rubs, murmurs or gallops. PMI not displaced. No carotid bruit. Pedal pulses palpable bilaterally. No edema.  ABDOMEN: Bowel sounds present. Not distended. Soft. No tenderness, masses or organomegaly.  BREAST EXAM: Not examined but deferred to GYN.  PELVIC EXAM: Not examined but deferred to GYN.  RECTAL EXAM: Not examined.  MUSCULOSKELETAL: No joint deformities or stiffness. She is ambulatory without problems.  SKIN: No rashes or suspicious lesions, normal color and turgor.  NEUROLOGIC:   Cranial Nerves: II-XII grossly intact.   DTR's: Knees, Ankles 2+ and equal bilaterally. Gait & Posture: Normal gait and fine motion.  PSYCHIATRIC:Patient alert, oriented x 3. Mood/Affect normal without acute anxiety or depression noted. Judgment/insight good as she makes appropriate decisions during today's exam.      ASSESSMENT:    ICD-10-CM ICD-9-CM    1. Annual physical exam  Z00.00 V70.0 CBC Auto Differential      TSH      Comprehensive Metabolic Panel      Lipid Panel      Hemoglobin A1C      FERRITIN      2. Overweight (BMI 25.0-29.9)  E66.3 278.02       3. Surgical menopause  E89.40 627.4           PLAN:  1. Age-appropriate counseling-appropriate low-sodium, low-cholesterol, low carbohydrate diet and exercise daily, monthly breast self exam, annual wellness examination.   2. Patient advised to call for results.  3. Continue current medications.  4. Keep follow up with specialists.  5. Follow up in about 1 year (around 6/7/2024) for physical.

## 2023-06-13 ENCOUNTER — PATIENT OUTREACH (OUTPATIENT)
Dept: ADMINISTRATIVE | Facility: HOSPITAL | Age: 53
End: 2023-06-13
Payer: COMMERCIAL

## 2023-06-23 NOTE — PROGRESS NOTES
Received duplicate 2021 mammogram. Called patient to f/u on current mammo. Patient is scheduled to completed mammo on 06/27/23. Remind me set to request record

## 2023-11-02 ENCOUNTER — TELEPHONE (OUTPATIENT)
Dept: INTERNAL MEDICINE | Facility: CLINIC | Age: 53
End: 2023-11-02
Payer: COMMERCIAL

## 2023-11-02 NOTE — TELEPHONE ENCOUNTER
----- Message from Pete Cobb sent at 11/2/2023  1:58 PM CDT -----  Contact: [phone]  Patient is requesting a call in regards to her appt scheduled for 11/6. Please call pt back at [phone]. Thanks KB

## 2023-11-02 NOTE — TELEPHONE ENCOUNTER
Returned call to patient , who want to be seen today, did not have anything available. Patient wanted to see if can get in with gastro. Advise patient will need a referral from doctor. Advise patient if get worse can go to Urgent care or ER. Patient has an appointment schedule for Monday

## 2023-11-06 ENCOUNTER — OFFICE VISIT (OUTPATIENT)
Dept: FAMILY MEDICINE | Facility: CLINIC | Age: 53
End: 2023-11-06
Attending: FAMILY MEDICINE
Payer: COMMERCIAL

## 2023-11-06 VITALS
HEART RATE: 84 BPM | WEIGHT: 158.75 LBS | TEMPERATURE: 98 F | SYSTOLIC BLOOD PRESSURE: 126 MMHG | RESPIRATION RATE: 18 BRPM | BODY MASS INDEX: 25.51 KG/M2 | DIASTOLIC BLOOD PRESSURE: 78 MMHG | OXYGEN SATURATION: 97 % | HEIGHT: 66 IN

## 2023-11-06 DIAGNOSIS — R19.8 ABDOMINAL BURNING SENSATION IN LEFT UPPER QUADRANT: Primary | ICD-10-CM

## 2023-11-06 DIAGNOSIS — E66.3 OVERWEIGHT (BMI 25.0-29.9): ICD-10-CM

## 2023-11-06 DIAGNOSIS — I10 PRIMARY HYPERTENSION: ICD-10-CM

## 2023-11-06 PROCEDURE — 3008F PR BODY MASS INDEX (BMI) DOCUMENTED: ICD-10-PCS | Mod: CPTII,S$GLB,, | Performed by: FAMILY MEDICINE

## 2023-11-06 PROCEDURE — 3044F HG A1C LEVEL LT 7.0%: CPT | Mod: CPTII,S$GLB,, | Performed by: FAMILY MEDICINE

## 2023-11-06 PROCEDURE — 99999 PR PBB SHADOW E&M-EST. PATIENT-LVL V: CPT | Mod: PBBFAC,,, | Performed by: FAMILY MEDICINE

## 2023-11-06 PROCEDURE — 3074F PR MOST RECENT SYSTOLIC BLOOD PRESSURE < 130 MM HG: ICD-10-PCS | Mod: CPTII,S$GLB,, | Performed by: FAMILY MEDICINE

## 2023-11-06 PROCEDURE — 3008F BODY MASS INDEX DOCD: CPT | Mod: CPTII,S$GLB,, | Performed by: FAMILY MEDICINE

## 2023-11-06 PROCEDURE — 3044F PR MOST RECENT HEMOGLOBIN A1C LEVEL <7.0%: ICD-10-PCS | Mod: CPTII,S$GLB,, | Performed by: FAMILY MEDICINE

## 2023-11-06 PROCEDURE — 1159F MED LIST DOCD IN RCRD: CPT | Mod: CPTII,S$GLB,, | Performed by: FAMILY MEDICINE

## 2023-11-06 PROCEDURE — 99214 OFFICE O/P EST MOD 30 MIN: CPT | Mod: S$GLB,,, | Performed by: FAMILY MEDICINE

## 2023-11-06 PROCEDURE — 99214 PR OFFICE/OUTPT VISIT, EST, LEVL IV, 30-39 MIN: ICD-10-PCS | Mod: S$GLB,,, | Performed by: FAMILY MEDICINE

## 2023-11-06 PROCEDURE — 3078F PR MOST RECENT DIASTOLIC BLOOD PRESSURE < 80 MM HG: ICD-10-PCS | Mod: CPTII,S$GLB,, | Performed by: FAMILY MEDICINE

## 2023-11-06 PROCEDURE — 99999 PR PBB SHADOW E&M-EST. PATIENT-LVL V: ICD-10-PCS | Mod: PBBFAC,,, | Performed by: FAMILY MEDICINE

## 2023-11-06 PROCEDURE — 3078F DIAST BP <80 MM HG: CPT | Mod: CPTII,S$GLB,, | Performed by: FAMILY MEDICINE

## 2023-11-06 PROCEDURE — 1159F PR MEDICATION LIST DOCUMENTED IN MEDICAL RECORD: ICD-10-PCS | Mod: CPTII,S$GLB,, | Performed by: FAMILY MEDICINE

## 2023-11-06 PROCEDURE — 1160F RVW MEDS BY RX/DR IN RCRD: CPT | Mod: CPTII,S$GLB,, | Performed by: FAMILY MEDICINE

## 2023-11-06 PROCEDURE — 3074F SYST BP LT 130 MM HG: CPT | Mod: CPTII,S$GLB,, | Performed by: FAMILY MEDICINE

## 2023-11-06 PROCEDURE — 1160F PR REVIEW ALL MEDS BY PRESCRIBER/CLIN PHARMACIST DOCUMENTED: ICD-10-PCS | Mod: CPTII,S$GLB,, | Performed by: FAMILY MEDICINE

## 2023-11-06 RX ORDER — PANTOPRAZOLE SODIUM 40 MG/1
40 TABLET, DELAYED RELEASE ORAL
Status: ON HOLD | COMMUNITY
Start: 2023-11-02 | End: 2024-01-31

## 2023-11-06 RX ORDER — AMLODIPINE BESYLATE 2.5 MG/1
2.5 TABLET ORAL EVERY MORNING
COMMUNITY
Start: 2023-11-01 | End: 2023-11-24 | Stop reason: SDUPTHER

## 2023-11-06 NOTE — PROGRESS NOTES
Joycelyn Wesley    Chief Complaint   Patient presents with    Follow-up    Urgent Care        History of Present Illness:   Ms. Wesley comes in today for follow-up of urgent care visits.  She is accompanied by her .      She states she was evaluated at [facility] on October 31, 2023 for headaches, elevated blood pressure (169/101) and burning pain in her left upper quadrant for 2 weeks.  Amlodipine 2.5 mg daily was added for blood pressure management.  She states she has been taking amlodipine without problems and has been performing home blood pressure monitoring twice since starting amlodipine with levels ranging 108-109/69-74.  She asks if she should continue to take amlodipine as she states she has never had elevated blood pressure in the past.    She states she was also evaluated at [facility] on November 2, 2023 for left upper quadrant pain, esophagus burning, elevated blood pressure for few weeks.  She states fingerstick H pylori test was performed and was negative.  She states she was prescribed Protonix 40 mg daily for 30 days.  She asks to see GI.  She states she has been having pain at left quadrant abdomen for some time and has received workup.  She states she had been drinking caffeine daily until 4-5 days ago.  She states she occasionally takes NSAID's for headaches.  She denies late-night eating, spicy food consumption, tobacco, alcohol, or illicit drug use.  She states she rarely eats peppermint.    Otherwise, she denies having fever, chills, fatigue, appetite changes; shortness of breath, cough, wheezing; chest pain, palpitations, leg swelling; nausea, vomiting, diarrhea, constipation; unusual urinary symptoms; polydipsia, polyphagia, polyuria, hot or cold intolerance; back pain; numbness, headache; anxiety, depression, homicidal or suicidal thoughts.               Current Outpatient Medications   Medication Sig    amLODIPine (NORVASC) 2.5 MG tablet Take 2.5 mg by mouth  every morning.    cholecalciferol, vitamin D3, (REPLESTA) 1,250 mcg (50,000 unit) Wa Take by mouth once a week.    pantoprazole (PROTONIX) 40 MG tablet Take 40 mg by mouth.    prasterone, DHEA, (DHEA ORAL) Take 5 mg by mouth once daily.    progesterone (PROMETRIUM) 100 MG capsule Take 100 mg by mouth once daily.    thiamine (VITAMIN B-1) 250 MG tablet Take 250 mg by mouth once daily.         Review of Systems   Constitutional:  Negative for activity change, appetite change, chills, fatigue and fever.   Respiratory:  Negative for cough, shortness of breath and wheezing.    Cardiovascular:  Negative for chest pain, palpitations and leg swelling.   Gastrointestinal:  Positive for abdominal pain. Negative for constipation, diarrhea, nausea and vomiting.        See history of present illness.   Endocrine: Negative for cold intolerance, heat intolerance, polydipsia, polyphagia and polyuria.   Genitourinary:  Negative for difficulty urinating.   Musculoskeletal:  Negative for back pain.   Neurological:  Negative for numbness and headaches.   Psychiatric/Behavioral:  Negative for dysphoric mood and suicidal ideas. The patient is not nervous/anxious.         Negative for homicidal ideas.       Objective:  Physical Exam  Vitals reviewed.   Constitutional:       General: She is not in acute distress.     Appearance: Normal appearance. She is not ill-appearing, toxic-appearing or diaphoretic.      Comments: Pleasant.   HENT:      Mouth/Throat:      Mouth: Mucous membranes are moist.      Pharynx: Oropharynx is clear. No oropharyngeal exudate or posterior oropharyngeal erythema.   Cardiovascular:      Rate and Rhythm: Normal rate and regular rhythm.      Pulses: Normal pulses.      Heart sounds: No murmur heard.  Pulmonary:      Effort: Pulmonary effort is normal. No respiratory distress.      Breath sounds: Normal breath sounds. No wheezing.   Abdominal:      General: Bowel sounds are normal. There is no distension.       Palpations: Abdomen is soft. There is no mass.      Tenderness: There is no abdominal tenderness. There is no right CVA tenderness, left CVA tenderness, guarding or rebound.   Musculoskeletal:         General: No swelling or tenderness. Normal range of motion.      Cervical back: Normal range of motion and neck supple. No tenderness.      Comments: She is ambulatory without problems.   Lymphadenopathy:      Cervical: No cervical adenopathy.   Skin:     General: Skin is warm.   Neurological:      General: No focal deficit present.      Mental Status: She is alert and oriented to person, place, and time.   Psychiatric:         Mood and Affect: Mood normal.         Behavior: Behavior normal.         Thought Content: Thought content normal.         Judgment: Judgment normal.         ASSESSMENT:  1. Abdominal burning sensation in left upper quadrant    2. Primary hypertension    3. Overweight (BMI 25.0-29.9)        PLAN:  Joycelyn was seen today for follow-up and urgent care .    Diagnoses and all orders for this visit:    Abdominal burning sensation in left upper quadrant  -     Ambulatory referral/consult to Gastroenterology; Future    Primary hypertension    Overweight (BMI 25.0-29.9)    Continue current medications, follow low sodium, low cholesterol, low carb diet, daily walks.  Encouraged patient to keep blood pressure readings for several weeks to make better decision about stopping or continuing BP medication.  GERD lifestyle changes discussed/advised.  Keep follow up with specialists.  Flu shot this fall.  Follow up if symptoms worsen or fail to improve.

## 2023-11-13 ENCOUNTER — HOSPITAL ENCOUNTER (OUTPATIENT)
Dept: PREADMISSION TESTING | Facility: HOSPITAL | Age: 53
Discharge: HOME | End: 2023-11-13
Attending: INTERNAL MEDICINE
Payer: COMMERCIAL

## 2023-11-13 ENCOUNTER — PATIENT MESSAGE (OUTPATIENT)
Dept: GASTROENTEROLOGY | Facility: CLINIC | Age: 53
End: 2023-11-13

## 2023-11-13 ENCOUNTER — OFFICE VISIT (OUTPATIENT)
Dept: GASTROENTEROLOGY | Facility: CLINIC | Age: 53
End: 2023-11-13
Attending: FAMILY MEDICINE
Payer: COMMERCIAL

## 2023-11-13 VITALS
BODY MASS INDEX: 25.58 KG/M2 | SYSTOLIC BLOOD PRESSURE: 130 MMHG | DIASTOLIC BLOOD PRESSURE: 87 MMHG | WEIGHT: 159.19 LBS | HEART RATE: 76 BPM | HEIGHT: 66 IN

## 2023-11-13 DIAGNOSIS — K21.9 GASTROESOPHAGEAL REFLUX DISEASE WITHOUT ESOPHAGITIS: Primary | ICD-10-CM

## 2023-11-13 DIAGNOSIS — R20.8 SUPRAPUBIC ABDOMINAL BURNING SENSATION: Primary | ICD-10-CM

## 2023-11-13 DIAGNOSIS — R20.8 SUPRAPUBIC ABDOMINAL BURNING SENSATION: ICD-10-CM

## 2023-11-13 DIAGNOSIS — Z86.19 HISTORY OF HELICOBACTER PYLORI INFECTION: ICD-10-CM

## 2023-11-13 DIAGNOSIS — R10.30 LOWER ABDOMINAL PAIN: ICD-10-CM

## 2023-11-13 DIAGNOSIS — K21.9 GASTROESOPHAGEAL REFLUX DISEASE WITHOUT ESOPHAGITIS: ICD-10-CM

## 2023-11-13 PROCEDURE — 1160F PR REVIEW ALL MEDS BY PRESCRIBER/CLIN PHARMACIST DOCUMENTED: ICD-10-PCS | Mod: CPTII,S$GLB,, | Performed by: INTERNAL MEDICINE

## 2023-11-13 PROCEDURE — 99204 OFFICE O/P NEW MOD 45 MIN: CPT | Mod: S$GLB,,, | Performed by: INTERNAL MEDICINE

## 2023-11-13 PROCEDURE — 3079F PR MOST RECENT DIASTOLIC BLOOD PRESSURE 80-89 MM HG: ICD-10-PCS | Mod: CPTII,S$GLB,, | Performed by: INTERNAL MEDICINE

## 2023-11-13 PROCEDURE — 3008F PR BODY MASS INDEX (BMI) DOCUMENTED: ICD-10-PCS | Mod: CPTII,S$GLB,, | Performed by: INTERNAL MEDICINE

## 2023-11-13 PROCEDURE — 3079F DIAST BP 80-89 MM HG: CPT | Mod: CPTII,S$GLB,, | Performed by: INTERNAL MEDICINE

## 2023-11-13 PROCEDURE — 1159F MED LIST DOCD IN RCRD: CPT | Mod: CPTII,S$GLB,, | Performed by: INTERNAL MEDICINE

## 2023-11-13 PROCEDURE — 3044F PR MOST RECENT HEMOGLOBIN A1C LEVEL <7.0%: ICD-10-PCS | Mod: CPTII,S$GLB,, | Performed by: INTERNAL MEDICINE

## 2023-11-13 PROCEDURE — 3075F SYST BP GE 130 - 139MM HG: CPT | Mod: CPTII,S$GLB,, | Performed by: INTERNAL MEDICINE

## 2023-11-13 PROCEDURE — 99999 PR PBB SHADOW E&M-EST. PATIENT-LVL V: CPT | Mod: PBBFAC,,, | Performed by: INTERNAL MEDICINE

## 2023-11-13 PROCEDURE — 1159F PR MEDICATION LIST DOCUMENTED IN MEDICAL RECORD: ICD-10-PCS | Mod: CPTII,S$GLB,, | Performed by: INTERNAL MEDICINE

## 2023-11-13 PROCEDURE — 99204 PR OFFICE/OUTPT VISIT, NEW, LEVL IV, 45-59 MIN: ICD-10-PCS | Mod: S$GLB,,, | Performed by: INTERNAL MEDICINE

## 2023-11-13 PROCEDURE — 3075F PR MOST RECENT SYSTOLIC BLOOD PRESS GE 130-139MM HG: ICD-10-PCS | Mod: CPTII,S$GLB,, | Performed by: INTERNAL MEDICINE

## 2023-11-13 PROCEDURE — 3008F BODY MASS INDEX DOCD: CPT | Mod: CPTII,S$GLB,, | Performed by: INTERNAL MEDICINE

## 2023-11-13 PROCEDURE — 3044F HG A1C LEVEL LT 7.0%: CPT | Mod: CPTII,S$GLB,, | Performed by: INTERNAL MEDICINE

## 2023-11-13 PROCEDURE — 99999 PR PBB SHADOW E&M-EST. PATIENT-LVL V: ICD-10-PCS | Mod: PBBFAC,,, | Performed by: INTERNAL MEDICINE

## 2023-11-13 PROCEDURE — 1160F RVW MEDS BY RX/DR IN RCRD: CPT | Mod: CPTII,S$GLB,, | Performed by: INTERNAL MEDICINE

## 2023-11-13 NOTE — PROGRESS NOTES
[facility]  Gastroenterology    Patient evaluated at the request of Jenn Womack MD  [address]  [place] [address]    PCP: Jenn Womack MD    23    HPI       Abdominal Pain     Additional comments: Pain on left side of abdomen. Burning pain.              Gastroesophageal Reflux     Additional comments: Burning sensation up and down esophagus           Last edited by Skylar Dukes MA on 2023  8:22 AM.          Subjective:     Abdominal Pain: Patient complains of abdominal pain. The pain is located in the periumbilical area and in the LLQ. The pain is described as burning, and is 8/10 in intensity. Onset was 2 weeks ago. Symptoms have been unchanged since. Aggravating factors include none.  Alleviating factors include none. Associated symptoms include belching and heartburn and indigestion. The patient denies anorexia, chills, fever, hematochezia, melena, nausea, and vomiting.   Mrs. Wesley had a normal colonoscopy in ; she tested + for H. Pylori last year and was treated for 14 days. She went to  recently and had a negative H. Pylori IgG test. Her pain happens mostly at night.     Past Medical History:   Diagnosis Date    Fibroids     Mild vitamin D deficiency 2015    Panic attacks     Surgical menopause        Past Surgical History:   Procedure Laterality Date     SECTION, LOW TRANSVERSE      x 2    COLONOSCOPY N/A 2015    Procedure: COLONOSCOPY;  Surgeon: Salty Costello MD;  Location: [facility];  Service: Endoscopy;  Laterality: N/A;    HYSTERECTOMY  04/15/2019    Fibroids    TOTAL ABDOMINAL HYSTERECTOMY W/ BILATERAL SALPINGOOPHORECTOMY  04/15/2019    Due to fibroids    TOTAL ABDOMINAL HYSTERECTOMY W/ BILATERAL SALPINGOOPHORECTOMY  2019    fibroids    TUBAL LIGATION         Current Outpatient Medications on File Prior to Visit   Medication Sig Dispense Refill    amLODIPine (NORVASC) 2.5 MG tablet Take 2.5 mg by mouth every morning.       cholecalciferol, vitamin D3, (REPLESTA) 1,250 mcg (50,000 unit) Wafr Take by mouth once a week.      pantoprazole (PROTONIX) 40 MG tablet Take 40 mg by mouth.      prasterone, DHEA, (DHEA ORAL) Take 5 mg by mouth once daily.      progesterone (PROMETRIUM) 100 MG capsule Take 100 mg by mouth once daily.      thiamine (VITAMIN B-1) 250 MG tablet Take 250 mg by mouth once daily.       No current facility-administered medications on file prior to visit.       Review of patient's allergies indicates:  No Known Allergies    Social History     Socioeconomic History    Marital status:     Number of children: 3   Occupational History    Occupation: Homemaker   Tobacco Use    Smoking status: Never    Smokeless tobacco: Never   Substance and Sexual Activity    Alcohol use: No     Alcohol/week: 0.0 standard drinks of alcohol    Drug use: No    Sexual activity: Yes     Partners: Male     Birth control/protection: Surgical   Social History Narrative    She wears seatbelt.     Social Determinants of Health     Physical Activity: Inactive (6/7/2023)    Exercise Vital Sign     Days of Exercise per Week: 0 days     Minutes of Exercise per Session: 0 min   Stress: No Stress Concern Present (9/13/2019)    [employer] - Occupational Stress Questionnaire     Feeling of Stress : Not at all   Social Connections: Moderately Integrated (9/13/2019)    Social Connection and Isolation Panel [NHANES]     Frequency of Communication with Friends and Family: Twice a week     Frequency of Social Gatherings with Friends and Family: Once a week     Attends Faith Services: More than 4 times per year     Active Member of Clubs or Organizations: No     Attends Club or Organization Meetings: Never     Marital Status:        Family History   Problem Relation Age of Onset    Cancer Mother         Breast cancer    Breast cancer Mother     Emphysema Father     Lung cancer Father     Panic disorder Brother      Cancer Maternal Aunt         Breast cancer    Breast cancer Maternal Aunt     Cancer Maternal Grandmother         Colon cancer    Colon cancer Maternal Grandmother     Cancer Maternal Aunt         Breast cancer    No Known Problems Daughter     No Known Problems Son     No Known Problems Son     No Known Problems Sister     Heart disease Neg Hx     Diabetes Neg Hx     Stroke Neg Hx     Hypertension Neg Hx        Objective:   Vitals:   Vitals:    11/13/23 0818   BP: 130/87   Pulse: 76   Body mass index is 25.69 kg/m².      Physical Exam  Constitutional:       Appearance: She is well-developed.   HENT:      Head: Normocephalic and atraumatic.   Eyes:      Pupils: Pupils are equal, round, and reactive to light.   Neck:      Thyroid: No thyromegaly.   Cardiovascular:      Rate and Rhythm: Normal rate and regular rhythm.      Heart sounds: Normal heart sounds. No murmur heard.  Pulmonary:      Effort: Pulmonary effort is normal. No respiratory distress.      Breath sounds: Normal breath sounds. No wheezing or rales.   Abdominal:      General: Bowel sounds are normal. There is no distension.      Palpations: Abdomen is soft. There is no mass.      Tenderness: There is abdominal tenderness.   Musculoskeletal:         General: No tenderness. Normal range of motion.      Cervical back: Normal range of motion and neck supple.   Lymphadenopathy:      Cervical: No cervical adenopathy.   Skin:     General: Skin is warm and dry.      Findings: No erythema.   Neurological:      Mental Status: She is alert and oriented to person, place, and time.      Cranial Nerves: No cranial nerve deficit.      Deep Tendon Reflexes: Reflexes are normal and symmetric.   Psychiatric:         Behavior: Behavior normal.         IMPRESSION     Problem List Items Addressed This Visit    None  Visit Diagnoses       Gastroesophageal reflux disease without esophagitis    -  Primary    Relevant Orders    Ambulatory referral/consult to Endo Procedure      Suprapubic abdominal burning sensation        Relevant Orders    Ambulatory referral/consult to Endo Procedure     CBC Auto Differential    Basic Metabolic Panel    Hepatic Function Panel    History of Helicobacter pylori infection        Relevant Orders    H. pylori antigen, stool    Lower abdominal pain        Relevant Orders    CT Abdomen Pelvis With IV Contrast            PLANS:    Gastroesophageal reflux disease without esophagitis  -     Ambulatory referral/consult to Endo Procedure ; Future; Expected date: 11/14/2023    Suprapubic abdominal burning sensation  -     Ambulatory referral/consult to Gastroenterology  -     Ambulatory referral/consult to Endo Procedure ; Future; Expected date: 11/14/2023  -     CBC Auto Differential; Future; Expected date: 11/13/2023  -     Basic Metabolic Panel; Future; Expected date: 11/13/2023  -     Hepatic Function Panel; Future; Expected date: 11/13/2023    History of Helicobacter pylori infection  -     H. pylori antigen, stool; Future; Expected date: 11/13/2023    Lower abdominal pain  -     CT Abdomen Pelvis With IV Contrast; Future; Expected date: 11/13/2023    A high fiber diet with plenty of fluids (up to 8 glasses of water daily) is suggested to relieve these symptoms. Benefiber, 1 tablespoon once or twice daily can be used to keep bowels regular if needed.    Risks, benefits and alternative options were discussed with the patient. Risks including but not limited to infection, bleeding, heart or respiratory problems and perforation that may require surgery were all explained to the patient. The patient had a chance for questions if there were doubts or concerns about the test. The referring provider will be notified that our consultation is complete and available through the patient's records.    Thanks for letting us participate in the care of this nice patient,      Salty Costello

## 2023-11-13 NOTE — PATIENT INSTRUCTIONS
11/13/2023    Dear Joycelyn Wesley,    We are writing to express our heartfelt gratitude for choosing us as your healthcare provider and entrusting us with your well-being. Your health and satisfaction are our top priorities, and we are truly honored to have the opportunity to serve you.    At [facility], we strive to provide the best possible care and support for our patients. My assessment and recommendations are as follows:    Gastroesophageal reflux disease without esophagitis  -     Ambulatory referral/consult to Endo Procedure ; Future; Expected date: 11/14/2023    Suprapubic abdominal burning sensation  -     Ambulatory referral/consult to Gastroenterology  -     Ambulatory referral/consult to Endo Procedure ; Future; Expected date: 11/14/2023  -     CBC Auto Differential; Future; Expected date: 11/13/2023  -     Basic Metabolic Panel; Future; Expected date: 11/13/2023  -     Hepatic Function Panel; Future; Expected date: 11/13/2023    History of Helicobacter pylori infection  -     H. pylori antigen, stool; Future; Expected date: 11/13/2023    Lower abdominal pain  -     CT Abdomen Pelvis With IV Contrast; Future; Expected date: 11/13/2023    We genuinely value your feedback and believe that it plays a crucial role in our pursuit of excellence. We kindly request you to take a few minutes of your time to share your experience with us by posting a Google review. Your honest thoughts and opinions can make a significant difference for others seeking healthcare services and will help us better understand how we can further enhance our patient care.    Your kind words and positive reviews will not only motivate our dedicated team but will also inspire confidence in potential patients who are looking for exceptional healthcare services.    Once again, we extend our sincere appreciation for giving us the opportunity to serve you. If there is anything else we can do to improve your  "experience or assist you further, please do not hesitate to reach out to us.    Thank you for being part of our [facility] family, and we look forward to continuing to provide you with the best care possible.    Thanks for trusting us with your healthcare needs and using [facility]. If you want to ask us a question, you can do so by replying to this message or by calling [phone].    Sincerely,    Salty Costello M.D.    PS: At [facility] we offer virtual visits. Please use your [facility] to schedule a virtual visit.     To rate your experience with Dr. Costello please click on the link below:    [email or web address]    To post a review, simply follow these quick steps:  1. Visit [employer] or search for my name on Google.  2. Click on the "Write a review" button on the left-hand side of the page.  3. Rate your experience by choosing the number of stars that reflect your satisfaction.  4. Share your thoughts and insights about your visit - we'd love to hear your feedback!    "

## 2023-11-14 ENCOUNTER — LAB VISIT (OUTPATIENT)
Dept: LAB | Facility: HOSPITAL | Age: 53
End: 2023-11-14
Attending: INTERNAL MEDICINE
Payer: COMMERCIAL

## 2023-11-14 DIAGNOSIS — Z86.19 HISTORY OF HELICOBACTER PYLORI INFECTION: ICD-10-CM

## 2023-11-14 PROCEDURE — 87338 HPYLORI STOOL AG IA: CPT | Performed by: INTERNAL MEDICINE

## 2023-11-14 RX ORDER — SODIUM, POTASSIUM,MAG SULFATES 17.5-3.13G
1 SOLUTION, RECONSTITUTED, ORAL ORAL DAILY
Qty: 1 KIT | Refills: 0 | Status: SHIPPED | OUTPATIENT
Start: 2023-11-14 | End: 2023-11-16

## 2023-11-21 LAB — H PYLORI AG STL QL IA: NOT DETECTED

## 2023-11-21 NOTE — PROGRESS NOTES
Mrs. Wesley,     H. Pylori was not detected in the stool sample you provided.     Thanks and Happy Thanksgiving!    Salty Costello

## 2023-11-22 ENCOUNTER — HOSPITAL ENCOUNTER (OUTPATIENT)
Dept: RADIOLOGY | Facility: HOSPITAL | Age: 53
Discharge: HOME | End: 2023-11-22
Attending: INTERNAL MEDICINE
Payer: COMMERCIAL

## 2023-11-22 ENCOUNTER — TELEPHONE (OUTPATIENT)
Dept: PREADMISSION TESTING | Facility: HOSPITAL | Age: 53
End: 2023-11-22
Payer: COMMERCIAL

## 2023-11-22 DIAGNOSIS — R10.30 LOWER ABDOMINAL PAIN: ICD-10-CM

## 2023-11-22 PROCEDURE — 74177 CT ABD & PELVIS W/CONTRAST: CPT | Mod: 26,,, | Performed by: RADIOLOGY

## 2023-11-22 PROCEDURE — 74177 CT ABD & PELVIS W/CONTRAST: CPT | Mod: TC

## 2023-11-22 PROCEDURE — A9698 NON-RAD CONTRAST MATERIALNOC: HCPCS | Performed by: INTERNAL MEDICINE

## 2023-11-22 PROCEDURE — 25500020 PHARM REV CODE 255: Performed by: INTERNAL MEDICINE

## 2023-11-22 PROCEDURE — 74177 CT ABDOMEN PELVIS WITH IV CONTRAST: ICD-10-PCS | Mod: 26,,, | Performed by: RADIOLOGY

## 2023-11-22 RX ADMIN — IOHEXOL 100 ML: 350 INJECTION, SOLUTION INTRAVENOUS at 02:11

## 2023-11-22 RX ADMIN — IOHEXOL 1000 ML: 9 SOLUTION ORAL at 01:11

## 2023-11-22 NOTE — TELEPHONE ENCOUNTER
Spoke with pt and her , pt was denied by insurance and they stated insurance company was suppose to send something to doctor for her to get done prior to doing procedure.  They will call doctor and try to find out what needs to be done for pt to have procedure.

## 2023-11-22 NOTE — TELEPHONE ENCOUNTER
----- Message from Diana Covarrubias MA sent at 11/22/2023  2:12 PM CST -----  Contact: [phone]    ----- Message -----  From: Fly Enriquez  Sent: 11/22/2023   1:56 PM CST  To: Pravin MARCUS Staff    Pt is calling regarding colonoscopy . Please call back [phone] . Thanksdj

## 2023-11-24 DIAGNOSIS — I10 PRIMARY HYPERTENSION: Primary | ICD-10-CM

## 2023-11-24 RX ORDER — AMLODIPINE BESYLATE 2.5 MG/1
2.5 TABLET ORAL EVERY MORNING
Qty: 90 TABLET | Refills: 2 | Status: ON HOLD | OUTPATIENT
Start: 2023-11-24 | End: 2024-01-31 | Stop reason: HOSPADM

## 2023-11-24 NOTE — TELEPHONE ENCOUNTER
No care due was identified.  [facility] Embedded Care Due Messages. Reference number: [id number].   11/24/2023 10:28:24 AM CST

## 2023-11-29 ENCOUNTER — PATIENT MESSAGE (OUTPATIENT)
Dept: GASTROENTEROLOGY | Facility: CLINIC | Age: 53
End: 2023-11-29
Payer: COMMERCIAL

## 2023-11-29 ENCOUNTER — TELEPHONE (OUTPATIENT)
Dept: GASTROENTEROLOGY | Facility: CLINIC | Age: 53
End: 2023-11-29
Payer: COMMERCIAL

## 2023-11-29 NOTE — TELEPHONE ENCOUNTER
----- Message from Nathalie Enriquez sent at 11/29/2023  4:17 PM CST -----  Contact: Garo/spouse  Garo is needing a call back in regards to confirming that a fax was received from [email or web address]. Please give him a call back at [phone]

## 2023-11-29 NOTE — TELEPHONE ENCOUNTER
----- Message from Maggi Tipton sent at 11/29/2023 10:01 AM CST -----  Contact:  [phone]  Patients  called in requesting a call back to discuss his wife's endo and plan of care. Please call back [phone]. Thanks ITW

## 2023-11-29 NOTE — TELEPHONE ENCOUNTER
Returned call to pts , Garo. He states pt is still in pain and her insurance has denied her colonoscopy. States the insurance company said they sent to [facility] the process of illumination prior to approving the colonoscopy. Advised them that I have not received anything from [employer] insurance for her.   Asked them to have them fax the list to our office, fax number given to pt and her . Then I can present it to Dr Costello. They agreed to plan and verbalized understanding.

## 2023-12-01 ENCOUNTER — TELEPHONE (OUTPATIENT)
Dept: GASTROENTEROLOGY | Facility: CLINIC | Age: 53
End: 2023-12-01
Payer: COMMERCIAL

## 2023-12-01 NOTE — TELEPHONE ENCOUNTER
Returned pts call. She states the insurance company faxed our office a letter with recommendations for additional or alternate testing since the colonoscopy is not covered.   Advised her that I am not at the [place] location today however I will get the fax on Monday and give it to Dr Costello to review. I will be in touch with her as soon as he gives his recommendations.she agreed to plan.

## 2023-12-01 NOTE — TELEPHONE ENCOUNTER
----- Message from Chioma Robertson MA sent at 12/1/2023  1:41 PM CST -----  Name of Who is Calling:LOREN [place] [[id number]]                What is the request in detail: Pt is requesting a call back to discuss a endoscopy procedure she is suppose to have. Please assist.                Can the clinic reply by [facility]: No                What Number to Call Back if not in [facility]: [phone]

## 2023-12-04 ENCOUNTER — TELEPHONE (OUTPATIENT)
Dept: GASTROENTEROLOGY | Facility: CLINIC | Age: 53
End: 2023-12-04
Payer: COMMERCIAL

## 2023-12-04 NOTE — TELEPHONE ENCOUNTER
Returned call and advised them that we do not have any paperwork from the insurance company for them. Advised them to call the insurance company to refax it to our office. She agreed to plan and verbalized understanding.

## 2023-12-04 NOTE — TELEPHONE ENCOUNTER
----- Message from Kristan Davis LPN sent at 12/1/2023  4:26 PM CST -----  Ck on fax from pts insurance about next steps. Give to Dr Costello to review since her colonoscopy was not approved.     ----- Message -----  From: Chioma Robertson MA  Sent: 12/1/2023   1:42 PM CST  To: Pravin MARCUS Staff    Name of Who is Calling:LOREN [place] [[id number]]                What is the request in detail: Pt is requesting a call back to discuss a endoscopy procedure she is suppose to have. Please assist.                Can the clinic reply by [facility]: No                What Number to Call Back if not in [facility]: [phone]

## 2023-12-06 ENCOUNTER — PATIENT MESSAGE (OUTPATIENT)
Dept: GASTROENTEROLOGY | Facility: CLINIC | Age: 53
End: 2023-12-06
Payer: COMMERCIAL

## 2023-12-06 ENCOUNTER — TELEPHONE (OUTPATIENT)
Dept: GASTROENTEROLOGY | Facility: CLINIC | Age: 53
End: 2023-12-06
Payer: COMMERCIAL

## 2023-12-06 NOTE — TELEPHONE ENCOUNTER
----- Message from Mary Vergara sent at 12/6/2023  4:04 PM CST -----  Contact: [phone]    Patient is returning a phone call.-Joycelyn Wesley-     Who left a message for the patient: -Kristan KNOWLES-     Does patient know what this is regarding: -Returning the nurse call-     Would you like a call back, or a response through your [facility] portal?:- Call back-     Comments: Please call the patient back to advise.

## 2023-12-20 ENCOUNTER — PATIENT MESSAGE (OUTPATIENT)
Dept: GASTROENTEROLOGY | Facility: CLINIC | Age: 53
End: 2023-12-20
Payer: COMMERCIAL

## 2023-12-20 ENCOUNTER — TELEPHONE (OUTPATIENT)
Dept: GASTROENTEROLOGY | Facility: CLINIC | Age: 53
End: 2023-12-20
Payer: COMMERCIAL

## 2023-12-20 NOTE — TELEPHONE ENCOUNTER
Mailed appeal to for EGD to:    [facility] ATTN: Appeals Dept  [address] [place] [address]    Appeal letter and Dr Costello's letter as [facility] insurance requested.   Scanned to pts chart under media. Sent a msg to pt on my chart of recommendations and appeal.

## 2023-12-21 ENCOUNTER — PATIENT MESSAGE (OUTPATIENT)
Dept: PREADMISSION TESTING | Facility: HOSPITAL | Age: 53
End: 2023-12-21
Payer: COMMERCIAL

## 2023-12-21 ENCOUNTER — PATIENT MESSAGE (OUTPATIENT)
Dept: GASTROENTEROLOGY | Facility: CLINIC | Age: 53
End: 2023-12-21
Payer: COMMERCIAL

## 2023-12-21 ENCOUNTER — TELEPHONE (OUTPATIENT)
Dept: PREADMISSION TESTING | Facility: HOSPITAL | Age: 53
End: 2023-12-21
Payer: COMMERCIAL

## 2023-12-22 ENCOUNTER — HOSPITAL ENCOUNTER (OUTPATIENT)
Dept: PREADMISSION TESTING | Facility: HOSPITAL | Age: 53
Discharge: HOME | End: 2023-12-22
Attending: COLON & RECTAL SURGERY
Payer: COMMERCIAL

## 2023-12-22 DIAGNOSIS — R20.8 SUPRAPUBIC ABDOMINAL BURNING SENSATION: Primary | ICD-10-CM

## 2023-12-22 DIAGNOSIS — K21.9 GASTROESOPHAGEAL REFLUX DISEASE WITHOUT ESOPHAGITIS: ICD-10-CM

## 2024-01-17 ENCOUNTER — TELEPHONE (OUTPATIENT)
Dept: FAMILY MEDICINE | Facility: CLINIC | Age: 54
End: 2024-01-17
Payer: COMMERCIAL

## 2024-01-17 NOTE — TELEPHONE ENCOUNTER
Patient Stated  She went to urgent care about a month and she stated she was put on panpoprazole 40 mg she also stated she wanted to get a refill.

## 2024-01-17 NOTE — TELEPHONE ENCOUNTER
----- Message from Fly Enriquez sent at 1/17/2024  3:41 PM CST -----  Contact: [phone]  Pt is f/u on message that was sent regarding medication refill , panpoprazole 40 mg . Please call back at [phone] . Thanksdj             [email or web address]  [address], [place] [address]

## 2024-01-17 NOTE — TELEPHONE ENCOUNTER
----- Message from Ruba Malagon sent at 1/17/2024 10:31 AM CST -----  Patient is requesting a call back to speak with the nurse. Call back at [phone]

## 2024-01-18 ENCOUNTER — TELEPHONE (OUTPATIENT)
Dept: FAMILY MEDICINE | Facility: CLINIC | Age: 54
End: 2024-01-18
Payer: COMMERCIAL

## 2024-01-18 NOTE — TELEPHONE ENCOUNTER
Patient stated She went to [facility] to Receive Medicine. Patient stated they told her their wasn't a refill sent. Patient Also stated she down to one left and she needs a refill.

## 2024-01-30 PROBLEM — K21.9 GASTROESOPHAGEAL REFLUX DISEASE WITHOUT ESOPHAGITIS: Status: ACTIVE | Noted: 2024-01-30

## 2024-01-31 ENCOUNTER — ANESTHESIA (OUTPATIENT)
Dept: ENDOSCOPY | Facility: HOSPITAL | Age: 54
End: 2024-01-31
Payer: COMMERCIAL

## 2024-01-31 ENCOUNTER — HOSPITAL ENCOUNTER (OUTPATIENT)
Facility: HOSPITAL | Age: 54
Discharge: HOME | End: 2024-01-31
Attending: INTERNAL MEDICINE | Admitting: INTERNAL MEDICINE
Payer: COMMERCIAL

## 2024-01-31 ENCOUNTER — ANESTHESIA EVENT (OUTPATIENT)
Dept: ENDOSCOPY | Facility: HOSPITAL | Age: 54
End: 2024-01-31
Payer: COMMERCIAL

## 2024-01-31 DIAGNOSIS — K21.9 GASTROESOPHAGEAL REFLUX DISEASE WITHOUT ESOPHAGITIS: Primary | ICD-10-CM

## 2024-01-31 DIAGNOSIS — R10.32 LLQ PAIN: ICD-10-CM

## 2024-01-31 PROCEDURE — 25000003 PHARM REV CODE 250: Performed by: NURSE ANESTHETIST, CERTIFIED REGISTERED

## 2024-01-31 PROCEDURE — 63600175 PHARM REV CODE 636 W HCPCS: Performed by: NURSE ANESTHETIST, CERTIFIED REGISTERED

## 2024-01-31 PROCEDURE — 43239 EGD BIOPSY SINGLE/MULTIPLE: CPT | Performed by: INTERNAL MEDICINE

## 2024-01-31 PROCEDURE — 37000008 HC ANESTHESIA 1ST 15 MINUTES: Performed by: INTERNAL MEDICINE

## 2024-01-31 PROCEDURE — 43239 EGD BIOPSY SINGLE/MULTIPLE: CPT | Mod: 51,,, | Performed by: INTERNAL MEDICINE

## 2024-01-31 PROCEDURE — 45378 DIAGNOSTIC COLONOSCOPY: CPT | Performed by: INTERNAL MEDICINE

## 2024-01-31 PROCEDURE — 37000009 HC ANESTHESIA EA ADD 15 MINS: Performed by: INTERNAL MEDICINE

## 2024-01-31 PROCEDURE — 88305 TISSUE EXAM BY PATHOLOGIST: CPT | Performed by: STUDENT IN AN ORGANIZED HEALTH CARE EDUCATION/TRAINING PROGRAM

## 2024-01-31 PROCEDURE — 88305 TISSUE EXAM BY PATHOLOGIST: CPT | Mod: 26,,, | Performed by: STUDENT IN AN ORGANIZED HEALTH CARE EDUCATION/TRAINING PROGRAM

## 2024-01-31 PROCEDURE — 27201012 HC FORCEPS, HOT/COLD, DISP: Performed by: INTERNAL MEDICINE

## 2024-01-31 PROCEDURE — 45378 DIAGNOSTIC COLONOSCOPY: CPT | Mod: ,,, | Performed by: INTERNAL MEDICINE

## 2024-01-31 RX ORDER — PANTOPRAZOLE SODIUM 40 MG/1
40 TABLET, DELAYED RELEASE ORAL 2 TIMES DAILY
Qty: 180 TABLET | Refills: 3 | Status: SHIPPED | OUTPATIENT
Start: 2024-01-31 | End: 2025-01-30

## 2024-01-31 RX ORDER — PANTOPRAZOLE SODIUM 40 MG/1
40 TABLET, DELAYED RELEASE ORAL 2 TIMES DAILY
Qty: 180 TABLET | Refills: 3 | Status: SHIPPED | OUTPATIENT
Start: 2024-01-31 | End: 2024-02-06 | Stop reason: SDUPTHER

## 2024-01-31 RX ORDER — LIDOCAINE HYDROCHLORIDE 20 MG/ML
INJECTION INTRAVENOUS
Status: DISCONTINUED | OUTPATIENT
Start: 2024-01-31 | End: 2024-01-31

## 2024-01-31 RX ORDER — SODIUM CHLORIDE, SODIUM LACTATE, POTASSIUM CHLORIDE, CALCIUM CHLORIDE 600; 310; 30; 20 MG/100ML; MG/100ML; MG/100ML; MG/100ML
INJECTION, SOLUTION INTRAVENOUS CONTINUOUS PRN
Status: DISCONTINUED | OUTPATIENT
Start: 2024-01-31 | End: 2024-01-31

## 2024-01-31 RX ORDER — PROPOFOL 10 MG/ML
VIAL (ML) INTRAVENOUS
Status: DISCONTINUED | OUTPATIENT
Start: 2024-01-31 | End: 2024-01-31

## 2024-01-31 RX ADMIN — PROPOFOL 40 MG: 10 INJECTION, EMULSION INTRAVENOUS at 08:01

## 2024-01-31 RX ADMIN — PROPOFOL 20 MG: 10 INJECTION, EMULSION INTRAVENOUS at 08:01

## 2024-01-31 RX ADMIN — PROPOFOL 30 MG: 10 INJECTION, EMULSION INTRAVENOUS at 08:01

## 2024-01-31 RX ADMIN — PROPOFOL 80 MG: 10 INJECTION, EMULSION INTRAVENOUS at 08:01

## 2024-01-31 RX ADMIN — SODIUM CHLORIDE, SODIUM LACTATE, POTASSIUM CHLORIDE, AND CALCIUM CHLORIDE: 600; 310; 30; 20 INJECTION, SOLUTION INTRAVENOUS at 08:01

## 2024-01-31 RX ADMIN — GLYCOPYRROLATE 0.2 MG: 0.2 INJECTION, SOLUTION INTRAMUSCULAR; INTRAVITREAL at 08:01

## 2024-01-31 RX ADMIN — LIDOCAINE HYDROCHLORIDE 100 MG: 20 INJECTION INTRAVENOUS at 08:01

## 2024-01-31 NOTE — PROVATION PATIENT INSTRUCTIONS
Discharge Summary/Instructions after an Endoscopic Procedure  Patient Name: Joycelyn Wesley  Patient MRN: [id number]  Patient YOB: 1970 Wednesday, January 31, 2024 Betsey Russo MD  Dear patient,  As a result of recent federal legislation (The Federal Cures Act), you may   receive lab or pathology results from your procedure in your [facility]   account before your physician is able to contact you. Your physician or   their representative will relay the results to you with their   recommendations at their soonest availability.  Thank you,  RESTRICTIONS:  During your procedure today, you received medications for sedation.  These   medications may affect your judgment, balance and coordination.  Therefore,   for 24 hours, you have the following restrictions:   - DO NOT drive a car, operate machinery, make legal/financial decisions,   sign important papers or drink alcohol.    ACTIVITY:  Today: no heavy lifting, straining or running due to procedural   sedation/anesthesia.  The following day: return to full activity including work.  DIET:  Eat and drink normally unless instructed otherwise.     TREATMENT FOR COMMON SIDE EFFECTS:  - Mild abdominal pain, nausea, belching, bloating or excessive gas:  rest,   eat lightly and use a heating pad.  - Sore Throat: treat with throat lozenges and/or gargle with warm salt   water.  - Because air was used during the procedure, expelling large amounts of air   from your rectum or belching is normal.  - If a bowel prep was taken, you may not have a bowel movement for 1-3 days.    This is normal.  SYMPTOMS TO WATCH FOR AND REPORT TO YOUR PHYSICIAN:  1. Abdominal pain or bloating, other than gas cramps.  2. Chest pain.  3. Back pain.  4. Signs of infection such as: chills or fever occurring within 24 hours   after the procedure.  5. Rectal bleeding, which would show as bright red, maroon, or black stools.   (A tablespoon of blood from the rectum is not serious, especially if    hemorrhoids are present.)  6. Vomiting.  7. Weakness or dizziness.  GO DIRECTLY TO THE NEAREST EMERGENCY ROOM IF YOU HAVE ANY OF THE FOLLOWING:      Difficulty breathing              Chills and/or fever over 101 F   Persistent vomiting and/or vomiting blood   Severe abdominal pain   Severe chest pain   Black, tarry stools   Bleeding- more than one tablespoon   Any other symptom or condition that you feel may need urgent attention  Your doctor recommends these additional instructions:  If any biopsies were taken, your doctors clinic will contact you in 1 to 2   weeks with any results.  - Discharge patient to home (with escort).   - Resume previous diet.   - Continue present medications.   - Repeat colonoscopy in 10 years for screening purposes.   - Trial of Miralax 1 capful (17 grams) in 8 ounces of water PO daily.   - Return to GI clinic if symptoms worsen or do not improve.  For questions, problems or results please call your physician Betsey Russo MD at Work:  [phone]  If you have any questions about the above instructions, call the GI   department at [phone] or call the endoscopy unit at [phone]   from 7am until 3 pm.  [facility] [place], EMERGENCY ROOM PHONE NUMBER:   [phone]  IF A COMPLICATION OR EMERGENCY SITUATION ARISES AND YOU ARE UNABLE TO REACH   YOUR PHYSICIAN - GO DIRECTLY TO THE EMERGENCY ROOM.  I have read or have had read to me these discharge instructions for my   procedure and have received a written copy.  I understand these   instructions and will follow-up with my physician if I have any questions.     __________________________________       _____________________________________  Nurse Signature                                          Patient/Designated   Responsible Party Signature  MD Betsey Day MD  1/31/2024 9:07:13 AM  This report has been verified and signed electronically.  Dear patient,  As a result of recent federal  legislation (The Federal Cures Act), you may   receive lab or pathology results from your procedure in your [facility]   account before your physician is able to contact you. Your physician or   their representative will relay the results to you with their   recommendations at their soonest availability.  Thank you,  PROVATION

## 2024-01-31 NOTE — PROVATION PATIENT INSTRUCTIONS
Discharge Summary/Instructions after an Endoscopic Procedure  Patient Name: Joycelyn Wesley  Patient MRN: [id number]  Patient YOB: 1970 Wednesday, January 31, 2024 Betsey Russo MD  Dear patient,  As a result of recent federal legislation (The Federal Cures Act), you may   receive lab or pathology results from your procedure in your [facility]   account before your physician is able to contact you. Your physician or   their representative will relay the results to you with their   recommendations at their soonest availability.  Thank you,  RESTRICTIONS:  During your procedure today, you received medications for sedation.  These   medications may affect your judgment, balance and coordination.  Therefore,   for 24 hours, you have the following restrictions:   - DO NOT drive a car, operate machinery, make legal/financial decisions,   sign important papers or drink alcohol.    ACTIVITY:  Today: no heavy lifting, straining or running due to procedural   sedation/anesthesia.  The following day: return to full activity including work.  DIET:  Eat and drink normally unless instructed otherwise.     TREATMENT FOR COMMON SIDE EFFECTS:  - Mild abdominal pain, nausea, belching, bloating or excessive gas:  rest,   eat lightly and use a heating pad.  - Sore Throat: treat with throat lozenges and/or gargle with warm salt   water.  - Because air was used during the procedure, expelling large amounts of air   from your rectum or belching is normal.  - If a bowel prep was taken, you may not have a bowel movement for 1-3 days.    This is normal.  SYMPTOMS TO WATCH FOR AND REPORT TO YOUR PHYSICIAN:  1. Abdominal pain or bloating, other than gas cramps.  2. Chest pain.  3. Back pain.  4. Signs of infection such as: chills or fever occurring within 24 hours   after the procedure.  5. Rectal bleeding, which would show as bright red, maroon, or black stools.   (A tablespoon of blood from the rectum is not serious, especially if    hemorrhoids are present.)  6. Vomiting.  7. Weakness or dizziness.  GO DIRECTLY TO THE NEAREST EMERGENCY ROOM IF YOU HAVE ANY OF THE FOLLOWING:      Difficulty breathing              Chills and/or fever over 101 F   Persistent vomiting and/or vomiting blood   Severe abdominal pain   Severe chest pain   Black, tarry stools   Bleeding- more than one tablespoon   Any other symptom or condition that you feel may need urgent attention  Your doctor recommends these additional instructions:  If any biopsies were taken, your doctors clinic will contact you in 1 to 2   weeks with any results.  - Discharge patient to home after colonoscopy.   - Resume previous diet.   - Continue present medications.   - Increase Protonix from 40mg daily to twice a day for next two months then   reassess response.  - Await pathology results.   - Proceed with colonoscopy.  For questions, problems or results please call your physician Betsey Russo MD at Work:  [phone]  If you have any questions about the above instructions, call the GI   department at [phone] or call the endoscopy unit at [phone]   from 7am until 3 pm.  [facility] [place], EMERGENCY ROOM PHONE NUMBER:   [phone]  IF A COMPLICATION OR EMERGENCY SITUATION ARISES AND YOU ARE UNABLE TO REACH   YOUR PHYSICIAN - GO DIRECTLY TO THE EMERGENCY ROOM.  I have read or have had read to me these discharge instructions for my   procedure and have received a written copy.  I understand these   instructions and will follow-up with my physician if I have any questions.     __________________________________       _____________________________________  Nurse Signature                                          Patient/Designated   Responsible Party Signature  MD Betsey Day MD  1/31/2024 9:03:48 AM  This report has been verified and signed electronically.  Dear patient,  As a result of recent federal legislation (The Federal Cures  Act), you may   receive lab or pathology results from your procedure in your [facility]   account before your physician is able to contact you. Your physician or   their representative will relay the results to you with their   recommendations at their soonest availability.  Thank you,  PROVATION

## 2024-01-31 NOTE — ANESTHESIA PREPROCEDURE EVALUATION
01/31/2024  Joycelyn Wesley is a 53 y.o., female.    Anesthesia Evaluation    I have reviewed the Patient Summary Reports.     I have reviewed the Nursing Notes.    I have reviewed the Medications.     Review of Systems  Anesthesia Hx:  No problems with previous Anesthesia   History of prior surgery of interest to airway management or planning:  Previous anesthesia: General        Denies Family Hx of Anesthesia complications.    Denies Personal Hx of Anesthesia complications.                    Social:  Non-Smoker       Hematology/Oncology:  Hematology Normal   Oncology Normal                                   EENT/Dental:  EENT/Dental Normal           Cardiovascular:  Cardiovascular Normal                                            Pulmonary:  Pulmonary Normal                       Renal/:  Renal/ Normal                 Hepatic/GI:  Hepatic/GI Normal                 Musculoskeletal:  Musculoskeletal Normal                Neurological:  Neurology Normal                                      Endocrine:  Endocrine Normal            Dermatological:  Skin Normal    Psych:     Anxiety and takes meds as needed               Physical Exam  General:  Well nourished, Cooperative and Alert       Airway/Jaw/Neck:  Airway Findings: Mouth Opening: Normal     Tongue: Normal      General Airway Assessment: Adult      Mallampati: I  Improves to I with phonation.  TM Distance: Normal, at least 6 cm   Jaw/Neck Findings:     Neck ROM: Normal ROM          Dental:  Dental Findings: Partial Dentures           Mental Status:  Mental Status Findings:  Cooperative, Alert and Oriented         Anesthesia Plan  Type of Anesthesia, risks & benefits discussed:  Anesthesia Type:  MAC    Patient's Preference:   Plan Factors:          Intra-op Monitoring Plan: standard ASA monitors  Intra-op Monitoring Plan Comments:   Post Op Pain  Control Plan:   Post Op Pain Control Plan Comments:     Induction:   IV  Beta Blocker:  Patient is not currently on a Beta-Blocker (No further documentation required).       Informed Consent: Informed consent signed with the Patient and all parties understand the risks and agree with anesthesia plan.  All questions answered.  Anesthesia consent signed with patient.  ASA Score: 2     Day of Surgery Review of History & Physical: I have interviewed and examined the patient. I have reviewed the patient's H&P dated:  There are no significant changes.      Anesthesia Plan Notes: No H and P on chart at this time        Ready For Surgery From Anesthesia Perspective.             Physical Exam  General: Well nourished, Cooperative and Alert    Airway:  Mallampati: I / I  Mouth Opening: Normal  TM Distance: Normal, at least 6 cm  Tongue: Normal  Neck ROM: Normal ROM    Dental:  Partial Dentures        Anesthesia Plan  Type of Anesthesia, risks & benefits discussed:    Anesthesia Type: MAC  Intra-op Monitoring Plan: standard ASA monitors  Induction:  IV  Informed Consent: Informed consent signed with the Patient and all parties understand the risks and agree with anesthesia plan.  All questions answered.   ASA Score: 2  Day of Surgery Review of History & Physical: I have interviewed and examined the patient. I have reviewed the patient's H&P dated:   Anesthesia Plan Notes: No H and P on chart at this time    Ready For Surgery From Anesthesia Perspective.     .

## 2024-01-31 NOTE — TRANSFER OF CARE
"Anesthesia Transfer of Care Note    Patient: Joycelyn Wesley    Procedure(s) Performed: Procedure(s) (LRB):  EGD (ESOPHAGOGASTRODUODENOSCOPY) (N/A)  COLONOSCOPY (N/A)    Patient location: GI    Anesthesia Type: MAC    Transport from OR: Transported from OR on room air with adequate spontaneous ventilation    Post pain: adequate analgesia    Post assessment: no apparent anesthetic complications and tolerated procedure well    Post vital signs: stable    Level of consciousness: responds to stimulation    Nausea/Vomiting: no nausea/vomiting    Complications: none    Transfer of care protocol was followed      Last vitals: Visit Vitals  BP (!) 141/83 (BP Location: Left arm, Patient Position: Lying)   Pulse 89   Temp 37.1 °C (98.8 °F) (Temporal)   Resp 18   Ht 5' 6" (1.676 m)   Wt 72.1 kg (159 lb)   LMP 01/23/2019 (Exact Date)   SpO2 100%   Breastfeeding No   BMI 25.66 kg/m²     "

## 2024-01-31 NOTE — H&P
PRE PROCEDURE H&P    Patient Name: Joycelyn Wesley  MRN: [id number]  : 1970  Date of Procedure:  2024  Referring Physician: Salty Costello MD  Primary Physician: Jenn Womack MD  Procedure Physician: Betsey Russo MD       Planned Procedure: Colonoscopy and EGD  Diagnosis: LLQ pain  and Reflux    Chief Complaint: Same as above    HPI: Patient is an 53 y.o. female is here for the above. Evaluated by Dr. Costello 2023 for LLQ pain and burning reflux. Patient reports similar L sided abdominal pain 9 years when seeing her Ob-Gyn who ordered a colonoscopy. She underwent a colonoscopy at that time that was normal. Also test positive for H. Pylori last year and was positive. Treated with antibiotic therapy for this and remains on Protonix daily. Per insurance CT abd/pelvis performed and negative prior to undergoing colonoscopy. No previous EGD. Fhx of maternal GM with CRC in her 70's. No blood thinners.  at bedside.     Last colonoscopy:   Family history: MGM in her 70's  Anticoagulation: none    Past Medical History:   Past Medical History:   Diagnosis Date    Fibroids     Mild vitamin D deficiency 2015    Panic attacks     Surgical menopause         Past Surgical History:  Past Surgical History:   Procedure Laterality Date     SECTION, LOW TRANSVERSE      x 2    COLONOSCOPY N/A 2015    Procedure: COLONOSCOPY;  Surgeon: Salty Costello MD;  Location: [facility];  Service: Endoscopy;  Laterality: N/A;    HYSTERECTOMY  04/15/2019    Fibroids    TOTAL ABDOMINAL HYSTERECTOMY W/ BILATERAL SALPINGOOPHORECTOMY  04/15/2019    Due to fibroids    TOTAL ABDOMINAL HYSTERECTOMY W/ BILATERAL SALPINGOOPHORECTOMY  2019    fibroids    TUBAL LIGATION          Home Medications:  Prior to Admission medications    Medication Sig Start Date End Date Taking? Authorizing Provider   amLODIPine (NORVASC) 2.5 MG tablet Take 1 tablet (2.5 mg total) by mouth every morning. 23  Yes Jenn Womack  MD Karyn   cholecalciferol, vitamin D3, (REPLESTA) 1,250 mcg (50,000 unit) Wafr Take by mouth once a week.   Yes Provider, Historical   pantoprazole (PROTONIX) 40 MG tablet Take 40 mg by mouth. 11/2/23  Yes Provider, Historical   prasterone, DHEA, (DHEA ORAL) Take 5 mg by mouth once daily.   Yes Provider, Historical   progesterone (PROMETRIUM) 100 MG capsule Take 100 mg by mouth once daily.   Yes Provider, Historical   thiamine (VITAMIN B-1) 250 MG tablet Take 250 mg by mouth once daily.   Yes Provider, Historical        Allergies:  Review of patient's allergies indicates:  No Known Allergies     Social History:   Social History     Socioeconomic History    Marital status:     Number of children: 3   Occupational History    Occupation: Homemaker   Tobacco Use    Smoking status: Never    Smokeless tobacco: Never   Substance and Sexual Activity    Alcohol use: No     Alcohol/week: 0.0 standard drinks of alcohol    Drug use: No    Sexual activity: Yes     Partners: Male     Birth control/protection: Surgical   Social History Narrative    She wears seatbelt.     Social Determinants of Health     Physical Activity: Inactive (6/7/2023)    Exercise Vital Sign     Days of Exercise per Week: 0 days     Minutes of Exercise per Session: 0 min   Stress: No Stress Concern Present (9/13/2019)    [employer] - Occupational Stress Questionnaire     Feeling of Stress : Not at all   Social Connections: Moderately Integrated (9/13/2019)    Social Connection and Isolation Panel [NHANES]     Frequency of Communication with Friends and Family: Twice a week     Frequency of Social Gatherings with Friends and Family: Once a week     Attends Buddhism Services: More than 4 times per year     Active Member of Clubs or Organizations: No     Attends Club or Organization Meetings: Never     Marital Status:        Family History:  Family History   Problem Relation Age of Onset    Cancer Mother          "Breast cancer    Breast cancer Mother     Emphysema Father     Lung cancer Father     Panic disorder Brother     Cancer Maternal Aunt         Breast cancer    Breast cancer Maternal Aunt     Cancer Maternal Grandmother         Colon cancer    Colon cancer Maternal Grandmother     Cancer Maternal Aunt         Breast cancer    No Known Problems Daughter     No Known Problems Son     No Known Problems Son     No Known Problems Sister     Heart disease Neg Hx     Diabetes Neg Hx     Stroke Neg Hx     Hypertension Neg Hx        ROS: No acute cardiac events, no acute respiratory complaints.     Physical Exam (all patients):    BP (!) 141/83 (BP Location: Left arm, Patient Position: Lying)   Pulse 89   Temp 98.8 °F (37.1 °C) (Temporal)   Resp 18   Ht 5' 6" (1.676 m)   Wt 72.1 kg (159 lb)   LMP 01/23/2019 (Exact Date) Comment: Monthly  SpO2 100%   Breastfeeding No   BMI 25.66 kg/m²   Lungs: Clear to auscultation bilaterally, respirations unlabored  Heart: Regular rate and rhythm, S1 and S2 normal, no obvious murmurs  Abdomen:         Soft, non-tender, bowel sounds normal, no masses, no organomegaly    Lab Results   Component Value Date    WBC 4.04 11/13/2023    MCV 96 11/13/2023    RDW 11.7 11/13/2023     11/13/2023    INR 1.0 12/09/2014    GLU 82 11/13/2023    HGBA1C 4.2 06/07/2023    BUN 8 11/13/2023     11/13/2023    K 3.8 11/13/2023     11/13/2023        SEDATION PLAN: per anesthesia      History reviewed, vital signs satisfactory, cardiopulmonary status satisfactory, sedation options, risks and plans have been discussed with the patient  All their questions were answered and the patient agrees to the sedation procedures as planned and the patient is deemed an appropriate candidate for the sedation as planned.    The risks, benefits and alternatives of the procedure were discussed with the patient in detail. This discussion was had in the presence of her  and endoscopy staff. The risks " include, risks of adverse reaction to sedation requiring the use of reversal agents, bleeding requiring blood transfusion, perforation requiring surgical intervention and technical failure. Other risks include aspiration leading to respiratory distress and respiratory failure resulting in endotracheal intubation and mechanical ventilation including death. If anesthesia is being utilized for this procedure, it is up to the anesthesiologist to determine airway safety including elective endotracheal intubation. Questions were answered, they agree to proceed. There was no language barriers.      Procedure explained to patient, informed consent obtained and placed in chart.    Betsey Russo  1/31/2024  8:36 AM

## 2024-01-31 NOTE — ANESTHESIA POSTPROCEDURE EVALUATION
Anesthesia Post Evaluation    Patient: Joycelyn Wesley    Procedure(s) Performed: Procedure(s) (LRB):  EGD (ESOPHAGOGASTRODUODENOSCOPY) (N/A)  COLONOSCOPY (N/A)    Final Anesthesia Type: MAC      Patient location during evaluation: GI PACU  Patient participation: Yes- Able to Participate  Level of consciousness: awake and alert and oriented  Post-procedure vital signs: reviewed and stable  Pain management: adequate  Airway patency: patent  DAISY mitigation strategies: Multimodal analgesia  PONV status at discharge: No PONV  Anesthetic complications: no      Cardiovascular status: hemodynamically stable  Respiratory status: unassisted, spontaneous ventilation and room air  Hydration status: euvolemic  Follow-up not needed.              Vitals Value Taken Time   /78 01/31/24 0859   Temp 37 °C (98.6 °F) 01/31/24 0859   Pulse 91 01/31/24 0859   Resp 18 01/31/24 0859   SpO2 100 % 01/31/24 0859         No case tracking events are documented in the log.      Pain/Galindo Score: Galindo Score: 6 (1/31/2024  8:59 AM)

## 2024-02-01 VITALS
WEIGHT: 159 LBS | SYSTOLIC BLOOD PRESSURE: 141 MMHG | BODY MASS INDEX: 25.55 KG/M2 | DIASTOLIC BLOOD PRESSURE: 82 MMHG | RESPIRATION RATE: 20 BRPM | TEMPERATURE: 99 F | HEIGHT: 66 IN | HEART RATE: 88 BPM | OXYGEN SATURATION: 100 %

## 2024-02-01 LAB
FINAL PATHOLOGIC DIAGNOSIS: NORMAL
GROSS: NORMAL
Lab: NORMAL

## 2024-02-02 ENCOUNTER — TELEPHONE (OUTPATIENT)
Dept: FAMILY MEDICINE | Facility: CLINIC | Age: 54
End: 2024-02-02

## 2024-02-02 NOTE — TELEPHONE ENCOUNTER
I did not give order for UTI treatment.  Perhaps order was given from another provider or department.

## 2024-02-02 NOTE — TELEPHONE ENCOUNTER
----- Message from Feliciano Corley sent at 2/2/2024  4:51 PM CST -----  Contact: Joycelyn Hirsch would like a call back at  [phone] in regards to needing to speak with nurse about getting an update on the medication that was supposed to be sent over to her pharmacy for UTI.  Thanks   Am

## 2024-02-05 ENCOUNTER — TELEPHONE (OUTPATIENT)
Dept: FAMILY MEDICINE | Facility: CLINIC | Age: 54
End: 2024-02-05
Payer: COMMERCIAL

## 2024-02-05 NOTE — TELEPHONE ENCOUNTER
Patient call returned. She stated that she took a at home urine test and it came back positive for UTI. She informed that she wanted to have a medication called in to her pharmacy. Patient was advised that nothing was available for Dr. Womack. She was scheduled to see Dr. Womack NP on tomorrow for 4:40 pm. Patient appt scheduled. She verbally understood the appt information given.

## 2024-02-05 NOTE — PROGRESS NOTES
The biopsies of your stomach are normal. Please increase the acid blocker Protonix to twice a day like we discussed for 2 months the decrease to daily thereafter. Remember, it is most effective when taken 30 min before your morning meal and 30 min before your evening meal.     We also discussed your left sided pain. You have had two colonoscopies and CT scan for this pain. Fortunately there has been no concerning findings. Take over the counter Miralax daily. Please take it consistently to help you move and empty your bowels more effectively. Follow up with Dr. Womack your primary care provider.

## 2024-02-05 NOTE — TELEPHONE ENCOUNTER
----- Message from Sofie Luke sent at 2/5/2024  7:32 AM CST -----  .Type:  Patient Returning Call    Who Called:.Joycelyn Wesley   Who Left Message for Patient:YANETH  Does the patient know what this is regarding?:positive for UTI, call something in or make an appt  Would the patient rather a call back or a response via [facility]? Call back  Best Call Back Number:.[phone]    Additional Information:

## 2024-02-06 ENCOUNTER — OFFICE VISIT (OUTPATIENT)
Dept: FAMILY MEDICINE | Facility: CLINIC | Age: 54
End: 2024-02-06
Payer: COMMERCIAL

## 2024-02-06 VITALS
OXYGEN SATURATION: 98 % | SYSTOLIC BLOOD PRESSURE: 134 MMHG | HEART RATE: 86 BPM | WEIGHT: 164.44 LBS | HEIGHT: 66 IN | DIASTOLIC BLOOD PRESSURE: 82 MMHG | BODY MASS INDEX: 26.43 KG/M2 | TEMPERATURE: 99 F

## 2024-02-06 DIAGNOSIS — B37.9 YEAST INFECTION: ICD-10-CM

## 2024-02-06 DIAGNOSIS — K21.9 GASTROESOPHAGEAL REFLUX DISEASE, UNSPECIFIED WHETHER ESOPHAGITIS PRESENT: ICD-10-CM

## 2024-02-06 DIAGNOSIS — R35.0 FREQUENCY OF URINATION: Primary | ICD-10-CM

## 2024-02-06 DIAGNOSIS — R10.32 LLQ PAIN: ICD-10-CM

## 2024-02-06 PROCEDURE — 3075F SYST BP GE 130 - 139MM HG: CPT | Mod: CPTII,S$GLB,, | Performed by: NURSE PRACTITIONER

## 2024-02-06 PROCEDURE — 1160F RVW MEDS BY RX/DR IN RCRD: CPT | Mod: CPTII,S$GLB,, | Performed by: NURSE PRACTITIONER

## 2024-02-06 PROCEDURE — 1159F MED LIST DOCD IN RCRD: CPT | Mod: CPTII,S$GLB,, | Performed by: NURSE PRACTITIONER

## 2024-02-06 PROCEDURE — 99999 PR PBB SHADOW E&M-EST. PATIENT-LVL III: CPT | Mod: PBBFAC,,, | Performed by: NURSE PRACTITIONER

## 2024-02-06 PROCEDURE — 81001 URINALYSIS AUTO W/SCOPE: CPT | Performed by: NURSE PRACTITIONER

## 2024-02-06 PROCEDURE — 99214 OFFICE O/P EST MOD 30 MIN: CPT | Mod: S$GLB,,, | Performed by: NURSE PRACTITIONER

## 2024-02-06 PROCEDURE — 3008F BODY MASS INDEX DOCD: CPT | Mod: CPTII,S$GLB,, | Performed by: NURSE PRACTITIONER

## 2024-02-06 PROCEDURE — 3079F DIAST BP 80-89 MM HG: CPT | Mod: CPTII,S$GLB,, | Performed by: NURSE PRACTITIONER

## 2024-02-06 RX ORDER — CIPROFLOXACIN 500 MG/1
500 TABLET ORAL EVERY 12 HOURS
Qty: 10 TABLET | Refills: 0 | Status: SHIPPED | OUTPATIENT
Start: 2024-02-06 | End: 2024-02-11

## 2024-02-06 RX ORDER — PANTOPRAZOLE SODIUM 40 MG/1
40 TABLET, DELAYED RELEASE ORAL 2 TIMES DAILY
Qty: 180 TABLET | Refills: 3 | Status: SHIPPED | OUTPATIENT
Start: 2024-02-06 | End: 2025-02-05

## 2024-02-06 RX ORDER — FLUCONAZOLE 200 MG/1
200 TABLET ORAL ONCE
Qty: 1 TABLET | Refills: 0 | Status: SHIPPED | OUTPATIENT
Start: 2024-02-06 | End: 2024-02-06

## 2024-02-07 ENCOUNTER — PATIENT MESSAGE (OUTPATIENT)
Dept: GASTROENTEROLOGY | Facility: CLINIC | Age: 54
End: 2024-02-07
Payer: COMMERCIAL

## 2024-02-07 LAB
BILIRUB UR QL STRIP: NEGATIVE
CLARITY UR REFRACT.AUTO: CLEAR
COLOR UR AUTO: YELLOW
GLUCOSE UR QL STRIP: NEGATIVE
HGB UR QL STRIP: ABNORMAL
KETONES UR QL STRIP: NEGATIVE
LEUKOCYTE ESTERASE UR QL STRIP: ABNORMAL
MICROSCOPIC COMMENT: ABNORMAL
NITRITE UR QL STRIP: NEGATIVE
PH UR STRIP: 6 [PH] (ref 5–8)
PROT UR QL STRIP: NEGATIVE
RBC #/AREA URNS AUTO: 20 /HPF (ref 0–4)
SP GR UR STRIP: 1.02 (ref 1–1.03)
SQUAMOUS #/AREA URNS AUTO: 1 /HPF
URN SPEC COLLECT METH UR: ABNORMAL
WBC #/AREA URNS AUTO: 16 /HPF (ref 0–5)

## 2024-02-13 NOTE — PROGRESS NOTES
Joycelyn Wesley  2024  [id number]    Jenn Womack MD  Patient Care Team:  Jenn Womack MD as PCP - General (Family Medicine)  Vicky Austin LPN as Care Coordinator (Internal Medicine)  Regina Gupta MD as Obstetrician (Obstetrics)          Visit Type:a scheduled routine follow-up visit    Chief Complaint:  Chief Complaint   Patient presents with    Follow-up       History of Present Illness:    53 year old female presents with complaint of left lower quad pain and discomfort for the past two days. Denies burning with urination, urinary frequency and urgency, CVA tenderness, fever, body aches, chills, abdominal pain, vaginal discharge or vaginal pain.     Pt is also requesting medication refill for protonix    History:      Past Surgical History:   Procedure Laterality Date     SECTION, LOW TRANSVERSE      x 2    COLONOSCOPY N/A 2015    Procedure: COLONOSCOPY;  Surgeon: Salty Costello MD;  Location: [facility];  Service: Endoscopy;  Laterality: N/A;    COLONOSCOPY N/A 2024    Procedure: COLONOSCOPY;  Surgeon: Betsey Russo MD;  Location: [facility];  Service: Endoscopy;  Laterality: N/A;    ESOPHAGOGASTRODUODENOSCOPY N/A 2024    Procedure: EGD (ESOPHAGOGASTRODUODENOSCOPY);  Surgeon: Betsey Russo MD;  Location: [facility];  Service: Endoscopy;  Laterality: N/A;    HYSTERECTOMY  04/15/2019    Fibroids    TOTAL ABDOMINAL HYSTERECTOMY W/ BILATERAL SALPINGOOPHORECTOMY  04/15/2019    Due to fibroids    TOTAL ABDOMINAL HYSTERECTOMY W/ BILATERAL SALPINGOOPHORECTOMY  2019    fibroids    TUBAL LIGATION       Family History   Problem Relation Age of Onset    Cancer Mother         Breast cancer    Breast cancer Mother     Emphysema Father     Lung cancer Father     Panic disorder Brother     Cancer Maternal Aunt         Breast cancer    Breast cancer Maternal Aunt     Cancer Maternal Grandmother         Colon cancer    Colon cancer Maternal Grandmother     Cancer  Maternal Aunt         Breast cancer    No Known Problems Daughter     No Known Problems Son     No Known Problems Son     No Known Problems Sister     Heart disease Neg Hx     Diabetes Neg Hx     Stroke Neg Hx     Hypertension Neg Hx      Social History     Socioeconomic History    Marital status:     Number of children: 3   Occupational History    Occupation: Homemaker   Tobacco Use    Smoking status: Never    Smokeless tobacco: Never   Substance and Sexual Activity    Alcohol use: No     Alcohol/week: 0.0 standard drinks of alcohol    Drug use: No    Sexual activity: Yes     Partners: Male     Birth control/protection: Surgical   Social History Narrative    She wears seatbelt.     Social Determinants of Health     Physical Activity: Inactive (6/7/2023)    Exercise Vital Sign     Days of Exercise per Week: 0 days     Minutes of Exercise per Session: 0 min   Stress: No Stress Concern Present (9/13/2019)    [employer] - Occupational Stress Questionnaire     Feeling of Stress : Not at all   Social Connections: Moderately Integrated (9/13/2019)    Social Connection and Isolation Panel [NHANES]     Frequency of Communication with Friends and Family: Twice a week     Frequency of Social Gatherings with Friends and Family: Once a week     Attends Jain Services: More than 4 times per year     Active Member of Clubs or Organizations: No     Attends Club or Organization Meetings: Never     Marital Status:      Patient Active Problem List   Diagnosis    LLQ pain    Mild vitamin D deficiency    Low ferritin level    Hot flashes due to surgical menopause    Overweight (BMI 25.0-29.9)    Hematuria, microscopic    Surgical menopause    Primary hypertension    Gastroesophageal reflux disease without esophagitis     Review of patient's allergies indicates:  No Known Allergies    The following were reviewed at this visit: active problem list, medication list, allergies, family  history, social history, and health maintenance.    Medications:  Current Outpatient Medications on File Prior to Visit   Medication Sig Dispense Refill    cholecalciferol, vitamin D3, (REPLESTA) 1,250 mcg (50,000 unit) Wafr Take by mouth once a week.      pantoprazole (PROTONIX) 40 MG tablet Take 1 tablet (40 mg total) by mouth 2 (two) times daily. 180 tablet 3    prasterone, DHEA, (DHEA ORAL) Take 5 mg by mouth once daily.      progesterone (PROMETRIUM) 100 MG capsule Take 100 mg by mouth once daily.      thiamine (VITAMIN B-1) 250 MG tablet Take 250 mg by mouth once daily.       No current facility-administered medications on file prior to visit.       Medications have been reviewed and reconciled with patient at this visit.  Barriers to medications reviewed with patient.    Adverse reactions to current medications reviewed with patient..    Over the counter medications reviewed and reconciled with patient.    Exam:  Wt Readings from Last 3 Encounters:   02/06/24 74.6 kg (164 lb 7.4 oz)   01/31/24 72.1 kg (159 lb)   11/13/23 72.2 kg (159 lb 2.8 oz)     Temp Readings from Last 3 Encounters:   02/06/24 98.5 °F (36.9 °C) (Tympanic)   01/31/24 98.6 °F (37 °C) (Temporal)   11/06/23 98 °F (36.7 °C) (Tympanic)     BP Readings from Last 3 Encounters:   02/06/24 134/82   01/31/24 (!) 141/82   11/13/23 130/87     Pulse Readings from Last 3 Encounters:   02/06/24 86   01/31/24 88   11/13/23 76     Body mass index is 26.55 kg/m².      Review of Systems   Constitutional:  Negative for fever.   Respiratory:  Negative for cough, shortness of breath and wheezing.    Cardiovascular:  Negative for chest pain and palpitations.   Gastrointestinal:  Negative for nausea.   Genitourinary:  Positive for dysuria.   Neurological:  Negative for speech change, weakness and headaches.   All other systems reviewed and are negative.    Physical Exam  Vitals and nursing note reviewed.   Constitutional:       Appearance: Normal appearance. She  is obese.   HENT:      Head: Normocephalic and atraumatic.      Right Ear: Tympanic membrane, ear canal and external ear normal.      Left Ear: Tympanic membrane, ear canal and external ear normal.      Nose: Nose normal.      Mouth/Throat:      Mouth: Mucous membranes are moist.      Pharynx: Oropharynx is clear.   Eyes:      Extraocular Movements: Extraocular movements intact.      Conjunctiva/sclera: Conjunctivae normal.      Pupils: Pupils are equal, round, and reactive to light.   Cardiovascular:      Rate and Rhythm: Normal rate and regular rhythm.      Pulses: Normal pulses.      Heart sounds: Normal heart sounds.   Pulmonary:      Effort: Pulmonary effort is normal.      Breath sounds: Normal breath sounds.   Abdominal:      General: Bowel sounds are normal.      Palpations: Abdomen is soft.       Musculoskeletal:         General: Normal range of motion.      Cervical back: Normal range of motion and neck supple.   Skin:     General: Skin is warm and dry.      Capillary Refill: Capillary refill takes less than 2 seconds.   Neurological:      General: No focal deficit present.      Mental Status: She is alert and oriented to person, place, and time.   Psychiatric:         Mood and Affect: Mood normal.         Behavior: Behavior normal.         Thought Content: Thought content normal.         Judgment: Judgment normal.         Laboratory Reviewed ({Yes)  Lab Results   Component Value Date    WBC 4.04 11/13/2023    HGB 12.5 11/13/2023    HCT 37.9 11/13/2023     11/13/2023    CHOL 211 (H) 06/07/2023    TRIG 61 06/07/2023    HDL 59 06/07/2023    ALT 11 11/13/2023    AST 15 11/13/2023     11/13/2023    K 3.8 11/13/2023     11/13/2023    CREATININE 0.7 11/13/2023    BUN 8 11/13/2023    CO2 28 11/13/2023    TSH 0.806 06/07/2023    INR 1.0 12/09/2014    HGBA1C 4.2 06/07/2023       Joycelyn was seen today for follow-up.    Diagnoses and all orders for this visit:    Frequency of urination  -      Urinalysis  -     Urinalysis  -     POCT URINALYSIS  -     ciprofloxacin HCl (CIPRO) 500 MG tablet; Take 1 tablet (500 mg total) by mouth every 12 (twelve) hours. for 5 days    LLQ pain    Yeast infection  -     fluconazole (DIFLUCAN) 200 MG Tab; Take 1 tablet (200 mg total) by mouth once. for 1 dose    Gastroesophageal reflux disease, unspecified whether esophagitis present  -     pantoprazole (PROTONIX) 40 MG tablet; Take 1 tablet (40 mg total) by mouth 2 (two) times daily.    Other orders  -     Urinalysis Microscopic        Plan   Start cipro and diflucan   Protonix   UA        Care Plan/Goals: Reviewed    Goals    Liz Hirsch was seen today for follow-up.    Diagnoses and all orders for this visit:    Frequency of urination  -     Urinalysis  -     Urinalysis  -     POCT URINALYSIS  -     ciprofloxacin HCl (CIPRO) 500 MG tablet; Take 1 tablet (500 mg total) by mouth every 12 (twelve) hours. for 5 days    LLQ pain    Yeast infection  -     fluconazole (DIFLUCAN) 200 MG Tab; Take 1 tablet (200 mg total) by mouth once. for 1 dose    Gastroesophageal reflux disease, unspecified whether esophagitis present  -     pantoprazole (PROTONIX) 40 MG tablet; Take 1 tablet (40 mg total) by mouth 2 (two) times daily.    Other orders  -     Urinalysis Microscopic         Follow up: Follow up if symptoms worsen or fail to improve.    After visit summary was printed and given to patient upon discharge today.  Patient goals and care plan are included in After Visit Summary.

## 2024-10-07 ENCOUNTER — TELEPHONE (OUTPATIENT)
Dept: FAMILY MEDICINE | Facility: CLINIC | Age: 54
End: 2024-10-07
Payer: COMMERCIAL

## 2024-10-07 NOTE — TELEPHONE ENCOUNTER
----- Message from Vale sent at 10/7/2024 10:45 AM CDT -----  Regarding: new rx  Contact: Joycelyn  .Type:  Needs Medical Advice    Who Called: Joycelyn   Symptoms (please be specific):  uti   How long has patient had these symptoms:  1-3 days   Pharmacy name and phone #:     [facility] #[id number] -   [address]  [address] [facility] [address]  Phone: [phone] Fax: [phone]    Would the patient rather a call back or a response via [facility]? call  Best Call Back Number:  [phone] (home)    Additional Information:

## 2024-10-07 NOTE — TELEPHONE ENCOUNTER
Pt called in c/o UTI symptoms. Pt states she is having difficulty urinating and is having slight pains in her bladder area. Pt states sx have been present for 2 days. Please advise.

## 2024-10-07 NOTE — TELEPHONE ENCOUNTER
Left message to call clinic back.      Who Called: Joycelyn   Symptoms (please be specific):  uti   How long has patient had these symptoms:  1-3 days   Pharmacy name and phone #:     [facility] #[id number] -   [address]   [address]   [facility] [place] [address]   Phone: [phone] Fax: [phone]     Would the patient rather a call back or a response via [facility]? call   Best Call Back Number:  [phone] (home)    Additional Information:

## 2024-10-07 NOTE — TELEPHONE ENCOUNTER
----- Message from Vale sent at 10/7/2024  2:45 PM CDT -----  Regarding: Patient Rturning Call  Contact: Joycelyn  .Type:  Patient Returning Call    Who Called:Joycelyn   Who Left Message for Patient: Katherine Alcantara MA  Does the patient know what this is regarding?:  Would the patient rather a call back or a response via [facility]? call  Best Call Back Number:[phone] (home)    Additional Information:  Please call again.

## 2024-10-08 ENCOUNTER — TELEPHONE (OUTPATIENT)
Dept: FAMILY MEDICINE | Facility: CLINIC | Age: 54
End: 2024-10-08
Payer: COMMERCIAL

## 2024-10-08 NOTE — TELEPHONE ENCOUNTER
----- Message from Vale sent at 10/8/2024  8:57 AM CDT -----  Regarding: soon appt  Contact: chris  .Type:  Sooner Apoointment Request    Caller is requesting a sooner appointment.  Caller declined first available appointment listed below.  Caller will not accept being placed on the waitlist and is requesting a message be sent to doctor.  Name of Caller: Chris   When is the first available appointment? 12/2024  Symptoms: bladder infection   Would the patient rather a call back or a response via [facility]? call   Best Call Back Number: [phone] (home)    Additional Information: Chris spoke to [facility] and was told she can do a virtual or in person visit. Epic did not have a date this month to offer. Please call the patient if she can be fit in.

## 2024-11-13 ENCOUNTER — LAB VISIT (OUTPATIENT)
Dept: LAB | Facility: HOSPITAL | Age: 54
End: 2024-11-13
Attending: REGISTERED NURSE
Payer: COMMERCIAL

## 2024-11-13 ENCOUNTER — OFFICE VISIT (OUTPATIENT)
Dept: FAMILY MEDICINE | Facility: CLINIC | Age: 54
End: 2024-11-13
Payer: COMMERCIAL

## 2024-11-13 VITALS
DIASTOLIC BLOOD PRESSURE: 84 MMHG | SYSTOLIC BLOOD PRESSURE: 122 MMHG | WEIGHT: 160.94 LBS | HEIGHT: 66 IN | TEMPERATURE: 97 F | OXYGEN SATURATION: 100 % | HEART RATE: 69 BPM | BODY MASS INDEX: 25.86 KG/M2

## 2024-11-13 DIAGNOSIS — Z00.00 PREVENTATIVE HEALTH CARE: ICD-10-CM

## 2024-11-13 DIAGNOSIS — E55.9 MILD VITAMIN D DEFICIENCY: ICD-10-CM

## 2024-11-13 DIAGNOSIS — I10 PRIMARY HYPERTENSION: ICD-10-CM

## 2024-11-13 DIAGNOSIS — E78.00 HYPERCHOLESTEROLEMIA: ICD-10-CM

## 2024-11-13 DIAGNOSIS — H93.13 TINNITUS OF BOTH EARS: ICD-10-CM

## 2024-11-13 DIAGNOSIS — E66.3 OVERWEIGHT WITH BODY MASS INDEX (BMI) OF 25 TO 25.9 IN ADULT: ICD-10-CM

## 2024-11-13 DIAGNOSIS — Z00.00 PREVENTATIVE HEALTH CARE: Primary | ICD-10-CM

## 2024-11-13 PROBLEM — E89.40 SURGICAL MENOPAUSE: Status: RESOLVED | Noted: 2022-06-07 | Resolved: 2024-11-13

## 2024-11-13 PROBLEM — R31.29 HEMATURIA, MICROSCOPIC: Status: RESOLVED | Noted: 2021-04-05 | Resolved: 2024-11-13

## 2024-11-13 LAB
25(OH)D3+25(OH)D2 SERPL-MCNC: 33 NG/ML (ref 30–96)
ALBUMIN SERPL BCP-MCNC: 3.8 G/DL (ref 3.5–5.2)
ALP SERPL-CCNC: 49 U/L (ref 40–150)
ALT SERPL W/O P-5'-P-CCNC: 11 U/L (ref 10–44)
ANION GAP SERPL CALC-SCNC: 9 MMOL/L (ref 8–16)
AST SERPL-CCNC: 17 U/L (ref 10–40)
BASOPHILS # BLD AUTO: 0.02 K/UL (ref 0–0.2)
BASOPHILS NFR BLD: 0.5 % (ref 0–1.9)
BILIRUB SERPL-MCNC: 0.7 MG/DL (ref 0.1–1)
BUN SERPL-MCNC: 7 MG/DL (ref 6–20)
CALCIUM SERPL-MCNC: 9.3 MG/DL (ref 8.7–10.5)
CHLORIDE SERPL-SCNC: 104 MMOL/L (ref 95–110)
CHOLEST SERPL-MCNC: 201 MG/DL (ref 120–199)
CHOLEST/HDLC SERPL: 3.7 {RATIO} (ref 2–5)
CO2 SERPL-SCNC: 25 MMOL/L (ref 23–29)
CREAT SERPL-MCNC: 0.8 MG/DL (ref 0.5–1.4)
DIFFERENTIAL METHOD BLD: ABNORMAL
EOSINOPHIL # BLD AUTO: 0.1 K/UL (ref 0–0.5)
EOSINOPHIL NFR BLD: 1.4 % (ref 0–8)
ERYTHROCYTE [DISTWIDTH] IN BLOOD BY AUTOMATED COUNT: 11.9 % (ref 11.5–14.5)
EST. GFR  (NO RACE VARIABLE): >60 ML/MIN/1.73 M^2
ESTIMATED AVG GLUCOSE: 71 MG/DL (ref 68–131)
GLUCOSE SERPL-MCNC: 63 MG/DL (ref 70–110)
HBA1C MFR BLD: 4.1 % (ref 4–5.6)
HCT VFR BLD AUTO: 38.9 % (ref 37–48.5)
HDLC SERPL-MCNC: 54 MG/DL (ref 40–75)
HDLC SERPL: 26.9 % (ref 20–50)
HGB BLD-MCNC: 13.2 G/DL (ref 12–16)
IMM GRANULOCYTES # BLD AUTO: 0.01 K/UL (ref 0–0.04)
IMM GRANULOCYTES NFR BLD AUTO: 0.2 % (ref 0–0.5)
LDLC SERPL CALC-MCNC: 136.2 MG/DL (ref 63–159)
LYMPHOCYTES # BLD AUTO: 1.5 K/UL (ref 1–4.8)
LYMPHOCYTES NFR BLD: 34.8 % (ref 18–48)
MCH RBC QN AUTO: 32.4 PG (ref 27–31)
MCHC RBC AUTO-ENTMCNC: 33.9 G/DL (ref 32–36)
MCV RBC AUTO: 95 FL (ref 82–98)
MONOCYTES # BLD AUTO: 0.3 K/UL (ref 0.3–1)
MONOCYTES NFR BLD: 6.2 % (ref 4–15)
NEUTROPHILS # BLD AUTO: 2.4 K/UL (ref 1.8–7.7)
NEUTROPHILS NFR BLD: 56.9 % (ref 38–73)
NONHDLC SERPL-MCNC: 147 MG/DL
NRBC BLD-RTO: 0 /100 WBC
PLATELET # BLD AUTO: 227 K/UL (ref 150–450)
PMV BLD AUTO: 10.6 FL (ref 9.2–12.9)
POTASSIUM SERPL-SCNC: 4.6 MMOL/L (ref 3.5–5.1)
PROT SERPL-MCNC: 7.9 G/DL (ref 6–8.4)
RBC # BLD AUTO: 4.08 M/UL (ref 4–5.4)
SODIUM SERPL-SCNC: 138 MMOL/L (ref 136–145)
TRIGL SERPL-MCNC: 54 MG/DL (ref 30–150)
TSH SERPL DL<=0.005 MIU/L-ACNC: 0.94 UIU/ML (ref 0.4–4)
VIT B12 SERPL-MCNC: 1566 PG/ML (ref 210–950)
WBC # BLD AUTO: 4.2 K/UL (ref 3.9–12.7)

## 2024-11-13 PROCEDURE — 3008F BODY MASS INDEX DOCD: CPT | Mod: CPTII,S$GLB,, | Performed by: REGISTERED NURSE

## 2024-11-13 PROCEDURE — 3074F SYST BP LT 130 MM HG: CPT | Mod: CPTII,S$GLB,, | Performed by: REGISTERED NURSE

## 2024-11-13 PROCEDURE — 85025 COMPLETE CBC W/AUTO DIFF WBC: CPT | Performed by: REGISTERED NURSE

## 2024-11-13 PROCEDURE — 82306 VITAMIN D 25 HYDROXY: CPT | Performed by: REGISTERED NURSE

## 2024-11-13 PROCEDURE — 82607 VITAMIN B-12: CPT | Performed by: REGISTERED NURSE

## 2024-11-13 PROCEDURE — 84443 ASSAY THYROID STIM HORMONE: CPT | Performed by: REGISTERED NURSE

## 2024-11-13 PROCEDURE — 80053 COMPREHEN METABOLIC PANEL: CPT | Performed by: REGISTERED NURSE

## 2024-11-13 PROCEDURE — 3044F HG A1C LEVEL LT 7.0%: CPT | Mod: CPTII,S$GLB,, | Performed by: REGISTERED NURSE

## 2024-11-13 PROCEDURE — 99396 PREV VISIT EST AGE 40-64: CPT | Mod: S$GLB,,, | Performed by: REGISTERED NURSE

## 2024-11-13 PROCEDURE — 80061 LIPID PANEL: CPT | Performed by: REGISTERED NURSE

## 2024-11-13 PROCEDURE — 3079F DIAST BP 80-89 MM HG: CPT | Mod: CPTII,S$GLB,, | Performed by: REGISTERED NURSE

## 2024-11-13 PROCEDURE — 83036 HEMOGLOBIN GLYCOSYLATED A1C: CPT | Performed by: REGISTERED NURSE

## 2024-11-13 PROCEDURE — 99999 PR PBB SHADOW E&M-EST. PATIENT-LVL III: CPT | Mod: PBBFAC,,, | Performed by: REGISTERED NURSE

## 2024-11-13 NOTE — PROGRESS NOTES
Joycelyn Wesley  MRN:  [id number]  54 y.o. female      SUBJECTIVE:     CHIEF COMPLAINT:     PREVENTATIVE HEALTH EXAM    HPI:    Joycelyn Wesley is here for her annual wellness exam, has fasted today for bloodwork.  I have reviewed the patient's medical history in detail and updated the computerized patient record.  History obtained from the patient.    Last visit w/ PCP: 11/6/2023  Last visit w/ me: 3/22/2023      HEALTHCARE MAINTENANCE:    COMPLETED:  Health Maintenance Topics with due status: Not Due       Topic Last Completion Date    TETANUS VACCINE 04/20/2015    Pap Smear 03/07/2022    Hemoglobin A1c (Diabetic Prevention Screening) 06/07/2023    Lipid Panel 06/07/2023    Colorectal Cancer Screening 01/31/2024    RSV Vaccine (Age 60+ and Pregnant patients) Not Due       DUE:  Health Maintenance Due   Topic Date Due    Shingles Vaccine (1 of 2) Never done    Mammogram  06/27/2024    Influenza Vaccine (1) 09/01/2024    COVID-19 Vaccine (1 - 2024-25 season) Never done         REVIEW OF SYSTEMS:  Review of Systems   Constitutional:  Negative for activity change, appetite change, chills, diaphoresis, fatigue, fever and unexpected weight change.   HENT:  Positive for tinnitus (reports chronic, bilateral). Negative for nasal congestion and mouth sores.    Eyes:  Negative for discharge and visual disturbance.   Respiratory:  Negative for cough, shortness of breath and wheezing.    Cardiovascular:  Negative for chest pain, palpitations and leg swelling.   Gastrointestinal: Negative.    Genitourinary: Negative.    Musculoskeletal: Negative.    Integumentary:  Negative for rash and mole/lesion.   Neurological:  Negative for vertigo, seizures, syncope, numbness and headaches.   Hematological: Negative.    Psychiatric/Behavioral:  Negative for depression and sleep disturbance. The patient is not nervous/anxious.    Breast: negative.        ALLERGIES:  Review of patient's allergies indicates:  No Known Allergies      CURRENT  PROBLEM LIST:  Patient Active Problem List   Diagnosis    Mild vitamin D deficiency    Low ferritin level    Hot flashes due to surgical menopause    Overweight with body mass index (BMI) of 25 to 25.9 in adult    Primary hypertension    Gastroesophageal reflux disease without esophagitis    Hypercholesterolemia       CURRENT MEDICATIONS:    Current Outpatient Medications:     ascorbic acid, vitamin C, (VITAMIN C) 1000 MG tablet, , Disp: , Rfl:     cranberry extract 500 mg Cap, , Disp: , Rfl:     progesterone (PROMETRIUM) 100 MG capsule, Take 100 mg by mouth once daily., Disp: , Rfl:       HISTORY:    PAST MEDICAL HISTORY:  Past Medical History:   Diagnosis Date    Fibroids     Hematuria, microscopic 2021    Mild vitamin D deficiency 2015    Panic attacks     Surgical menopause        PAST SURGICAL HISTORY:  Past Surgical History:   Procedure Laterality Date     SECTION, LOW TRANSVERSE      x 2    COLONOSCOPY N/A 2015    Procedure: COLONOSCOPY;  Surgeon: Salty Costello MD;  Location: [facility];  Service: Endoscopy;  Laterality: N/A;    COLONOSCOPY N/A 2024    Procedure: COLONOSCOPY;  Surgeon: Betsey Russo MD;  Location: [facility];  Service: Endoscopy;  Laterality: N/A;    ESOPHAGOGASTRODUODENOSCOPY N/A 2024    Procedure: EGD (ESOPHAGOGASTRODUODENOSCOPY);  Surgeon: Betsey Russo MD;  Location: [facility];  Service: Endoscopy;  Laterality: N/A;    HYSTERECTOMY  04/15/2019    Fibroids    TOTAL ABDOMINAL HYSTERECTOMY W/ BILATERAL SALPINGOOPHORECTOMY  04/15/2019    Due to fibroids    TOTAL ABDOMINAL HYSTERECTOMY W/ BILATERAL SALPINGOOPHORECTOMY  2019    fibroids    TUBAL LIGATION         FAMILY HISTORY:  Family History   Problem Relation Name Age of Onset    Cancer Mother          Breast cancer    Breast cancer Mother      Emphysema Father      Lung cancer Father      Panic disorder Brother      Cancer Maternal Aunt          Breast cancer    Breast cancer Maternal Aunt       "Cancer Maternal Grandmother          Colon cancer    Colon cancer Maternal Grandmother      Cancer Maternal Aunt          Breast cancer    No Known Problems Daughter      No Known Problems Son      No Known Problems Son      No Known Problems Sister      Heart disease Neg Hx      Diabetes Neg Hx      Stroke Neg Hx      Hypertension Neg Hx         SOCIAL HISTORY:  Social History     Socioeconomic History    Marital status:     Number of children: 3   Occupational History    Occupation: Homemaker   Tobacco Use    Smoking status: Never    Smokeless tobacco: Never   Substance and Sexual Activity    Alcohol use: No     Alcohol/week: 0.0 standard drinks of alcohol    Drug use: No    Sexual activity: Yes     Partners: Male     Birth control/protection: Surgical   Social History Narrative    She wears seatbelt.     Social Drivers of Health     Financial Resource Strain: Low Risk  (11/12/2024)    Overall Financial Resource Strain (CARDIA)     Difficulty of Paying Living Expenses: Not very hard   Food Insecurity: Food Insecurity Present (11/12/2024)    Hunger Vital Sign     Worried About Running Out of Food in the Last Year: Sometimes true     Ran Out of Food in the Last Year: Sometimes true   Physical Activity: Insufficiently Active (11/12/2024)    Exercise Vital Sign     Days of Exercise per Week: 3 days     Minutes of Exercise per Session: 20 min   Stress: Stress Concern Present (11/12/2024)    [employer] [place] [employer] - Occupational Stress Questionnaire     Feeling of Stress : To some extent   Housing Stability: Unknown (11/12/2024)    Housing Stability Vital Sign     Unable to Pay for Housing in the Last Year: No       OBJECTIVE:     VITAL SIGNS:  Vitals:    11/13/24 1253   BP: 122/84   Pulse: 69   Temp: 96.7 °F (35.9 °C)   TempSrc: Tympanic   SpO2: 100%   Weight: 73 kg (160 lb 15 oz)   Height: 5' 6" (1.676 m)       BP Readings from Last 4 Encounters:   11/13/24 122/84   10/23/24 136/88   02/06/24 " 134/82   01/31/24 (!) 141/82       Pulse Readings from Last 4 Encounters:   11/13/24 69   02/06/24 86   01/31/24 88   11/13/23 76       CURRENT BMI:   Body mass index is 25.98 kg/m².    Wt Readings from Last 4 Encounters:   11/13/24 73 kg (160 lb 15 oz)   10/23/24 74.4 kg (164 lb)   02/06/24 74.6 kg (164 lb 7.4 oz)   01/31/24 72.1 kg (159 lb)         PHYSICAL EXAM:  Physical Exam  HENT:      Nose: No epistaxis.         ~~~~~~~~~~~~~~~~~~~~~~~~~~~~~~~~~~~~~~~~~~~~~~~    LABS REVIEWED:    CBC:  Lab Results   Component Value Date    WBC 4.04 11/13/2023    RBC 3.97 (L) 11/13/2023    HGB 12.5 11/13/2023    HCT 37.9 11/13/2023    MCV 96 11/13/2023    MCH 31.5 (H) 11/13/2023    MCHC 33.0 11/13/2023    RDW 11.7 11/13/2023     11/13/2023    MPV 10.6 11/13/2023    GRAN 2.5 11/13/2023    GRAN 62.2 11/13/2023    LYMPH 1.1 11/13/2023    LYMPH 27.7 11/13/2023    MONO 0.3 11/13/2023    MONO 6.7 11/13/2023    EOS 0.1 11/13/2023    BASO 0.04 11/13/2023    EOSINOPHIL 2.2 11/13/2023    BASOPHIL 1.0 11/13/2023       CHEMISTRY:  Sodium   Date Value Ref Range Status   11/13/2023 139 136 - 145 mmol/L Final     Potassium   Date Value Ref Range Status   11/13/2023 3.8 3.5 - 5.1 mmol/L Final     Chloride   Date Value Ref Range Status   11/13/2023 103 95 - 110 mmol/L Final     CO2   Date Value Ref Range Status   11/13/2023 28 23 - 29 mmol/L Final     Glucose   Date Value Ref Range Status   11/13/2023 82 70 - 110 mg/dL Final     BUN   Date Value Ref Range Status   11/13/2023 8 6 - 20 mg/dL Final     Creatinine   Date Value Ref Range Status   11/13/2023 0.7 0.5 - 1.4 mg/dL Final     Calcium   Date Value Ref Range Status   11/13/2023 9.1 8.7 - 10.5 mg/dL Final     Total Protein   Date Value Ref Range Status   11/13/2023 7.5 6.0 - 8.4 g/dL Final     Albumin   Date Value Ref Range Status   11/13/2023 3.6 3.5 - 5.2 g/dL Final     Total Bilirubin   Date Value Ref Range Status   11/13/2023 0.5 0.1 - 1.0 mg/dL Final     Comment:     For infants  and newborns, interpretation of results should be based  on gestational age, weight and in agreement with clinical  observations.    Premature Infant recommended reference ranges:  Up to 24 hours.............<8.0 mg/dL  Up to 48 hours............<12.0 mg/dL  3-5 days..................<15.0 mg/dL  6-29 days.................<15.0 mg/dL       Alkaline Phosphatase   Date Value Ref Range Status   11/13/2023 50 (L) 55 - 135 U/L Final     AST   Date Value Ref Range Status   11/13/2023 15 10 - 40 U/L Final     ALT   Date Value Ref Range Status   11/13/2023 11 10 - 44 U/L Final     Anion Gap   Date Value Ref Range Status   11/13/2023 8 8 - 16 mmol/L Final     eGFR   Date Value Ref Range Status   11/13/2023 >60.0 >60 mL/min/1.73 m^2 Final       LIPID PANEL:  Lab Results   Component Value Date    CHOL 211 (H) 06/07/2023    CHOL 201 (H) 06/07/2022     Lab Results   Component Value Date    TRIG 61 06/07/2023    TRIG 55 06/07/2022     Lab Results   Component Value Date    HDL 59 06/07/2023    HDL 54 06/07/2022     Lab Results   Component Value Date    LDLCALC 139.8 06/07/2023    LDLCALC 136.0 06/07/2022       THYROID:  Lab Results   Component Value Date    TSH 0.806 06/07/2023       DIABETES:  Lab Results   Component Value Date    HGBA1C 4.2 06/07/2023         ASSESSMENT:     1. Preventative health care  -     CBC Auto Differential; Future; Expected date: 11/13/2024  -     Comprehensive Metabolic Panel; Future; Expected date: 11/13/2024  -     TSH; Future; Expected date: 11/13/2024  -     Lipid Panel; Future; Expected date: 11/13/2024  -     Hemoglobin A1C; Future; Expected date: 11/13/2024  -     VITAMIN B12; Future; Expected date: 11/13/2024  -     Vitamin D; Future; Expected date: 11/13/2024    2. Primary hypertension ----- chronic issue, stable and well-controlled off medication.  Monitor.  -     Comprehensive Metabolic Panel; Future; Expected date: 11/13/2024  -     TSH; Future; Expected date: 11/13/2024    3.  Hypercholesterolemia  -     Lipid Panel; Future; Expected date: 11/13/2024    4. Mild vitamin D deficiency  -     Vitamin D; Future; Expected date: 11/13/2024    5. Tinnitus of both ears ---- to ENT if needed  -     VITAMIN B12; Future; Expected date: 11/13/2024    6. Overweight with body mass index (BMI) of 25 to 25.9 in adult  -     CBC Auto Differential; Future; Expected date: 11/13/2024  -     Comprehensive Metabolic Panel; Future; Expected date: 11/13/2024  -     TSH; Future; Expected date: 11/13/2024  -     Lipid Panel; Future; Expected date: 11/13/2024  -     Hemoglobin A1C; Future; Expected date: 11/13/2024        PLAN:     Healthy dietary and lifestyle changes discussed.  Mammogram scheduled for 12/4/2024.  Refuses vaccines today.  Further rec pending lab results.  RTC as directed and/or prn.        SUMA Davison  [facility]       Patient Care Team:  Jenn Womack MD as PCP - General (Family Medicine)  Regina Gupta MD as Obstetrician (Obstetrics)      Future Appointments   Date Time Provider Department Center   12/4/2024  9:30 AM Regina Gupta MD [facility]   12/4/2024 10:20 AM LW  [id number] SCREEN [facility]

## 2024-11-14 ENCOUNTER — TELEPHONE (OUTPATIENT)
Dept: FAMILY MEDICINE | Facility: CLINIC | Age: 54
End: 2024-11-14
Payer: COMMERCIAL

## 2024-11-14 NOTE — TELEPHONE ENCOUNTER
----- Message from Nathalie sent at 11/14/2024 12:58 PM CST -----  Contact: Joycelyn Hirsch is needing a call back in regards to needing a referral for a hematology to see Dr. Yovana Chaudhry fax [phone]. Please give her a call back at [phone]

## 2024-11-14 NOTE — TELEPHONE ENCOUNTER
Called pt. And let her know that once Mrs. Carson reads her lab results I will give her a call back

## 2024-11-14 NOTE — TELEPHONE ENCOUNTER
Pt called requesting a hematology referral be put in. She wants to go to Dr. Yovana Chaudhry and she said their fax # is [phone]. Please advise.

## 2024-11-15 ENCOUNTER — TELEPHONE (OUTPATIENT)
Dept: FAMILY MEDICINE | Facility: CLINIC | Age: 54
End: 2024-11-15
Payer: COMMERCIAL

## 2024-11-15 NOTE — TELEPHONE ENCOUNTER
----- Message from Leonid sent at 11/15/2024 10:39 AM CST -----  Contact: patient  Type:  Patient Requesting Referral    Who Called:Joycelyn Wesley   Does the patient already have the specialty appointment scheduled?: no  If yes, what is the date of that appointment?: n/a  Referral to What Specialty: Hematology   Reason for Referral: elevated Blood levels   Does the patient want the referral with a specific physician?: Dr.Laura Chaudhry   [facility] the [facility]   Patient Requesting a Response?: yes   Would the patient rather a call back or a response via [facility]?  Either  Best Call Back Number: [phone]  Additional Information:

## 2024-11-18 ENCOUNTER — TELEPHONE (OUTPATIENT)
Dept: FAMILY MEDICINE | Facility: CLINIC | Age: 54
End: 2024-11-18
Payer: COMMERCIAL

## 2024-11-18 ENCOUNTER — PATIENT MESSAGE (OUTPATIENT)
Dept: FAMILY MEDICINE | Facility: CLINIC | Age: 54
End: 2024-11-18
Payer: COMMERCIAL

## 2024-11-18 DIAGNOSIS — R79.89 ELEVATED VITAMIN B12 LEVEL: Primary | ICD-10-CM

## 2024-11-18 NOTE — TELEPHONE ENCOUNTER
Pt stated she wanted a referral to hemo for high vitamin B levels. New fax #[phone], Dr. Thony Rodgers. Pt stated her ears are still ringing as well.

## 2024-11-19 ENCOUNTER — TELEPHONE (OUTPATIENT)
Dept: FAMILY MEDICINE | Facility: CLINIC | Age: 54
End: 2024-11-19
Payer: COMMERCIAL

## 2024-11-19 ENCOUNTER — HOSPITAL ENCOUNTER (EMERGENCY)
Facility: HOSPITAL | Age: 54
Discharge: HOME | End: 2024-11-19
Attending: EMERGENCY MEDICINE
Payer: COMMERCIAL

## 2024-11-19 VITALS
HEART RATE: 67 BPM | RESPIRATION RATE: 15 BRPM | DIASTOLIC BLOOD PRESSURE: 85 MMHG | OXYGEN SATURATION: 100 % | WEIGHT: 163.56 LBS | TEMPERATURE: 98 F | SYSTOLIC BLOOD PRESSURE: 131 MMHG | BODY MASS INDEX: 26.4 KG/M2

## 2024-11-19 DIAGNOSIS — I10 HTN (HYPERTENSION): ICD-10-CM

## 2024-11-19 DIAGNOSIS — R42 VERTIGO: ICD-10-CM

## 2024-11-19 DIAGNOSIS — R42 DIZZINESS: Primary | ICD-10-CM

## 2024-11-19 DIAGNOSIS — I10 HYPERTENSION, UNSPECIFIED TYPE: ICD-10-CM

## 2024-11-19 LAB
ALBUMIN SERPL BCP-MCNC: 3.9 G/DL (ref 3.5–5.2)
ALP SERPL-CCNC: 54 U/L (ref 40–150)
ALT SERPL W/O P-5'-P-CCNC: 8 U/L (ref 10–44)
ANION GAP SERPL CALC-SCNC: 12 MMOL/L (ref 8–16)
AST SERPL-CCNC: 14 U/L (ref 10–40)
BASOPHILS # BLD AUTO: 0.02 K/UL (ref 0–0.2)
BASOPHILS NFR BLD: 0.5 % (ref 0–1.9)
BILIRUB SERPL-MCNC: 0.5 MG/DL (ref 0.1–1)
BILIRUB UR QL STRIP: NEGATIVE
BNP SERPL-MCNC: 36 PG/ML (ref 0–99)
BUN SERPL-MCNC: 8 MG/DL (ref 6–20)
CALCIUM SERPL-MCNC: 9.2 MG/DL (ref 8.7–10.5)
CHLORIDE SERPL-SCNC: 104 MMOL/L (ref 95–110)
CLARITY UR: CLEAR
CO2 SERPL-SCNC: 22 MMOL/L (ref 23–29)
COLOR UR: COLORLESS
CREAT SERPL-MCNC: 0.8 MG/DL (ref 0.5–1.4)
DIFFERENTIAL METHOD BLD: ABNORMAL
EOSINOPHIL # BLD AUTO: 0.1 K/UL (ref 0–0.5)
EOSINOPHIL NFR BLD: 2.1 % (ref 0–8)
ERYTHROCYTE [DISTWIDTH] IN BLOOD BY AUTOMATED COUNT: 11.5 % (ref 11.5–14.5)
EST. GFR  (NO RACE VARIABLE): >60 ML/MIN/1.73 M^2
GLUCOSE SERPL-MCNC: 87 MG/DL (ref 70–110)
GLUCOSE UR QL STRIP: NEGATIVE
HCT VFR BLD AUTO: 38.9 % (ref 37–48.5)
HGB BLD-MCNC: 13.4 G/DL (ref 12–16)
HGB UR QL STRIP: ABNORMAL
IMM GRANULOCYTES # BLD AUTO: 0 K/UL (ref 0–0.04)
IMM GRANULOCYTES NFR BLD AUTO: 0 % (ref 0–0.5)
KETONES UR QL STRIP: NEGATIVE
LEUKOCYTE ESTERASE UR QL STRIP: ABNORMAL
LYMPHOCYTES # BLD AUTO: 1.2 K/UL (ref 1–4.8)
LYMPHOCYTES NFR BLD: 30.6 % (ref 18–48)
MCH RBC QN AUTO: 32.4 PG (ref 27–31)
MCHC RBC AUTO-ENTMCNC: 34.4 G/DL (ref 32–36)
MCV RBC AUTO: 94 FL (ref 82–98)
MICROSCOPIC COMMENT: ABNORMAL
MONOCYTES # BLD AUTO: 0.2 K/UL (ref 0.3–1)
MONOCYTES NFR BLD: 6.2 % (ref 4–15)
NEUTROPHILS # BLD AUTO: 2.3 K/UL (ref 1.8–7.7)
NEUTROPHILS NFR BLD: 60.6 % (ref 38–73)
NITRITE UR QL STRIP: NEGATIVE
NRBC BLD-RTO: 0 /100 WBC
PH UR STRIP: 7 [PH] (ref 5–8)
PLATELET # BLD AUTO: 200 K/UL (ref 150–450)
PMV BLD AUTO: 9.4 FL (ref 9.2–12.9)
POCT GLUCOSE: 87 MG/DL (ref 70–110)
POTASSIUM SERPL-SCNC: 3.6 MMOL/L (ref 3.5–5.1)
PROT SERPL-MCNC: 8.1 G/DL (ref 6–8.4)
PROT UR QL STRIP: NEGATIVE
RBC # BLD AUTO: 4.13 M/UL (ref 4–5.4)
RBC #/AREA URNS HPF: 6 /HPF (ref 0–4)
SODIUM SERPL-SCNC: 138 MMOL/L (ref 136–145)
SP GR UR STRIP: <1.005 (ref 1–1.03)
SQUAMOUS #/AREA URNS HPF: 1 /HPF
TROPONIN I SERPL DL<=0.01 NG/ML-MCNC: <0.006 NG/ML (ref 0–0.03)
URN SPEC COLLECT METH UR: ABNORMAL
UROBILINOGEN UR STRIP-ACNC: NEGATIVE EU/DL
WBC # BLD AUTO: 3.86 K/UL (ref 3.9–12.7)
WBC #/AREA URNS HPF: 4 /HPF (ref 0–5)

## 2024-11-19 PROCEDURE — 84484 ASSAY OF TROPONIN QUANT: CPT | Performed by: EMERGENCY MEDICINE

## 2024-11-19 PROCEDURE — 81000 URINALYSIS NONAUTO W/SCOPE: CPT | Performed by: EMERGENCY MEDICINE

## 2024-11-19 PROCEDURE — 99285 EMERGENCY DEPT VISIT HI MDM: CPT | Mod: 25

## 2024-11-19 PROCEDURE — 80053 COMPREHEN METABOLIC PANEL: CPT | Performed by: EMERGENCY MEDICINE

## 2024-11-19 PROCEDURE — 25000003 PHARM REV CODE 250: Performed by: EMERGENCY MEDICINE

## 2024-11-19 PROCEDURE — 85025 COMPLETE CBC W/AUTO DIFF WBC: CPT | Performed by: EMERGENCY MEDICINE

## 2024-11-19 PROCEDURE — 93010 ELECTROCARDIOGRAM REPORT: CPT | Mod: ,,, | Performed by: STUDENT IN AN ORGANIZED HEALTH CARE EDUCATION/TRAINING PROGRAM

## 2024-11-19 PROCEDURE — 83880 ASSAY OF NATRIURETIC PEPTIDE: CPT | Performed by: EMERGENCY MEDICINE

## 2024-11-19 PROCEDURE — 82962 GLUCOSE BLOOD TEST: CPT

## 2024-11-19 PROCEDURE — 93005 ELECTROCARDIOGRAM TRACING: CPT

## 2024-11-19 RX ORDER — MECLIZINE HYDROCHLORIDE 25 MG/1
25 TABLET ORAL
Status: COMPLETED | OUTPATIENT
Start: 2024-11-19 | End: 2024-11-19

## 2024-11-19 RX ORDER — CLONIDINE HYDROCHLORIDE 0.1 MG/1
0.1 TABLET ORAL 2 TIMES DAILY
Qty: 60 TABLET | Refills: 0 | Status: SHIPPED | OUTPATIENT
Start: 2024-11-19 | End: 2024-12-19

## 2024-11-19 RX ORDER — MECLIZINE HYDROCHLORIDE 25 MG/1
25 TABLET ORAL 3 TIMES DAILY PRN
Qty: 20 TABLET | Refills: 0 | Status: SHIPPED | OUTPATIENT
Start: 2024-11-19

## 2024-11-19 RX ORDER — CLONIDINE HYDROCHLORIDE 0.1 MG/1
0.1 TABLET ORAL
Status: COMPLETED | OUTPATIENT
Start: 2024-11-19 | End: 2024-11-19

## 2024-11-19 RX ADMIN — CLONIDINE HYDROCHLORIDE 0.1 MG: 0.1 TABLET ORAL at 09:11

## 2024-11-19 RX ADMIN — MECLIZINE HYDROCHLORIDE 25 MG: 25 TABLET ORAL at 09:11

## 2024-11-19 NOTE — TELEPHONE ENCOUNTER
----- Message from Candi sent at 11/19/2024 12:45 PM CST -----  Contact: Garo/  .Type:  Test Results    Who Called: Garo  Name of Test (Lab/Mammo/Etc): lab  Date of Test: 11/13  Ordering Provider: DYLLAN RANDOLPH STAFF  Where the test was performed: [facility]  Would the patient rather a call back or a response via [facility]? Call back  Best Call Back Number: [phone]  Additional Information:  pt is still having same problem        Thanks  ASHLEY

## 2024-11-19 NOTE — TELEPHONE ENCOUNTER
If at ER, they may not find a cause for her tinnitus.  She should see an ENT to have this checked further, looks like Dr. Rivers (ER doc) put in an ENT referral already.  Please fax her last few B12 results to Hematology.  Ref to Heme was faxed yesterday to Dr. Rodgers.

## 2024-11-19 NOTE — TELEPHONE ENCOUNTER
Pt called again stating that she is in the ER right now due to the ringing in her ears still happening. I called back and spoke with her  who asked what can she do to help with the ringing as well as what should she eat/stay away from regarding her B12 being high.   He also stated that they had spoken to someone at hematology and they said they needed her lab results sent over to them in order to get her scheduled there. Please advise.

## 2024-11-19 NOTE — ED PROVIDER NOTES
SCRIBE #1 NOTE: I, Rosemarysuraj Branham, am scribing for, and in the presence of, Justin Rivers MD. I have scribed the entire note.       History     Chief Complaint   Patient presents with    Dizziness     Pt. Reports a ringing in her ears, dizziness and cotton mouth for the past week. She also reports high Bps at home. No hx. Of high BP     Review of patient's allergies indicates:  No Known Allergies      History of Present Illness     HPI    2024, 10:22 AM  History obtained from the patient      History of Present Illness: Joycelyn Wesley is a 54 y.o. female patient with a PMHx of panic attacks, vitamin D deficiency, and fibroids who presents to the Emergency Department for evaluation of fatigue which onset gradually 4 days ago. Pt reports a ringing/hissing sound in her ears that worsens at night. She mentions feeling like passing out upon awakening today. Pt notes she had a headache a few days ago and took tylenol; shortly after taking her BP was elevated. Pt visited her PCP last Friday for labs and states her B12 was high. Symptoms are constant and moderate in severity. No mitigating or exacerbating factors reported. Associated sxs include anxiety. Patient denies any dizziness, N/V, and all other sxs at this time. No prior Tx. No further complaints or concerns at this time.       Arrival mode: Personal vehicle      PCP: Jenn Womack MD        Past Medical History:  Past Medical History:   Diagnosis Date    Fibroids     Hematuria, microscopic 2021    Mild vitamin D deficiency 2015    Panic attacks     Surgical menopause        Past Surgical History:  Past Surgical History:   Procedure Laterality Date     SECTION, LOW TRANSVERSE      x 2    COLONOSCOPY N/A 2015    Procedure: COLONOSCOPY;  Surgeon: Salty Costello MD;  Location: [facility] ENDO;  Service: Endoscopy;  Laterality: N/A;    COLONOSCOPY N/A 2024    Procedure: COLONOSCOPY;  Surgeon: Betsey Russo MD;  Location: [facility]  ENDO;  Service: Endoscopy;  Laterality: N/A;    ESOPHAGOGASTRODUODENOSCOPY N/A 1/31/2024    Procedure: EGD (ESOPHAGOGASTRODUODENOSCOPY);  Surgeon: Betsey Russo MD;  Location: [facility];  Service: Endoscopy;  Laterality: N/A;    HYSTERECTOMY  04/15/2019    Fibroids    TOTAL ABDOMINAL HYSTERECTOMY W/ BILATERAL SALPINGOOPHORECTOMY  04/15/2019    Due to fibroids    TOTAL ABDOMINAL HYSTERECTOMY W/ BILATERAL SALPINGOOPHORECTOMY  04/2019    fibroids    TUBAL LIGATION           Family History:  Family History   Problem Relation Name Age of Onset    Cancer Mother          Breast cancer    Breast cancer Mother      Emphysema Father      Lung cancer Father      Panic disorder Brother      Cancer Maternal Aunt          Breast cancer    Breast cancer Maternal Aunt      Cancer Maternal Grandmother          Colon cancer    Colon cancer Maternal Grandmother      Cancer Maternal Aunt          Breast cancer    No Known Problems Daughter      No Known Problems Son      No Known Problems Son      No Known Problems Sister      Heart disease Neg Hx      Diabetes Neg Hx      Stroke Neg Hx      Hypertension Neg Hx         Social History:  Social History     Tobacco Use    Smoking status: Never    Smokeless tobacco: Never   Substance and Sexual Activity    Alcohol use: No     Alcohol/week: 0.0 standard drinks of alcohol    Drug use: No    Sexual activity: Yes     Partners: Male     Birth control/protection: Surgical        Review of Systems     Review of Systems   Constitutional:  Positive for fatigue. Negative for fever.   HENT:  Positive for tinnitus. Negative for sore throat.    Respiratory:  Negative for shortness of breath.    Cardiovascular:  Negative for chest pain.   Gastrointestinal:  Negative for nausea and vomiting.   Genitourinary:  Negative for dysuria.   Musculoskeletal:  Negative for back pain.   Skin:  Negative for rash.   Neurological:  Negative for dizziness and weakness.   Hematological:  Does not bruise/bleed  easily.   Psychiatric/Behavioral:  The patient is nervous/anxious.    All other systems reviewed and are negative.       Physical Exam     Initial Vitals [11/19/24 0926]   BP Pulse Resp Temp SpO2   (!) 155/101 88 16 97.9 °F (36.6 °C) 100 %      MAP       --          Physical Exam  Nursing Notes and Vital Signs Reviewed.  Constitutional: Patient is in no acute distress. Well-developed and well-nourished.  Head: Atraumatic. Normocephalic.  Eyes: PERRL. EOM intact. Conjunctivae are not pale. No scleral icterus.  ENT: Mucous membranes are moist. Oropharynx is clear and symmetric.    Neck: Supple. Full ROM. No lymphadenopathy.  Cardiovascular: Regular rate. Regular rhythm. No murmurs, rubs, or gallops. Distal pulses are 2+ and symmetric.  Pulmonary/Chest: No respiratory distress. Clear to auscultation bilaterally. No wheezing or rales.  Abdominal: Soft and non-distended.  There is no tenderness.  No rebound, guarding, or rigidity. Good bowel sounds.  Genitourinary: No CVA tenderness.  Musculoskeletal: Moves all extremities. No obvious deformities. No edema. No calf tenderness.  Skin: Warm and dry.  Neurological:  Alert, awake, and appropriate.  Normal speech.  No acute focal neurological deficits are appreciated.  Psychiatric: Normal affect. Good eye contact. Appropriate in content.     ED Course   Procedures  ED Vital Signs:  Vitals:    11/19/24 0926 11/19/24 0930 11/19/24 0945 11/19/24 1000   BP: (!) 155/101  (!) 163/103 (!) 163/112   Pulse: 88 94 94 86   Resp: 16  20 20   Temp: 97.9 °F (36.6 °C)  97.9 °F (36.6 °C)    TempSrc: Oral  Oral    SpO2: 100%  100% 100%   Weight: 74.2 kg (163 lb 9.3 oz)       11/19/24 1004 11/19/24 1006 11/19/24 1008 11/19/24 1015   BP: (!) 161/93 (!) 144/90 (!) 150/94 (!) 150/94   Pulse: 82 80 80 88   Resp:    20   Temp:       TempSrc:       SpO2:    100%   Weight:        11/19/24 1120 11/19/24 1220 11/19/24 1300 11/19/24 1320   BP: 131/82 (!) 140/94 (!) 140/94 131/85   Pulse: 70 65 70 67    Resp: 17 13 18 15   Temp:       TempSrc:       SpO2: 100% 100% 100% 100%   Weight:           Abnormal Lab Results:  Labs Reviewed   CBC W/ AUTO DIFFERENTIAL - Abnormal       Result Value    WBC 3.86 (*)     RBC 4.13      Hemoglobin 13.4      Hematocrit 38.9      MCV 94      MCH 32.4 (*)     MCHC 34.4      RDW 11.5      Platelets 200      MPV 9.4      Immature Granulocytes 0.0      Gran # (ANC) 2.3      Immature Grans (Abs) 0.00      Lymph # 1.2      Mono # 0.2 (*)     Eos # 0.1      Baso # 0.02      nRBC 0      Gran % 60.6      Lymph % 30.6      Mono % 6.2      Eosinophil % 2.1      Basophil % 0.5      Differential Method Automated     COMPREHENSIVE METABOLIC PANEL - Abnormal    Sodium 138      Potassium 3.6      Chloride 104      CO2 22 (*)     Glucose 87      BUN 8      Creatinine 0.8      Calcium 9.2      Total Protein 8.1      Albumin 3.9      Total Bilirubin 0.5      Alkaline Phosphatase 54      AST 14      ALT 8 (*)     eGFR >60      Anion Gap 12     URINALYSIS, REFLEX TO URINE CULTURE - Abnormal    Specimen UA Urine, Clean Catch      Color, UA Colorless (*)     Appearance, UA Clear      pH, UA 7.0      Specific Gravity, UA <1.005 (*)     Protein, UA Negative      Glucose, UA Negative      Ketones, UA Negative      Bilirubin (UA) Negative      Occult Blood UA 1+ (*)     Nitrite, UA Negative      Urobilinogen, UA Negative      Leukocytes, UA Trace (*)     Narrative:     Specimen Source->Urine   URINALYSIS MICROSCOPIC - Abnormal    RBC, UA 6 (*)     WBC, UA 4      Squam Epithel, UA 1      Microscopic Comment SEE COMMENT      Narrative:     Specimen Source->Urine   TROPONIN I    Troponin I <0.006     B-TYPE NATRIURETIC PEPTIDE    BNP 36     POCT GLUCOSE    POCT Glucose 87          All Lab Results:  Results for orders placed or performed during the hospital encounter of 11/19/24   CBC auto differential    Collection Time: 11/19/24  9:47 AM   Result Value Ref Range    WBC 3.86 (L) 3.90 - 12.70 K/uL    RBC 4.13  4.00 - 5.40 M/uL    Hemoglobin 13.4 12.0 - 16.0 g/dL    Hematocrit 38.9 37.0 - 48.5 %    MCV 94 82 - 98 fL    MCH 32.4 (H) 27.0 - 31.0 pg    MCHC 34.4 32.0 - 36.0 g/dL    RDW 11.5 11.5 - 14.5 %    Platelets 200 150 - 450 K/uL    MPV 9.4 9.2 - 12.9 fL    Immature Granulocytes 0.0 0.0 - 0.5 %    Gran # (ANC) 2.3 1.8 - 7.7 K/uL    Immature Grans (Abs) 0.00 0.00 - 0.04 K/uL    Lymph # 1.2 1.0 - 4.8 K/uL    Mono # 0.2 (L) 0.3 - 1.0 K/uL    Eos # 0.1 0.0 - 0.5 K/uL    Baso # 0.02 0.00 - 0.20 K/uL    nRBC 0 0 /100 WBC    Gran % 60.6 38.0 - 73.0 %    Lymph % 30.6 18.0 - 48.0 %    Mono % 6.2 4.0 - 15.0 %    Eosinophil % 2.1 0.0 - 8.0 %    Basophil % 0.5 0.0 - 1.9 %    Differential Method Automated    Comprehensive metabolic panel    Collection Time: 11/19/24  9:47 AM   Result Value Ref Range    Sodium 138 136 - 145 mmol/L    Potassium 3.6 3.5 - 5.1 mmol/L    Chloride 104 95 - 110 mmol/L    CO2 22 (L) 23 - 29 mmol/L    Glucose 87 70 - 110 mg/dL    BUN 8 6 - 20 mg/dL    Creatinine 0.8 0.5 - 1.4 mg/dL    Calcium 9.2 8.7 - 10.5 mg/dL    Total Protein 8.1 6.0 - 8.4 g/dL    Albumin 3.9 3.5 - 5.2 g/dL    Total Bilirubin 0.5 0.1 - 1.0 mg/dL    Alkaline Phosphatase 54 40 - 150 U/L    AST 14 10 - 40 U/L    ALT 8 (L) 10 - 44 U/L    eGFR >60 >60 mL/min/1.73 m^2    Anion Gap 12 8 - 16 mmol/L   Troponin I    Collection Time: 11/19/24  9:47 AM   Result Value Ref Range    Troponin I <0.006 0.000 - 0.026 ng/mL   B-Type natriuretic peptide (BNP)    Collection Time: 11/19/24  9:47 AM   Result Value Ref Range    BNP 36 0 - 99 pg/mL   Urinalysis, Reflex to Urine Culture Urine, Clean Catch    Collection Time: 11/19/24  9:59 AM    Specimen: Urine   Result Value Ref Range    Specimen UA Urine, Clean Catch     Color, UA Colorless (A) Yellow, Straw, Sofya    Appearance, UA Clear Clear    pH, UA 7.0 5.0 - 8.0    Specific Gravity, UA <1.005 (A) 1.005 - 1.030    Protein, UA Negative Negative    Glucose, UA Negative Negative    Ketones, UA Negative  Negative    Bilirubin (UA) Negative Negative    Occult Blood UA 1+ (A) Negative    Nitrite, UA Negative Negative    Urobilinogen, UA Negative <2.0 EU/dL    Leukocytes, UA Trace (A) Negative   Urinalysis Microscopic    Collection Time: 11/19/24  9:59 AM   Result Value Ref Range    RBC, UA 6 (H) 0 - 4 /hpf    WBC, UA 4 0 - 5 /hpf    Squam Epithel, UA 1 /hpf    Microscopic Comment SEE COMMENT    EKG 12-lead    Collection Time: 11/19/24 10:02 AM   Result Value Ref Range    QRS Duration 82 ms    [facility] QTC Calculation 425 ms   POCT glucose    Collection Time: 11/19/24 10:03 AM   Result Value Ref Range    POCT Glucose 87 70 - 110 mg/dL         Imaging Results:  Imaging Results              CT Head Without Contrast (Final result)  Result time 11/19/24 10:56:07      Final result by Johan Larose MD (11/19/24 10:56:07)                   Impression:      No acute intracranial CT abnormality.    All CT scans at this facility are performed  using dose modulation techniques as appropriate to performed exam including the following:  automated exposure control; adjustment of mA and/or kV according to the patients size (this includes techniques or standardized protocols for targeted exams where dose is matched to indication/reason for exam: i.e. extremities or head);  iterative reconstruction technique.      Electronically signed by: Johan Larose  Date:    11/19/2024  Time:    10:56               Narrative:    EXAMINATION:  CT HEAD WITHOUT CONTRAST    CLINICAL HISTORY:  Dizziness, persistent/recurrent, cardiac or vascular cause suspected;    TECHNIQUE:  Low dose axial CT images obtained throughout the head without intravenous contrast. Sagittal and coronal reconstructions were performed.    COMPARISON:  Multiple    FINDINGS:  Intracranial compartment:    Ventricles and sulci are normal in size for age without evidence of hydrocephalus. No extra-axial blood or fluid collections.    The brain parenchyma appears normal. No  parenchymal mass, hemorrhage, edema or major vascular distribution infarct.    Skull/extracranial contents (limited evaluation): No fracture. Mastoid air cells and paranasal sinuses are essentially clear.                                       X-Ray Chest AP Portable (Final result)  Result time 11/19/24 10:06:08      Final result by Randy Loaiza MD (11/19/24 10:06:08)                   Impression:      No acute process seen.      Electronically signed by: Randy Loaiza MD  Date:    11/19/2024  Time:    10:06               Narrative:    EXAMINATION:  XR CHEST AP PORTABLE    CLINICAL HISTORY:  Chest Pain;    FINDINGS:  Single view of the chest.  Comparison 12/09/2014    Cardiac silhouette is normal.  The lungs demonstrate no evidence of active disease.  No evidence of pleural effusion or pneumothorax.  Bones appear intact.                                       The EKG was ordered, reviewed, and independently interpreted by the ED provider.  Interpretation time: 10:02  Rate: 82 BPM  Rhythm: normal sinus rhythm  Interpretation: Normal ECG. No STEMI.             The Emergency Provider reviewed the vital signs and test results, which are outlined above.     ED Discussion       1:12 PM: Reassessed pt at this time.  Discussed with pt all pertinent ED information and results. Discussed pt dx and plan of tx. Gave pt all f/u and return to the ED instructions. All questions and concerns were addressed at this time. Pt expresses understanding of information and instructions, and is comfortable with plan to discharge. Pt is stable for discharge.    I discussed with patient and/or family/caretaker that evaluation in the ED does not suggest any emergent or life threatening medical conditions requiring immediate intervention beyond what was provided in the ED, and I believe patient is safe for discharge.  Regardless, an unremarkable evaluation in the ED does not preclude the development or presence of a serious of life  threatening condition. As such, patient was instructed to return immediately for any worsening or change in current symptoms.      ED Course as of 11/21/24 0711   Tue Nov 19, 2024   1155 POCT Glucose: 87 [VINCE]   1203 WBC(!): 3.86 [VINCE]   1203 Hemoglobin: 13.4 [VINCE]   1203 Hematocrit: 38.9 [VINCE]   1203 Sodium: 138 [VINCE]   1203 BUN: 8 [VINCE]   1203 Creatinine: 0.8 [VINCE]   1203 Troponin I: <0.006 [VINCE]   1203 BNP: 36 [VINCE]   1204 CT Head Without Contrast  No obvious intracranial hemorrhage. [VINCE]   1204 X-Ray Chest AP Portable  No obvious consolidation or pulmonary edema noted [VINCE]      ED Course User Index  [VINCE] Justin Rivers MD     Medical Decision Making  Problems Addressed:  Dizziness: acute illness or injury that poses a threat to life or bodily functions  HTN (hypertension): chronic illness or injury with exacerbation, progression, or side effects of treatment  Hypertension, unspecified type: chronic illness or injury with exacerbation, progression, or side effects of treatment  Vertigo: acute illness or injury that poses a threat to life or bodily functions    Amount and/or Complexity of Data Reviewed  Independent Historian: spouse     Details: Patient's son and spouse at bedside.   Labs: ordered. Decision-making details documented in ED Course.  Radiology: ordered. Decision-making details documented in ED Course.  ECG/medicine tests: ordered and independent interpretation performed. Decision-making details documented in ED Course.     Details: Normal sinus rhythm at 82 bpm. Normal ECG.     Risk  Prescription drug management.  Risk Details: This patient comes emergency department today complaining about some dizziness/vertigo with tinnitus.  She is hypertensive today 150s over 100.  I review her blood pressures and she has multiple significantly elevated blood pressures however I do see that she has some normal blood pressures in your office.  I am starting her on low-dose clonidine 0.1 p.o. b.i.d. to see if her symptoms  improve.  She also is getting some Antivert p.r.n. and I placed a referral to ENT for further evaluation of her vertigo.  It is unclear if her symptoms are coming from vertigo or symptomatic hypertension.     Differential diagnosis includes ACS, hypertensive urgency, hypertensive emergency, central vertigo, peripheral vertigo, hypoglycemia, hypovolemia etc.                ED Medication(s):  Medications   cloNIDine tablet 0.1 mg (0.1 mg Oral Given 11/19/24 0957)   meclizine tablet 25 mg (25 mg Oral Given 11/19/24 0958)       Discharge Medication List as of 11/19/2024  1:10 PM        START taking these medications    Details   cloNIDine (CATAPRES) 0.1 MG tablet Take 1 tablet (0.1 mg total) by mouth 2 (two) times daily., Starting Tue 11/19/2024, Until Thu 12/19/2024, Normal      meclizine (ANTIVERT) 25 mg tablet Take 1 tablet (25 mg total) by mouth 3 (three) times daily as needed., Starting Tue 11/19/2024, Normal              Follow-up Information       Jenn Womack MD. Schedule an appointment as soon as possible for a visit in 3 days.    Specialty: Family Medicine  Contact information:  [address] [facility] [address] [phone]                                 Scribe Attestation:   Scribe #1: I performed the above scribed service and the documentation accurately describes the services I performed. I attest to the accuracy of the note.     Attending:   Physician Attestation Statement for Scribe #1: I, Justin Rivers MD, personally performed the services described in this documentation, as scribed by Rosemary Branham, in my presence, and it is both accurate and complete.           Clinical Impression       ICD-10-CM ICD-9-CM   1. Dizziness  R42 780.4   2. HTN (hypertension)  I10 401.9   3. Vertigo  R42 780.4   4. Hypertension, unspecified type  I10 401.9       Disposition:   Disposition: Discharged  Condition: Stable         Justin Rivers MD  11/21/24 3939

## 2024-11-22 ENCOUNTER — OFFICE VISIT (OUTPATIENT)
Dept: FAMILY MEDICINE | Facility: CLINIC | Age: 54
End: 2024-11-22
Payer: COMMERCIAL

## 2024-11-22 ENCOUNTER — TELEPHONE (OUTPATIENT)
Dept: FAMILY MEDICINE | Facility: CLINIC | Age: 54
End: 2024-11-22
Payer: COMMERCIAL

## 2024-11-22 VITALS
BODY MASS INDEX: 25.94 KG/M2 | WEIGHT: 161.38 LBS | OXYGEN SATURATION: 97 % | TEMPERATURE: 99 F | HEIGHT: 66 IN | HEART RATE: 76 BPM | DIASTOLIC BLOOD PRESSURE: 80 MMHG | SYSTOLIC BLOOD PRESSURE: 130 MMHG

## 2024-11-22 DIAGNOSIS — I10 PRIMARY HYPERTENSION: ICD-10-CM

## 2024-11-22 DIAGNOSIS — Z86.19 HISTORY OF HELICOBACTER PYLORI INFECTION: ICD-10-CM

## 2024-11-22 DIAGNOSIS — E78.00 HYPERCHOLESTEROLEMIA: ICD-10-CM

## 2024-11-22 DIAGNOSIS — R10.13 DYSPEPSIA: ICD-10-CM

## 2024-11-22 DIAGNOSIS — F41.1 GAD (GENERALIZED ANXIETY DISORDER): ICD-10-CM

## 2024-11-22 DIAGNOSIS — R10.32 LEFT LOWER QUADRANT ABDOMINAL PAIN: Primary | ICD-10-CM

## 2024-11-22 LAB
OHS QRS DURATION: 82 MS
OHS QTC CALCULATION: 425 MS

## 2024-11-22 PROCEDURE — 99999 PR PBB SHADOW E&M-EST. PATIENT-LVL III: CPT | Mod: PBBFAC,,, | Performed by: FAMILY MEDICINE

## 2024-11-22 RX ORDER — ALPRAZOLAM 0.25 MG/1
.25-.5 TABLET ORAL 3 TIMES DAILY PRN
Qty: 30 TABLET | Refills: 0 | Status: SHIPPED | OUTPATIENT
Start: 2024-11-22 | End: 2024-12-22

## 2024-11-22 RX ORDER — PANTOPRAZOLE SODIUM 40 MG/1
40 TABLET, DELAYED RELEASE ORAL
Qty: 30 TABLET | Refills: 2 | Status: SHIPPED | OUTPATIENT
Start: 2024-11-22

## 2024-11-22 NOTE — TELEPHONE ENCOUNTER
Spoke with pt via telephone regarding appointment request. Pt states she spoke with someone this morning from call center and was scheduled with Dr. Weinstein.

## 2024-11-22 NOTE — PROGRESS NOTES
CHIEF COMPLAINT:  This is a 54-year-old female complaining of abdominal pain.    SUBJECTIVE:  The patient was recently seen for ringing in the ears, fatigue and elevated blood pressure readings.  She eventually went to the emergency room because of continued elevation of blood pressure readings.  ED workup was negative except for elevated vitamin B12 level.  The patient was given clonidine to be taken as needed for elevation of blood pressure.    Last night, patient began having burning in her left lower quadrant which extended to epigastrium and up into throat.  This type of abdominal pain has been chronic and intermittent in nature and she has had extensive GI workup in the past including EGD, colonoscopy and CT scan of the abdomen and pelvis.  She was diagnosed with H pylori infection 1 year ago and treated with improvement of symptoms for awhile.  Patient is concerned that she has reoccurrence of H pylori infection.  After experiencing the pain last night, her blood pressure shot up and she became nervous and was unable to sleep.  Patient denies fever, chills, nausea, vomiting, constipation, diarrhea, blood in stool or melena.    Patient admits to being treated for anxiety in the past.  She took alprazolam 1 mg.  This medication was discontinued after she was started on bioidentical hormones.  Patient receives hormonal pellets under the skin.  She does not know if medication is compounded with vitamin B12.    Patient has a history of hyperlipidemia and vitamin-D deficiency.    ROS:  GENERAL: Patient denies fever, chills, night sweats.  Patient denies weight gain or loss. Patient denies anorexia, weakness or swollen glands.  Positive for fatigue.  SKIN: Patient denies rash.  HEENT: Patient denies sore throat, ear pain, hearing loss, nasal congestion, or runny nose. Patient denies visual disturbance, eye irritation or discharge.  Positive for tinnitus.  LUNGS: Patient denies cough, wheeze or  hemoptysis.  CARDIOVASCULAR: Patient denies chest pain, shortness of breath, palpitations, syncope or lower extremity edema.  GI: Patient denies nausea, vomiting, diarrhea, constipation, blood in stool or melena.  Positive for abdominal pain and dyspepsia.  GENITOURINARY: Patient denies pelvic pain, vaginal discharge, itch or odor. Patient denies irregular vaginal bleeding.  Patient denies dysuria, frequency, hematuria, nocturia, urgency or incontinence.  BREASTS: Patient denies breast pain, mass or nipple discharge.  MUSCULOSKELETAL: Patient denies joint pain, swelling, redness or warmth.  NEUROLOGIC: Patient denies headache, vertigo, paresthesias, weakness in limb, dysarthria, dysphagia or abnormality of gait.  PSYCHIATRIC: Patient denies depression or memory loss.  Positive for anxiety.    OBJECTIVE:   GENERAL: Well-developed well-nourished black female alert and oriented x3 in no acute distress.  Memory, judgment and cognition without deficit.  Anxious affect.  No hallucinations, delusions or flight of ideas.  Accompanied by spouse.  SKIN: Clear without rash.  Normal color and tone.  HEENT: Eyes: Clear conjunctivae.  No scleral icterus.   NECK: Supple, normal range of motion.  No lymphadenopathy.  No masses or enlarged thyroid.  No JVD.    LUNGS: Clear to auscultation.  Normal respiratory effort.  CARDIOVASCULAR: Regular rhythm, normal S1, S2 without murmur, gallop or rub.  BACK: No CVA or spinal tenderness.  ABDOMEN: Normal appearance.  Active bowel sounds.  Soft, nontender without mass or organomegaly.  No rebound or guarding.  EXTREMITIES: Without cyanosis, clubbing or edema.  Distal pulses 2+ and equal.  Normal range of motion in all extremities.  No joint effusion, erythema or warmth.  NEUROLOGIC:   Motor strength equal bilaterally.  Sensation normal to touch. Gait without abnormality.  No tremor.  Negative cerebellar signs.    ASSESSMENT:  1. Left lower quadrant abdominal pain    2. Dyspepsia    3. History  of Helicobacter pylori infection    4. RUIZ (generalized anxiety disorder)    5. Primary hypertension    6. Hypercholesterolemia      PLAN:   1. H pylori antigen stool.  2. Pantoprazole 40 mg 30-60 minutes prior to breakfast.    3. Alprazolam 0.25 mg 1-2 tablets as needed for anxiety or sleeplessness.    4. Follow-up once results reviewed.      35 minutes of total time spent on the encounter, which includes face to face time and non-face to face time preparing to see the patient (eg, review of tests), Obtaining and/or reviewing separately obtained history, Documenting clinical information in the electronic or other health record, Independently interpreting results (not separately reported) and communicating results to the patient/family/caregiver, or Care coordination (not separately reported).     This note is generated with speech recognition software and is subject to transcription error and sound alike phrases that may be missed by proofreading.

## 2024-11-22 NOTE — TELEPHONE ENCOUNTER
----- Message from Sofya sent at 11/22/2024  7:08 AM CST -----  Type:  Needs Medical Advice    Who Called: pt  Symptoms (please be specific): h pylori symptoms   How long has patient had these symptoms:  few weeks  Pharmacy name and phone #:    [id number] - [place] - [address] [facility]  [address]  [place] [address]  Phone: [phone] Fax: [phone]  Would the patient rather a call back or a response via [facility]? call  Best Call Back Number: [phone]  Additional Information: requesting to get tested

## 2024-11-25 ENCOUNTER — TELEPHONE (OUTPATIENT)
Dept: FAMILY MEDICINE | Facility: CLINIC | Age: 54
End: 2024-11-25
Payer: COMMERCIAL

## 2024-11-25 ENCOUNTER — PATIENT MESSAGE (OUTPATIENT)
Dept: OTOLARYNGOLOGY | Facility: CLINIC | Age: 54
End: 2024-11-25
Payer: COMMERCIAL

## 2024-11-25 NOTE — TELEPHONE ENCOUNTER
----- Message from Alli Amaya NP sent at 11/24/2024  8:50 PM CST -----  Labs:  Chronically elevated B12 level, has already been addressed.  Total cholesterol borderline high --- treat with proper diet and regular exercise, reduce weight as needed.    Follow-up with PCP in 6 months.

## 2024-11-25 NOTE — TELEPHONE ENCOUNTER
Patient contacted and her results information were given to her. Patient verbally understood the information given. Patient 6mth appt has been scheduled.

## 2024-11-25 NOTE — TELEPHONE ENCOUNTER
----- Message from Ann sent at 11/25/2024  8:54 AM CST -----  Type:  Patient Returning Call    Who Called:pt   Who Left Message for Patient:Angelica  Does the patient know what this is regarding?:results   Would the patient rather a call back or a response via [facility]? Call   Best Call Back Number: [phone]  Additional Information:

## 2024-11-26 ENCOUNTER — LAB VISIT (OUTPATIENT)
Dept: LAB | Facility: HOSPITAL | Age: 54
End: 2024-11-26
Attending: FAMILY MEDICINE
Payer: COMMERCIAL

## 2024-11-26 DIAGNOSIS — R10.32 LEFT LOWER QUADRANT ABDOMINAL PAIN: ICD-10-CM

## 2024-11-26 PROCEDURE — 87338 HPYLORI STOOL AG IA: CPT | Performed by: FAMILY MEDICINE

## 2024-12-03 ENCOUNTER — TELEPHONE (OUTPATIENT)
Dept: FAMILY MEDICINE | Facility: CLINIC | Age: 54
End: 2024-12-03
Payer: COMMERCIAL

## 2024-12-03 LAB — H PYLORI AG STL QL IA: NOT DETECTED

## 2024-12-03 NOTE — TELEPHONE ENCOUNTER
----- Message from Vale sent at 12/3/2024  4:03 PM CST -----  Regarding: Test Results  Contact: Joycelyn  .Type:  Test Results    Who Called: Joycelyn   Name of Test (Lab/Mammo/Etc):   Date of Test:  11/26/2024   Ordering Provider:  REJI Aguilar   Where the test was performed: [facility]  Would the patient rather a call back or a response via [facility]? call  Best Call Back Number:  [phone]  Additional Information:   Please call the patient to discuss the test results today.

## 2024-12-15 ENCOUNTER — HOSPITAL ENCOUNTER (EMERGENCY)
Facility: HOSPITAL | Age: 54
Discharge: HOME | End: 2024-12-15
Attending: EMERGENCY MEDICINE
Payer: COMMERCIAL

## 2024-12-15 VITALS
DIASTOLIC BLOOD PRESSURE: 82 MMHG | OXYGEN SATURATION: 99 % | HEIGHT: 66 IN | HEART RATE: 64 BPM | SYSTOLIC BLOOD PRESSURE: 119 MMHG | TEMPERATURE: 98 F | RESPIRATION RATE: 13 BRPM | BODY MASS INDEX: 28.14 KG/M2 | WEIGHT: 175.06 LBS

## 2024-12-15 DIAGNOSIS — R06.02 SHORTNESS OF BREATH: ICD-10-CM

## 2024-12-15 DIAGNOSIS — R06.00 DYSPNEA: ICD-10-CM

## 2024-12-15 DIAGNOSIS — F41.9 ANXIETY: Primary | ICD-10-CM

## 2024-12-15 LAB
ALBUMIN SERPL BCP-MCNC: 3.6 G/DL (ref 3.5–5.2)
ALP SERPL-CCNC: 55 U/L (ref 40–150)
ALT SERPL W/O P-5'-P-CCNC: 10 U/L (ref 10–44)
ANION GAP SERPL CALC-SCNC: 9 MMOL/L (ref 8–16)
AST SERPL-CCNC: 14 U/L (ref 10–40)
BASOPHILS # BLD AUTO: 0.02 K/UL (ref 0–0.2)
BASOPHILS NFR BLD: 0.3 % (ref 0–1.9)
BILIRUB SERPL-MCNC: 0.2 MG/DL (ref 0.1–1)
BILIRUB UR QL STRIP: NEGATIVE
BNP SERPL-MCNC: 26 PG/ML (ref 0–99)
BUN SERPL-MCNC: 11 MG/DL (ref 6–20)
CALCIUM SERPL-MCNC: 9 MG/DL (ref 8.7–10.5)
CHLORIDE SERPL-SCNC: 104 MMOL/L (ref 95–110)
CLARITY UR: CLEAR
CO2 SERPL-SCNC: 26 MMOL/L (ref 23–29)
COLOR UR: COLORLESS
CREAT SERPL-MCNC: 0.8 MG/DL (ref 0.5–1.4)
DIFFERENTIAL METHOD BLD: ABNORMAL
EOSINOPHIL # BLD AUTO: 0.1 K/UL (ref 0–0.5)
EOSINOPHIL NFR BLD: 1.9 % (ref 0–8)
ERYTHROCYTE [DISTWIDTH] IN BLOOD BY AUTOMATED COUNT: 11.8 % (ref 11.5–14.5)
EST. GFR  (NO RACE VARIABLE): >60 ML/MIN/1.73 M^2
GLUCOSE SERPL-MCNC: 102 MG/DL (ref 70–110)
GLUCOSE UR QL STRIP: NEGATIVE
HCT VFR BLD AUTO: 34.9 % (ref 37–48.5)
HGB BLD-MCNC: 11.9 G/DL (ref 12–16)
HGB UR QL STRIP: ABNORMAL
IMM GRANULOCYTES # BLD AUTO: 0.01 K/UL (ref 0–0.04)
IMM GRANULOCYTES NFR BLD AUTO: 0.2 % (ref 0–0.5)
KETONES UR QL STRIP: NEGATIVE
LEUKOCYTE ESTERASE UR QL STRIP: ABNORMAL
LYMPHOCYTES # BLD AUTO: 1.9 K/UL (ref 1–4.8)
LYMPHOCYTES NFR BLD: 32.3 % (ref 18–48)
MCH RBC QN AUTO: 31.8 PG (ref 27–31)
MCHC RBC AUTO-ENTMCNC: 34.1 G/DL (ref 32–36)
MCV RBC AUTO: 93 FL (ref 82–98)
MICROSCOPIC COMMENT: ABNORMAL
MONOCYTES # BLD AUTO: 0.5 K/UL (ref 0.3–1)
MONOCYTES NFR BLD: 8.5 % (ref 4–15)
NEUTROPHILS # BLD AUTO: 3.3 K/UL (ref 1.8–7.7)
NEUTROPHILS NFR BLD: 56.8 % (ref 38–73)
NITRITE UR QL STRIP: NEGATIVE
NRBC BLD-RTO: 0 /100 WBC
PH UR STRIP: 7 [PH] (ref 5–8)
PLATELET # BLD AUTO: 210 K/UL (ref 150–450)
PMV BLD AUTO: 9.5 FL (ref 9.2–12.9)
POTASSIUM SERPL-SCNC: 3.3 MMOL/L (ref 3.5–5.1)
PROT SERPL-MCNC: 7.5 G/DL (ref 6–8.4)
PROT UR QL STRIP: NEGATIVE
RBC # BLD AUTO: 3.74 M/UL (ref 4–5.4)
RBC #/AREA URNS HPF: 5 /HPF (ref 0–4)
SODIUM SERPL-SCNC: 139 MMOL/L (ref 136–145)
SP GR UR STRIP: <1.005 (ref 1–1.03)
SQUAMOUS #/AREA URNS HPF: 5 /HPF
TROPONIN I SERPL DL<=0.01 NG/ML-MCNC: <0.006 NG/ML (ref 0–0.03)
TROPONIN I SERPL DL<=0.01 NG/ML-MCNC: <0.006 NG/ML (ref 0–0.03)
URN SPEC COLLECT METH UR: ABNORMAL
UROBILINOGEN UR STRIP-ACNC: NEGATIVE EU/DL
WBC # BLD AUTO: 5.75 K/UL (ref 3.9–12.7)
WBC #/AREA URNS HPF: 3 /HPF (ref 0–5)

## 2024-12-15 PROCEDURE — 85025 COMPLETE CBC W/AUTO DIFF WBC: CPT | Performed by: EMERGENCY MEDICINE

## 2024-12-15 PROCEDURE — 25000003 PHARM REV CODE 250: Performed by: EMERGENCY MEDICINE

## 2024-12-15 PROCEDURE — 81000 URINALYSIS NONAUTO W/SCOPE: CPT | Performed by: EMERGENCY MEDICINE

## 2024-12-15 PROCEDURE — 84484 ASSAY OF TROPONIN QUANT: CPT | Mod: 91 | Performed by: EMERGENCY MEDICINE

## 2024-12-15 PROCEDURE — 83880 ASSAY OF NATRIURETIC PEPTIDE: CPT | Performed by: EMERGENCY MEDICINE

## 2024-12-15 PROCEDURE — 99285 EMERGENCY DEPT VISIT HI MDM: CPT | Mod: 25

## 2024-12-15 PROCEDURE — 80053 COMPREHEN METABOLIC PANEL: CPT | Performed by: EMERGENCY MEDICINE

## 2024-12-15 RX ADMIN — POTASSIUM BICARBONATE 20 MEQ: 391 TABLET, EFFERVESCENT ORAL at 05:12

## 2024-12-15 NOTE — ED PROVIDER NOTES
"SCRIBE #1 NOTE: I, Prateek Comer, am scribing for, and in the presence of, Randy Henao MD. I have scribed the HPI/ROS/PEx.    SCRIBE #2 NOTE: I, Carlitos Mcdaniels Jr., am scribing for, and in the presence of,  Elana Madrigal MD. I have scribed the remaining portions of the note not scribed by Scribe #1.      History     Chief Complaint   Patient presents with    Shortness of Breath     Pt reports she was woken up from her sleep gasping for air around 0230. Pt states just placed on clonidine for blood pressure. Pt states she can catch her breath now but f"eels like something is off and like air is trapped around my chest"     Review of patient's allergies indicates:  No Known Allergies      History of Present Illness     HPI    12/15/2024, 4:05 AM  History obtained from the patient and       History of Present Illness: Joycelyn Wesley is a 54 y.o. female patient with a PMHx of recurrent UTIs, panic attacks, anxiety, and microscopic hematuria who presents to the Emergency Department for evaluation of SOB which onset gradually around 2:30 am. Pt was sleeping at home when she awoke gasping for air. Pt describes the SOB as a heaviness on her chest. Pt checked her blood pressure and it was 180/111. Pt's normal blood pressure is around 127/76. Pt reports she was at this facility a few weeks ago for a burning CP and HTN. Pt also reports she was here a few months ago for LLQ pain and dx with GERD and prescribed 40 mg Pantoprazole. Symptoms are now relieved and were moderate in severity. No mitigating or exacerbating factors reported. Associated sxs include slight dysuria and anxiety. Patient denies any other sxs at this time.  states pt has hx of panic attacks that involved shaking but has stopped due to medication. No prior Tx reported. No further complaints or concerns at this time.       Arrival mode: Personal vehicle    PCP: Jenn Womack MD        Past Medical History:  Past Medical History: "   Diagnosis Date    Fibroids     Hematuria, microscopic 2021    Mild vitamin D deficiency 2015    Panic attacks     Surgical menopause        Past Surgical History:  Past Surgical History:   Procedure Laterality Date     SECTION, LOW TRANSVERSE      x 2    COLONOSCOPY N/A 2015    Procedure: COLONOSCOPY;  Surgeon: Salty Costello MD;  Location: [facility] ENDO;  Service: Endoscopy;  Laterality: N/A;    COLONOSCOPY N/A 2024    Procedure: COLONOSCOPY;  Surgeon: Betsey Russo MD;  Location: [facility] ENDO;  Service: Endoscopy;  Laterality: N/A;    ESOPHAGOGASTRODUODENOSCOPY N/A 2024    Procedure: EGD (ESOPHAGOGASTRODUODENOSCOPY);  Surgeon: Betsey Russo MD;  Location: [facility] ENDO;  Service: Endoscopy;  Laterality: N/A;    HYSTERECTOMY  04/15/2019    Fibroids    TOTAL ABDOMINAL HYSTERECTOMY W/ BILATERAL SALPINGOOPHORECTOMY  04/15/2019    Due to fibroids    TOTAL ABDOMINAL HYSTERECTOMY W/ BILATERAL SALPINGOOPHORECTOMY  2019    fibroids    TUBAL LIGATION           Family History:  Family History   Problem Relation Name Age of Onset    Cancer Mother          Breast cancer    Breast cancer Mother      Emphysema Father      Lung cancer Father      Panic disorder Brother      Cancer Maternal Aunt          Breast cancer    Breast cancer Maternal Aunt      Cancer Maternal Grandmother          Colon cancer    Colon cancer Maternal Grandmother      Cancer Maternal Aunt          Breast cancer    No Known Problems Daughter      No Known Problems Son      No Known Problems Son      No Known Problems Sister      Heart disease Neg Hx      Diabetes Neg Hx      Stroke Neg Hx      Hypertension Neg Hx         Social History:  Social History     Tobacco Use    Smoking status: Never    Smokeless tobacco: Never   Substance and Sexual Activity    Alcohol use: No     Alcohol/week: 0.0 standard drinks of alcohol    Drug use: No    Sexual activity: Yes     Partners: Male     Birth control/protection: Surgical         Review of Systems     Review of Systems   Constitutional:  Negative for chills and fever.   HENT:  Negative for sore throat.    Respiratory:  Positive for shortness of breath.    Cardiovascular:  Negative for chest pain.   Gastrointestinal:  Negative for abdominal pain, nausea and vomiting.   Genitourinary:  Positive for dysuria.   Musculoskeletal:  Negative for back pain.   Skin:  Negative for rash.   Neurological:  Negative for dizziness, weakness, light-headedness and headaches.   Hematological:  Does not bruise/bleed easily.   Psychiatric/Behavioral:  The patient is nervous/anxious.    All other systems reviewed and are negative.       Physical Exam     Initial Vitals [12/15/24 0257]   BP Pulse Resp Temp SpO2   (!) 191/112 87 18 98 °F (36.7 °C) 100 %      MAP       --          Physical Exam  Nursing Notes and Vital Signs Reviewed.  Constitutional: Patient is in no acute distress. Well-developed and well-nourished.  Head: Atraumatic. Normocephalic.  Eyes: PERRL. EOM intact. Conjunctivae are not pale. No scleral icterus.  ENT: Mucous membranes are moist.   Neck: Supple. Full ROM.  Cardiovascular: Regular rate. Regular rhythm. No murmurs, rubs, or gallops. Distal pulses are 2+ and symmetric.  Pulmonary/Chest: No respiratory distress. Clear to auscultation bilaterally. No wheezing or rales.  Abdominal: Soft and non-distended.  There is no tenderness.  No rebound, guarding, or rigidity.  Genitourinary: No CVA tenderness  Musculoskeletal: Moves all extremities. No obvious deformities. No edema.   Skin: Warm and dry.  Neurological:  Alert, awake, and appropriate.  Normal speech.  No acute focal neurological deficits are appreciated.  Psychiatric: Normal affect. Good eye contact. Appropriate in content.     ED Course   Procedures  ED Vital Signs:  Vitals:    12/15/24 0257 12/15/24 0300 12/15/24 0405 12/15/24 0500   BP: (!) 191/112 (!) 176/107 123/86 137/88   Pulse: 87 82 76 66   Resp: 18 18 18 14   Temp: 98 °F  "(36.7 °C)      TempSrc: Oral      SpO2: 100% 100% 100% 100%   Weight: 79.4 kg (175 lb 0.7 oz)      Height: 5' 6" (1.676 m)       12/15/24 0600 12/15/24 0630 12/15/24 0700 12/15/24 0703   BP: 122/81 130/86 119/82    Pulse: 64 71 64 64   Resp: 16 15 13    Temp:       TempSrc:       SpO2: 99% 100% 99%    Weight:       Height:           Abnormal Lab Results:  Labs Reviewed   CBC W/ AUTO DIFFERENTIAL - Abnormal       Result Value    WBC 5.75      RBC 3.74 (*)     Hemoglobin 11.9 (*)     Hematocrit 34.9 (*)     MCV 93      MCH 31.8 (*)     MCHC 34.1      RDW 11.8      Platelets 210      MPV 9.5      Immature Granulocytes 0.2      Gran # (ANC) 3.3      Immature Grans (Abs) 0.01      Lymph # 1.9      Mono # 0.5      Eos # 0.1      Baso # 0.02      nRBC 0      Gran % 56.8      Lymph % 32.3      Mono % 8.5      Eosinophil % 1.9      Basophil % 0.3      Differential Method Automated     COMPREHENSIVE METABOLIC PANEL - Abnormal    Sodium 139      Potassium 3.3 (*)     Chloride 104      CO2 26      Glucose 102      BUN 11      Creatinine 0.8      Calcium 9.0      Total Protein 7.5      Albumin 3.6      Total Bilirubin 0.2      Alkaline Phosphatase 55      AST 14      ALT 10      eGFR >60      Anion Gap 9     URINALYSIS, REFLEX TO URINE CULTURE - Abnormal    Specimen UA Urine, Clean Catch      Color, UA Colorless (*)     Appearance, UA Clear      pH, UA 7.0      Specific Gravity, UA <1.005 (*)     Protein, UA Negative      Glucose, UA Negative      Ketones, UA Negative      Bilirubin (UA) Negative      Occult Blood UA 1+ (*)     Nitrite, UA Negative      Urobilinogen, UA Negative      Leukocytes, UA 1+ (*)     Narrative:     Specimen Source->Urine   URINALYSIS MICROSCOPIC - Abnormal    RBC, UA 5 (*)     WBC, UA 3      Squam Epithel, UA 5      Microscopic Comment SEE COMMENT      Narrative:     Specimen Source->Urine   B-TYPE NATRIURETIC PEPTIDE    BNP 26     TROPONIN I    Troponin I <0.006     TROPONIN I   TROPONIN I    Troponin " I <0.006          All Lab Results:  Results for orders placed or performed during the hospital encounter of 12/15/24   CBC auto differential    Collection Time: 12/15/24  3:07 AM   Result Value Ref Range    WBC 5.75 3.90 - 12.70 K/uL    RBC 3.74 (L) 4.00 - 5.40 M/uL    Hemoglobin 11.9 (L) 12.0 - 16.0 g/dL    Hematocrit 34.9 (L) 37.0 - 48.5 %    MCV 93 82 - 98 fL    MCH 31.8 (H) 27.0 - 31.0 pg    MCHC 34.1 32.0 - 36.0 g/dL    RDW 11.8 11.5 - 14.5 %    Platelets 210 150 - 450 K/uL    MPV 9.5 9.2 - 12.9 fL    Immature Granulocytes 0.2 0.0 - 0.5 %    Gran # (ANC) 3.3 1.8 - 7.7 K/uL    Immature Grans (Abs) 0.01 0.00 - 0.04 K/uL    Lymph # 1.9 1.0 - 4.8 K/uL    Mono # 0.5 0.3 - 1.0 K/uL    Eos # 0.1 0.0 - 0.5 K/uL    Baso # 0.02 0.00 - 0.20 K/uL    nRBC 0 0 /100 WBC    Gran % 56.8 38.0 - 73.0 %    Lymph % 32.3 18.0 - 48.0 %    Mono % 8.5 4.0 - 15.0 %    Eosinophil % 1.9 0.0 - 8.0 %    Basophil % 0.3 0.0 - 1.9 %    Differential Method Automated    Comprehensive metabolic panel    Collection Time: 12/15/24  3:07 AM   Result Value Ref Range    Sodium 139 136 - 145 mmol/L    Potassium 3.3 (L) 3.5 - 5.1 mmol/L    Chloride 104 95 - 110 mmol/L    CO2 26 23 - 29 mmol/L    Glucose 102 70 - 110 mg/dL    BUN 11 6 - 20 mg/dL    Creatinine 0.8 0.5 - 1.4 mg/dL    Calcium 9.0 8.7 - 10.5 mg/dL    Total Protein 7.5 6.0 - 8.4 g/dL    Albumin 3.6 3.5 - 5.2 g/dL    Total Bilirubin 0.2 0.1 - 1.0 mg/dL    Alkaline Phosphatase 55 40 - 150 U/L    AST 14 10 - 40 U/L    ALT 10 10 - 44 U/L    eGFR >60 >60 mL/min/1.73 m^2    Anion Gap 9 8 - 16 mmol/L   Brain natriuretic peptide    Collection Time: 12/15/24  3:07 AM   Result Value Ref Range    BNP 26 0 - 99 pg/mL   Troponin I    Collection Time: 12/15/24  3:07 AM   Result Value Ref Range    Troponin I <0.006 0.000 - 0.026 ng/mL   Urinalysis, Reflex to Urine Culture Urine, Clean Catch    Collection Time: 12/15/24  3:58 AM    Specimen: Urine, Clean Catch   Result Value Ref Range    Specimen UA Urine,  Clean Catch     Color, UA Colorless (A) Yellow, Straw, Sofya    Appearance, UA Clear Clear    pH, UA 7.0 5.0 - 8.0    Specific Gravity, UA <1.005 (A) 1.005 - 1.030    Protein, UA Negative Negative    Glucose, UA Negative Negative    Ketones, UA Negative Negative    Bilirubin (UA) Negative Negative    Occult Blood UA 1+ (A) Negative    Nitrite, UA Negative Negative    Urobilinogen, UA Negative <2.0 EU/dL    Leukocytes, UA 1+ (A) Negative   Urinalysis Microscopic    Collection Time: 12/15/24  3:58 AM   Result Value Ref Range    RBC, UA 5 (H) 0 - 4 /hpf    WBC, UA 3 0 - 5 /hpf    Squam Epithel, UA 5 /hpf    Microscopic Comment SEE COMMENT    Troponin I    Collection Time: 12/15/24  5:43 AM   Result Value Ref Range    Troponin I <0.006 0.000 - 0.026 ng/mL        Imaging Results:  Imaging Results              X-Ray Chest AP Portable (Final result)  Result time 12/15/24 06:39:39      Final result by Wil Aguilar MD (12/15/24 06:39:39)                   Impression:      No acute cardiopulmonary disease.      Electronically signed by: Wil Aguilar MD  Date:    12/15/2024  Time:    06:39               Narrative:    EXAMINATION:  XR CHEST AP PORTABLE    CLINICAL HISTORY:  dyspnea;    COMPARISON:  Chest x-ray, 11/19/2024    FINDINGS:  The lungs are clear. The cardiac silhouette is within normal limits. No pleural effusion or pneumothorax.  Intact thoracic osseous structures.                                                The Emergency Provider reviewed the vital signs and test results, which are outlined above.     ED Discussion       6:00 AM: Dr. Henao transfers care of patient to Dr. Madrigal pending lab results.       7:04 AM: Reassessed pt at this time.  Discussed with pt all pertinent ED information and results. Discussed pt dx and plan of tx. Gave pt all f/u and return to the ED instructions. All questions and concerns were addressed at this time. Pt expresses understanding of information and instructions, and is  comfortable with plan to discharge. Pt is stable for discharge.    I discussed with patient and/or family/caretaker that evaluation in the ED does not suggest any emergent or life threatening medical conditions requiring immediate intervention beyond what was provided in the ED, and I believe patient is safe for discharge.  Regardless, an unremarkable evaluation in the ED does not preclude the development or presence of a serious of life threatening condition. As such, patient was instructed to return immediately for any worsening or change in current symptoms.         Medical Decision Making  DDX: 1. Anxiety attack 2. ACS 3. Panic disorder    ECG reviewed and no acute ischemic changes, CXR normal, lab work reviewed and otherwise normal including cardiac markers, overall feel patient's symptoms are related to anxiety    Amount and/or Complexity of Data Reviewed  Labs: ordered. Decision-making details documented in ED Course.  Radiology: ordered. Decision-making details documented in ED Course.  ECG/medicine tests: ordered and independent interpretation performed. Decision-making details documented in ED Course.     Details: NSR, No acute ischemic changes, normal qtc    Risk  Prescription drug management.                ED Medication(s):  Medications   potassium bicarbonate disintegrating tablet 20 mEq (20 mEq Oral Given 12/15/24 0515)       Discharge Medication List as of 12/15/2024  7:03 AM           Follow-up Information       Jenn Womack MD. Schedule an appointment as soon as possible for a visit in 2 days.    Specialty: Family Medicine  Why: Return to the  Emergency Room, If symptoms worsen  Contact information:  [address] [place] [address]  [phone]                                 Scribe Attestation:   Scribe #1: I performed the above scribed service and the documentation accurately describes the services I performed. I attest to the accuracy of the note.     Attending:   Physician  Attestation Statement for Scribe #1: I, Randy Henao MD, personally performed the services described in this documentation, as scribed by Prateek Comer, in my presence, and it is both accurate and complete.       Scribe Attestation:   Scribe #2: I performed the above scribed service and the documentation accurately describes the services I performed. I attest to the accuracy of the note.    Attending Attestation:           Physician Attestation for Scribe:    Physician Attestation Statement for Scribe #2: I, Elana Madrigal MD, reviewed documentation, as scribed by Carliots Mcdaniels Jr. in my presence, and it is both accurate and complete. I also acknowledge and confirm the content of the note done by Scribe #1.           Clinical Impression       ICD-10-CM ICD-9-CM   1. Anxiety  F41.9 300.00   2. Shortness of breath  R06.02 786.05   3. Dyspnea  R06.00 786.09       Disposition:   Disposition: Discharged  Condition: Stable        Elana Madrigal MD  12/19/24 [id number]

## 2024-12-16 ENCOUNTER — TELEPHONE (OUTPATIENT)
Dept: GASTROENTEROLOGY | Facility: CLINIC | Age: 54
End: 2024-12-16
Payer: COMMERCIAL

## 2024-12-16 NOTE — TELEPHONE ENCOUNTER
----- Message from AixaSympler sent at 12/16/2024  7:56 AM CST -----  .Type:  Sooner Apoointment Request    Caller is requesting a sooner appointment.  Caller declined first available appointment listed below.  Caller will not accept being placed on the waitlist and is requesting a message be sent to doctor.  Name of Caller:pt  David  When is the first available appointment?1/9/25  Symptoms:burning feeling in pt chest  Would the patient rather a call back or a response via [facility]? Call back  Best Call Back Number:  Additional Information:

## 2024-12-16 NOTE — TELEPHONE ENCOUNTER
Contacted pt and spoke with pt Myra. Pt wanted to be seen this week. Pt was advised that we did not have any appointments available until the new year. Appt declined and stated they have another appt scheduled at an outside Gi office.

## 2025-01-07 DIAGNOSIS — F41.1 GAD (GENERALIZED ANXIETY DISORDER): ICD-10-CM

## 2025-01-08 RX ORDER — ALPRAZOLAM 0.25 MG/1
.25-.5 TABLET ORAL 3 TIMES DAILY PRN
Qty: 30 TABLET | Refills: 0 | Status: SHIPPED | OUTPATIENT
Start: 2025-01-08 | End: 2025-02-07

## 2025-01-08 NOTE — TELEPHONE ENCOUNTER
No care due was identified.  [facility] Embedded Care Due Messages. Reference number: [id number].   1/07/2025 10:51:56 PM CST

## 2025-02-21 PROBLEM — E78.2 MODERATE MIXED HYPERLIPIDEMIA NOT REQUIRING STATIN THERAPY: Status: ACTIVE | Noted: 2024-11-13

## 2025-03-03 ENCOUNTER — PATIENT OUTREACH (OUTPATIENT)
Dept: ADMINISTRATIVE | Facility: HOSPITAL | Age: 55
End: 2025-03-03

## 2025-06-02 RX ORDER — PANTOPRAZOLE SODIUM 40 MG/1
40 TABLET, DELAYED RELEASE ORAL
Qty: 90 TABLET | Refills: 0 | Status: SHIPPED | OUTPATIENT
Start: 2025-06-02

## 2025-07-01 NOTE — TELEPHONE ENCOUNTER
No care due was identified.  [facility] Embedded Care Due Messages. Reference number: [id number].   7/01/2025 2:17:15 PM CDT

## 2025-07-02 RX ORDER — PANTOPRAZOLE SODIUM 40 MG/1
40 TABLET, DELAYED RELEASE ORAL
Qty: 90 TABLET | Refills: 0 | Status: SHIPPED | OUTPATIENT
Start: 2025-07-02